# Patient Record
Sex: MALE | Race: WHITE | NOT HISPANIC OR LATINO | Employment: OTHER | URBAN - METROPOLITAN AREA
[De-identification: names, ages, dates, MRNs, and addresses within clinical notes are randomized per-mention and may not be internally consistent; named-entity substitution may affect disease eponyms.]

---

## 2018-03-29 ENCOUNTER — TRANSCRIBE ORDERS (OUTPATIENT)
Dept: ADMINISTRATIVE | Facility: HOSPITAL | Age: 68
End: 2018-03-29

## 2018-03-29 DIAGNOSIS — I20.0 UNSTABLE ANGINA (HCC): Primary | ICD-10-CM

## 2018-03-30 ENCOUNTER — APPOINTMENT (OUTPATIENT)
Dept: CARDIOLOGY | Facility: HOSPITAL | Age: 68
End: 2018-03-30

## 2018-03-30 ENCOUNTER — HOSPITAL ENCOUNTER (OUTPATIENT)
Facility: HOSPITAL | Age: 68
Setting detail: HOSPITAL OUTPATIENT SURGERY
Discharge: HOME | End: 2018-03-30
Attending: INTERNAL MEDICINE | Admitting: INTERNAL MEDICINE

## 2018-03-30 VITALS
TEMPERATURE: 97.8 F | WEIGHT: 218 LBS | RESPIRATION RATE: 16 BRPM | BODY MASS INDEX: 29.53 KG/M2 | HEIGHT: 72 IN | SYSTOLIC BLOOD PRESSURE: 136 MMHG | HEART RATE: 44 BPM | DIASTOLIC BLOOD PRESSURE: 81 MMHG | OXYGEN SATURATION: 99 %

## 2018-03-30 DIAGNOSIS — I20.0 UNSTABLE ANGINA (HCC): ICD-10-CM

## 2018-03-30 LAB
ANION GAP SERPL CALCULATED.3IONS-SCNC: 10 MMOL/L (ref 3–11)
BUN BLD-MCNC: 19 MG/DL (ref 9–23)
BUN/CREAT SERPL: 15.8 (ref 7–25)
CALCIUM SPEC-SCNC: 9.4 MG/DL (ref 8.7–10.4)
CHLORIDE SERPL-SCNC: 98 MMOL/L (ref 99–109)
CO2 SERPL-SCNC: 25 MMOL/L (ref 20–31)
CREAT BLD-MCNC: 1.2 MG/DL (ref 0.6–1.3)
DEPRECATED RDW RBC AUTO: 42.8 FL (ref 37–54)
ERYTHROCYTE [DISTWIDTH] IN BLOOD BY AUTOMATED COUNT: 12.7 % (ref 11.3–14.5)
GFR SERPL CREATININE-BSD FRML MDRD: 60 ML/MIN/1.73
GLUCOSE BLD-MCNC: 107 MG/DL (ref 70–100)
HCT VFR BLD AUTO: 39.9 % (ref 38.9–50.9)
HGB BLD-MCNC: 13.7 G/DL (ref 13.1–17.5)
MCH RBC QN AUTO: 31.4 PG (ref 27–31)
MCHC RBC AUTO-ENTMCNC: 34.3 G/DL (ref 32–36)
MCV RBC AUTO: 91.3 FL (ref 80–99)
PLATELET # BLD AUTO: 184 10*3/MM3 (ref 150–450)
PMV BLD AUTO: 9.8 FL (ref 6–12)
POTASSIUM BLD-SCNC: 4.1 MMOL/L (ref 3.5–5.5)
RBC # BLD AUTO: 4.37 10*6/MM3 (ref 4.2–5.76)
SODIUM BLD-SCNC: 133 MMOL/L (ref 132–146)
WBC NRBC COR # BLD: 6.54 10*3/MM3 (ref 3.5–10.8)

## 2018-03-30 PROCEDURE — 80047 BASIC METABLC PNL IONIZED CA: CPT

## 2018-03-30 PROCEDURE — 0 IOPAMIDOL PER 1 ML: Performed by: INTERNAL MEDICINE

## 2018-03-30 PROCEDURE — 25010000002 FENTANYL CITRATE (PF) 100 MCG/2ML SOLUTION: Performed by: INTERNAL MEDICINE

## 2018-03-30 PROCEDURE — 85014 HEMATOCRIT: CPT

## 2018-03-30 PROCEDURE — 25010000002 HEPARIN (PORCINE) PER 1000 UNITS: Performed by: INTERNAL MEDICINE

## 2018-03-30 PROCEDURE — 85027 COMPLETE CBC AUTOMATED: CPT | Performed by: INTERNAL MEDICINE

## 2018-03-30 PROCEDURE — C1769 GUIDE WIRE: HCPCS | Performed by: INTERNAL MEDICINE

## 2018-03-30 PROCEDURE — 93458 L HRT ARTERY/VENTRICLE ANGIO: CPT | Performed by: INTERNAL MEDICINE

## 2018-03-30 PROCEDURE — 93306 TTE W/DOPPLER COMPLETE: CPT

## 2018-03-30 PROCEDURE — 25010000002 MIDAZOLAM PER 1 MG: Performed by: INTERNAL MEDICINE

## 2018-03-30 PROCEDURE — C1894 INTRO/SHEATH, NON-LASER: HCPCS | Performed by: INTERNAL MEDICINE

## 2018-03-30 PROCEDURE — 80048 BASIC METABOLIC PNL TOTAL CA: CPT | Performed by: INTERNAL MEDICINE

## 2018-03-30 PROCEDURE — 93880 EXTRACRANIAL BILAT STUDY: CPT

## 2018-03-30 PROCEDURE — 36415 COLL VENOUS BLD VENIPUNCTURE: CPT

## 2018-03-30 RX ORDER — ALPRAZOLAM 0.25 MG/1
0.25 TABLET ORAL 3 TIMES DAILY PRN
Status: DISCONTINUED | OUTPATIENT
Start: 2018-03-30 | End: 2018-03-30 | Stop reason: HOSPADM

## 2018-03-30 RX ORDER — LIDOCAINE HYDROCHLORIDE 10 MG/ML
INJECTION, SOLUTION EPIDURAL; INFILTRATION; INTRACAUDAL; PERINEURAL AS NEEDED
Status: DISCONTINUED | OUTPATIENT
Start: 2018-03-30 | End: 2018-03-30 | Stop reason: HOSPADM

## 2018-03-30 RX ORDER — NALOXONE HCL 0.4 MG/ML
0.4 VIAL (ML) INJECTION
Status: DISCONTINUED | OUTPATIENT
Start: 2018-03-30 | End: 2018-03-30 | Stop reason: HOSPADM

## 2018-03-30 RX ORDER — SODIUM CHLORIDE 1000 MG
1 TABLET, SOLUBLE MISCELLANEOUS 2 TIMES DAILY
COMMUNITY
End: 2018-11-30

## 2018-03-30 RX ORDER — LORAZEPAM 0.5 MG
1 TABLET ORAL 3 TIMES DAILY
COMMUNITY
End: 2018-11-30

## 2018-03-30 RX ORDER — ASPIRIN 81 MG/1
81 TABLET, CHEWABLE ORAL DAILY
COMMUNITY

## 2018-03-30 RX ORDER — NEBIVOLOL 2.5 MG/1
2.5 TABLET ORAL DAILY
COMMUNITY
End: 2018-04-12 | Stop reason: HOSPADM

## 2018-03-30 RX ORDER — FEXOFENADINE HCL 180 MG/1
180 TABLET ORAL 2 TIMES DAILY
COMMUNITY
End: 2018-11-30

## 2018-03-30 RX ORDER — VALSARTAN 160 MG/1
320 TABLET ORAL NIGHTLY
COMMUNITY
End: 2018-04-12 | Stop reason: HOSPADM

## 2018-03-30 RX ORDER — ACETAMINOPHEN 325 MG/1
650 TABLET ORAL EVERY 4 HOURS PRN
Status: DISCONTINUED | OUTPATIENT
Start: 2018-03-30 | End: 2018-03-30 | Stop reason: HOSPADM

## 2018-03-30 RX ORDER — RANITIDINE 150 MG/1
300 TABLET ORAL 2 TIMES DAILY
COMMUNITY
End: 2022-07-26

## 2018-03-30 RX ORDER — SODIUM CHLORIDE 9 MG/ML
250 INJECTION, SOLUTION INTRAVENOUS CONTINUOUS
Status: ACTIVE | OUTPATIENT
Start: 2018-03-30 | End: 2018-03-30

## 2018-03-30 RX ORDER — MIDAZOLAM HYDROCHLORIDE 1 MG/ML
INJECTION INTRAMUSCULAR; INTRAVENOUS AS NEEDED
Status: DISCONTINUED | OUTPATIENT
Start: 2018-03-30 | End: 2018-03-30 | Stop reason: HOSPADM

## 2018-03-30 RX ORDER — LEVOTHYROXINE SODIUM 0.05 MG/1
50 TABLET ORAL DAILY
COMMUNITY
End: 2022-06-24

## 2018-03-30 RX ORDER — TEMAZEPAM 7.5 MG/1
7.5 CAPSULE ORAL NIGHTLY PRN
Status: DISCONTINUED | OUTPATIENT
Start: 2018-03-30 | End: 2018-03-30 | Stop reason: HOSPADM

## 2018-03-30 RX ORDER — ASPIRIN 325 MG
325 TABLET, DELAYED RELEASE (ENTERIC COATED) ORAL DAILY
Status: DISCONTINUED | OUTPATIENT
Start: 2018-03-30 | End: 2018-03-30 | Stop reason: HOSPADM

## 2018-03-30 RX ORDER — MORPHINE SULFATE 2 MG/ML
1 INJECTION, SOLUTION INTRAMUSCULAR; INTRAVENOUS EVERY 4 HOURS PRN
Status: DISCONTINUED | OUTPATIENT
Start: 2018-03-30 | End: 2018-03-30 | Stop reason: HOSPADM

## 2018-03-30 RX ORDER — MONTELUKAST SODIUM 10 MG/1
10 TABLET ORAL NIGHTLY
COMMUNITY
End: 2022-08-23

## 2018-03-30 RX ORDER — FENTANYL CITRATE 50 UG/ML
INJECTION, SOLUTION INTRAMUSCULAR; INTRAVENOUS AS NEEDED
Status: DISCONTINUED | OUTPATIENT
Start: 2018-03-30 | End: 2018-03-30 | Stop reason: HOSPADM

## 2018-03-30 RX ORDER — FLUTICASONE PROPIONATE 50 MCG
1 SPRAY, SUSPENSION (ML) NASAL DAILY
COMMUNITY
End: 2022-09-19

## 2018-03-30 RX ORDER — CLOPIDOGREL BISULFATE 75 MG/1
75 TABLET ORAL DAILY
COMMUNITY
End: 2018-03-30 | Stop reason: HOSPADM

## 2018-03-30 RX ORDER — HYDROCODONE BITARTRATE AND ACETAMINOPHEN 5; 325 MG/1; MG/1
1 TABLET ORAL EVERY 4 HOURS PRN
Status: DISCONTINUED | OUTPATIENT
Start: 2018-03-30 | End: 2018-03-30 | Stop reason: HOSPADM

## 2018-03-30 RX ORDER — LUBIPROSTONE 24 UG/1
24 CAPSULE ORAL 2 TIMES DAILY WITH MEALS
COMMUNITY
End: 2022-05-18

## 2018-03-30 RX ADMIN — ASPIRIN 325 MG: 325 TABLET, DELAYED RELEASE ORAL at 07:28

## 2018-04-02 ENCOUNTER — HOSPITAL ENCOUNTER (OUTPATIENT)
Dept: PULMONOLOGY | Facility: HOSPITAL | Age: 68
Discharge: HOME | End: 2018-04-02

## 2018-04-02 ENCOUNTER — HOSPITAL ENCOUNTER (OUTPATIENT)
Dept: GENERAL RADIOLOGY | Facility: HOSPITAL | Age: 68
Discharge: HOME | End: 2018-04-02
Admitting: PHYSICIAN ASSISTANT

## 2018-04-02 ENCOUNTER — OFFICE VISIT (OUTPATIENT)
Dept: CARDIAC SURGERY | Facility: CLINIC | Age: 68
End: 2018-04-02

## 2018-04-02 ENCOUNTER — APPOINTMENT (OUTPATIENT)
Dept: PREADMISSION TESTING | Facility: HOSPITAL | Age: 68
End: 2018-04-02

## 2018-04-02 ENCOUNTER — PREP FOR SURGERY (OUTPATIENT)
Dept: OTHER | Facility: HOSPITAL | Age: 68
End: 2018-04-02

## 2018-04-02 VITALS
HEART RATE: 51 BPM | TEMPERATURE: 97.6 F | HEIGHT: 72 IN | DIASTOLIC BLOOD PRESSURE: 70 MMHG | OXYGEN SATURATION: 98 % | BODY MASS INDEX: 30.07 KG/M2 | WEIGHT: 222 LBS | SYSTOLIC BLOOD PRESSURE: 160 MMHG

## 2018-04-02 VITALS — BODY MASS INDEX: 29.98 KG/M2 | WEIGHT: 221.34 LBS | HEIGHT: 72 IN

## 2018-04-02 DIAGNOSIS — I20.8 ANGINA AT REST (HCC): Primary | ICD-10-CM

## 2018-04-02 DIAGNOSIS — I73.9 PERIPHERAL ARTERIAL DISEASE (HCC): ICD-10-CM

## 2018-04-02 DIAGNOSIS — I25.118 CORONARY ARTERY DISEASE OF NATIVE ARTERY OF NATIVE HEART WITH STABLE ANGINA PECTORIS (HCC): ICD-10-CM

## 2018-04-02 DIAGNOSIS — I25.118 CORONARY ARTERY DISEASE OF NATIVE ARTERY OF NATIVE HEART WITH STABLE ANGINA PECTORIS (HCC): Primary | ICD-10-CM

## 2018-04-02 DIAGNOSIS — I20.0 UNSTABLE ANGINA (HCC): ICD-10-CM

## 2018-04-02 DIAGNOSIS — I25.119 CORONARY ARTERY DISEASE INVOLVING NATIVE CORONARY ARTERY OF NATIVE HEART WITH ANGINA PECTORIS (HCC): Primary | ICD-10-CM

## 2018-04-02 DIAGNOSIS — I73.9 PAD (PERIPHERAL ARTERY DISEASE) (HCC): Primary | ICD-10-CM

## 2018-04-02 PROBLEM — I20.89 ANGINA AT REST: Status: ACTIVE | Noted: 2018-04-02

## 2018-04-02 LAB
ABO GROUP BLD: NORMAL
ALBUMIN SERPL-MCNC: 4.4 G/DL (ref 3.2–4.8)
ALBUMIN/GLOB SERPL: 1.5 G/DL (ref 1.5–2.5)
ALP SERPL-CCNC: 75 U/L (ref 25–100)
ALT SERPL W P-5'-P-CCNC: 34 U/L (ref 7–40)
AMPHET+METHAMPHET UR QL: NEGATIVE
AMPHETAMINES UR QL: NEGATIVE
ANION GAP SERPL CALCULATED.3IONS-SCNC: 11 MMOL/L (ref 3–11)
APTT PPP: 33.2 SECONDS (ref 24–31)
AST SERPL-CCNC: 26 U/L (ref 0–33)
BARBITURATES UR QL SCN: NEGATIVE
BASOPHILS # BLD AUTO: 0.06 10*3/MM3 (ref 0–0.2)
BASOPHILS NFR BLD AUTO: 0.9 % (ref 0–1)
BENZODIAZ UR QL SCN: NEGATIVE
BH CV ECHO MEAS - AO MAX PG (FULL): 1.5 MMHG
BH CV ECHO MEAS - AO MAX PG: 7 MMHG
BH CV ECHO MEAS - AO ROOT AREA (BSA CORRECTED): 1.8
BH CV ECHO MEAS - AO ROOT AREA: 12 CM^2
BH CV ECHO MEAS - AO ROOT DIAM: 3.9 CM
BH CV ECHO MEAS - AO V2 MAX: 126.9 CM/SEC
BH CV ECHO MEAS - AVA(V,A): 2.4 CM^2
BH CV ECHO MEAS - AVA(V,D): 2.4 CM^2
BH CV ECHO MEAS - BSA(HAYCOCK): 2.3 M^2
BH CV ECHO MEAS - BSA(HAYCOCK): 2.3 M^2
BH CV ECHO MEAS - BSA: 2.2 M^2
BH CV ECHO MEAS - BSA: 2.2 M^2
BH CV ECHO MEAS - BZI_BMI: 29.6 KILOGRAMS/M^2
BH CV ECHO MEAS - BZI_BMI: 29.6 KILOGRAMS/M^2
BH CV ECHO MEAS - BZI_METRIC_HEIGHT: 182.9 CM
BH CV ECHO MEAS - BZI_METRIC_HEIGHT: 182.9 CM
BH CV ECHO MEAS - BZI_METRIC_WEIGHT: 98.9 KG
BH CV ECHO MEAS - BZI_METRIC_WEIGHT: 98.9 KG
BH CV ECHO MEAS - CONTRAST EF (2CH): 71.4 ML/M^2
BH CV ECHO MEAS - CONTRAST EF 4CH: 72.1 ML/M^2
BH CV ECHO MEAS - EDV(CUBED): 96.8 ML
BH CV ECHO MEAS - EDV(MOD-SP2): 84 ML
BH CV ECHO MEAS - EDV(MOD-SP4): 104 ML
BH CV ECHO MEAS - EDV(TEICH): 96.9 ML
BH CV ECHO MEAS - EF(CUBED): 76.9 %
BH CV ECHO MEAS - EF(MOD-SP2): 71.4 %
BH CV ECHO MEAS - EF(MOD-SP4): 72.1 %
BH CV ECHO MEAS - EF(TEICH): 69 %
BH CV ECHO MEAS - ESV(CUBED): 22.4 ML
BH CV ECHO MEAS - ESV(MOD-SP2): 24 ML
BH CV ECHO MEAS - ESV(MOD-SP4): 29 ML
BH CV ECHO MEAS - ESV(TEICH): 30 ML
BH CV ECHO MEAS - FS: 38.6 %
BH CV ECHO MEAS - IVS/LVPW: 0.99
BH CV ECHO MEAS - IVSD: 1 CM
BH CV ECHO MEAS - LA DIMENSION: 2.9 CM
BH CV ECHO MEAS - LA/AO: 0.73
BH CV ECHO MEAS - LAT PEAK E' VEL: 6.4 CM/SEC
BH CV ECHO MEAS - LV DIASTOLIC VOL/BSA (35-75): 47.1 ML/M^2
BH CV ECHO MEAS - LV MASS(C)D: 164.4 GRAMS
BH CV ECHO MEAS - LV MASS(C)DI: 74.4 GRAMS/M^2
BH CV ECHO MEAS - LV MAX PG: 5.5 MMHG
BH CV ECHO MEAS - LV SYSTOLIC VOL/BSA (12-30): 13.1 ML/M^2
BH CV ECHO MEAS - LV V1 MAX: 117.3 CM/SEC
BH CV ECHO MEAS - LVIDD: 4.6 CM
BH CV ECHO MEAS - LVIDS: 2.8 CM
BH CV ECHO MEAS - LVLD AP2: 8.5 CM
BH CV ECHO MEAS - LVLD AP4: 9.3 CM
BH CV ECHO MEAS - LVLS AP2: 7.3 CM
BH CV ECHO MEAS - LVLS AP4: 7.6 CM
BH CV ECHO MEAS - LVOT AREA (M): 2.5 CM^2
BH CV ECHO MEAS - LVOT AREA: 2.6 CM^2
BH CV ECHO MEAS - LVOT DIAM: 1.8 CM
BH CV ECHO MEAS - LVPWD: 1 CM
BH CV ECHO MEAS - MED PEAK E' VEL: 7.74 CM/SEC
BH CV ECHO MEAS - MV A MAX VEL: 71.3 CM/SEC
BH CV ECHO MEAS - MV DEC TIME: 0.27 SEC
BH CV ECHO MEAS - MV E MAX VEL: 91.5 CM/SEC
BH CV ECHO MEAS - MV E/A: 1.3
BH CV ECHO MEAS - PA ACC SLOPE: 718.3 CM/SEC^2
BH CV ECHO MEAS - PA ACC TIME: 0.08 SEC
BH CV ECHO MEAS - PA PR(ACCEL): 43.3 MMHG
BH CV ECHO MEAS - RVDD: 3.7 CM
BH CV ECHO MEAS - SI(CUBED): 33.7 ML/M^2
BH CV ECHO MEAS - SI(MOD-SP2): 27.2 ML/M^2
BH CV ECHO MEAS - SI(MOD-SP4): 33.9 ML/M^2
BH CV ECHO MEAS - SI(TEICH): 30.3 ML/M^2
BH CV ECHO MEAS - SV(CUBED): 74.4 ML
BH CV ECHO MEAS - SV(MOD-SP2): 60 ML
BH CV ECHO MEAS - SV(MOD-SP4): 75 ML
BH CV ECHO MEAS - SV(TEICH): 66.9 ML
BH CV ECHO MEAS - TAPSE (>1.6): 2 CM2
BH CV ECHO MEAS - TR MAX VEL: 189.8 CM/SEC
BH CV VAS BP RIGHT ARM: NORMAL MMHG
BH CV XLRA - RV BASE: 4 CM
BH CV XLRA - RV LENGTH: 8.1 CM
BH CV XLRA - RV MID: 4.2 CM
BH CV XLRA - TDI S': 12.2 CM/SEC
BH CV XLRA MEAS LEFT CCA RATIO VEL: 83.1 CM/SEC
BH CV XLRA MEAS LEFT DIST CCA EDV: 21 CM/SEC
BH CV XLRA MEAS LEFT DIST CCA PSV: 68.4 CM/SEC
BH CV XLRA MEAS LEFT DIST ICA EDV: 23.2 CM/SEC
BH CV XLRA MEAS LEFT DIST ICA PSV: 60.7 CM/SEC
BH CV XLRA MEAS LEFT ICA RATIO VEL: 78.9 CM/SEC
BH CV XLRA MEAS LEFT ICA/CCA RATIO: 0.95
BH CV XLRA MEAS LEFT MID CCA EDV: 20.3 CM/SEC
BH CV XLRA MEAS LEFT MID CCA PSV: 83.8 CM/SEC
BH CV XLRA MEAS LEFT MID ICA EDV: 21.6 CM/SEC
BH CV XLRA MEAS LEFT MID ICA PSV: 79.6 CM/SEC
BH CV XLRA MEAS LEFT PROX CCA EDV: 18.9 CM/SEC
BH CV XLRA MEAS LEFT PROX CCA PSV: 82.4 CM/SEC
BH CV XLRA MEAS LEFT PROX ECA PSV: 111.7 CM/SEC
BH CV XLRA MEAS LEFT PROX ICA EDV: 27.2 CM/SEC
BH CV XLRA MEAS LEFT PROX ICA PSV: 88.7 CM/SEC
BH CV XLRA MEAS LEFT PROX SCLA PSV: 163 CM/SEC
BH CV XLRA MEAS LEFT VERTEBRAL A EDV: 9.8 CM/SEC
BH CV XLRA MEAS LEFT VERTEBRAL A PSV: 40.1 CM/SEC
BH CV XLRA MEAS RIGHT CCA RATIO VEL: 70.7 CM/SEC
BH CV XLRA MEAS RIGHT DIST CCA EDV: 16.7 CM/SEC
BH CV XLRA MEAS RIGHT DIST CCA PSV: 72.2 CM/SEC
BH CV XLRA MEAS RIGHT DIST ICA EDV: 27 CM/SEC
BH CV XLRA MEAS RIGHT DIST ICA PSV: 76.1 CM/SEC
BH CV XLRA MEAS RIGHT ICA RATIO VEL: 79.6 CM/SEC
BH CV XLRA MEAS RIGHT ICA/CCA RATIO: 1.1
BH CV XLRA MEAS RIGHT MID CCA EDV: 17.7 CM/SEC
BH CV XLRA MEAS RIGHT MID CCA PSV: 71.2 CM/SEC
BH CV XLRA MEAS RIGHT MID ICA EDV: 29 CM/SEC
BH CV XLRA MEAS RIGHT MID ICA PSV: 80 CM/SEC
BH CV XLRA MEAS RIGHT PROX CCA EDV: 18.9 CM/SEC
BH CV XLRA MEAS RIGHT PROX CCA PSV: 86.7 CM/SEC
BH CV XLRA MEAS RIGHT PROX ECA PSV: 96.3 CM/SEC
BH CV XLRA MEAS RIGHT PROX ICA EDV: 25.5 CM/SEC
BH CV XLRA MEAS RIGHT PROX ICA PSV: 64.8 CM/SEC
BH CV XLRA MEAS RIGHT PROX SCLA PSV: 121.9 CM/SEC
BH CV XLRA MEAS RIGHT VERTEBRAL A EDV: 16.7 CM/SEC
BH CV XLRA MEAS RIGHT VERTEBRAL A PSV: 45.7 CM/SEC
BILIRUB SERPL-MCNC: 0.6 MG/DL (ref 0.3–1.2)
BUN BLD-MCNC: 22 MG/DL (ref 9–23)
BUN/CREAT SERPL: 18.3 (ref 7–25)
BUPRENORPHINE SERPL-MCNC: NEGATIVE NG/ML
CALCIUM SPEC-SCNC: 9.8 MG/DL (ref 8.7–10.4)
CANNABINOIDS SERPL QL: NEGATIVE
CHLORIDE SERPL-SCNC: 94 MMOL/L (ref 99–109)
CO2 SERPL-SCNC: 26 MMOL/L (ref 20–31)
COCAINE UR QL: NEGATIVE
CREAT BLD-MCNC: 1.2 MG/DL (ref 0.6–1.3)
DEPRECATED RDW RBC AUTO: 42.6 FL (ref 37–54)
E/E' RATIO: 13.1
EOSINOPHIL # BLD AUTO: 0.2 10*3/MM3 (ref 0–0.3)
EOSINOPHIL NFR BLD AUTO: 3.1 % (ref 0–3)
ERYTHROCYTE [DISTWIDTH] IN BLOOD BY AUTOMATED COUNT: 12.6 % (ref 11.3–14.5)
GFR SERPL CREATININE-BSD FRML MDRD: 60 ML/MIN/1.73
GLOBULIN UR ELPH-MCNC: 3 GM/DL
GLUCOSE BLD-MCNC: 113 MG/DL (ref 70–100)
HBA1C MFR BLD: 5.8 % (ref 4.8–5.6)
HCT VFR BLD AUTO: 39.4 % (ref 38.9–50.9)
HGB BLD-MCNC: 13.6 G/DL (ref 13.1–17.5)
IMM GRANULOCYTES # BLD: 0.02 10*3/MM3 (ref 0–0.03)
IMM GRANULOCYTES NFR BLD: 0.3 % (ref 0–0.6)
INR PPP: 1.04 (ref 0.91–1.09)
LEFT ATRIUM VOLUME INDEX: 24 ML/M2
LYMPHOCYTES # BLD AUTO: 1.11 10*3/MM3 (ref 0.6–4.8)
LYMPHOCYTES NFR BLD AUTO: 17.4 % (ref 24–44)
MAGNESIUM SERPL-MCNC: 1.8 MG/DL (ref 1.3–2.7)
MAXIMAL PREDICTED HEART RATE: 152 BPM
MCH RBC QN AUTO: 31.6 PG (ref 27–31)
MCHC RBC AUTO-ENTMCNC: 34.5 G/DL (ref 32–36)
MCV RBC AUTO: 91.4 FL (ref 80–99)
METHADONE UR QL SCN: NEGATIVE
MONOCYTES # BLD AUTO: 0.58 10*3/MM3 (ref 0–1)
MONOCYTES NFR BLD AUTO: 9.1 % (ref 0–12)
NEUTROPHILS # BLD AUTO: 4.42 10*3/MM3 (ref 1.5–8.3)
NEUTROPHILS NFR BLD AUTO: 69.2 % (ref 41–71)
OPIATES UR QL: NEGATIVE
OXYCODONE UR QL SCN: NEGATIVE
PA ADP PRP-ACNC: 184 PRU
PCP UR QL SCN: NEGATIVE
PLATELET # BLD AUTO: 207 10*3/MM3 (ref 150–450)
PMV BLD AUTO: 9.9 FL (ref 6–12)
POTASSIUM BLD-SCNC: 4.5 MMOL/L (ref 3.5–5.5)
PROPOXYPH UR QL: NEGATIVE
PROT SERPL-MCNC: 7.4 G/DL (ref 5.7–8.2)
PROTHROMBIN TIME: 10.9 SECONDS (ref 9.6–11.5)
RBC # BLD AUTO: 4.31 10*6/MM3 (ref 4.2–5.76)
RH BLD: POSITIVE
RIGHT ARM BP: NORMAL MMHG
SODIUM BLD-SCNC: 131 MMOL/L (ref 132–146)
STRESS TARGET HR: 129 BPM
TRICYCLICS UR QL SCN: NEGATIVE
WBC NRBC COR # BLD: 6.39 10*3/MM3 (ref 3.5–10.8)

## 2018-04-02 PROCEDURE — 85576 BLOOD PLATELET AGGREGATION: CPT | Performed by: PHYSICIAN ASSISTANT

## 2018-04-02 PROCEDURE — 85730 THROMBOPLASTIN TIME PARTIAL: CPT | Performed by: PHYSICIAN ASSISTANT

## 2018-04-02 PROCEDURE — 83036 HEMOGLOBIN GLYCOSYLATED A1C: CPT | Performed by: PHYSICIAN ASSISTANT

## 2018-04-02 PROCEDURE — 94010 BREATHING CAPACITY TEST: CPT

## 2018-04-02 PROCEDURE — 93005 ELECTROCARDIOGRAM TRACING: CPT

## 2018-04-02 PROCEDURE — 93010 ELECTROCARDIOGRAM REPORT: CPT | Performed by: INTERNAL MEDICINE

## 2018-04-02 PROCEDURE — 99205 OFFICE O/P NEW HI 60 MIN: CPT | Performed by: THORACIC SURGERY (CARDIOTHORACIC VASCULAR SURGERY)

## 2018-04-02 PROCEDURE — 85025 COMPLETE CBC W/AUTO DIFF WBC: CPT | Performed by: PHYSICIAN ASSISTANT

## 2018-04-02 PROCEDURE — 85610 PROTHROMBIN TIME: CPT | Performed by: PHYSICIAN ASSISTANT

## 2018-04-02 PROCEDURE — 86901 BLOOD TYPING SEROLOGIC RH(D): CPT

## 2018-04-02 PROCEDURE — 86900 BLOOD TYPING SEROLOGIC ABO: CPT

## 2018-04-02 PROCEDURE — 83735 ASSAY OF MAGNESIUM: CPT | Performed by: PHYSICIAN ASSISTANT

## 2018-04-02 PROCEDURE — 80306 DRUG TEST PRSMV INSTRMNT: CPT | Performed by: PHYSICIAN ASSISTANT

## 2018-04-02 PROCEDURE — 71046 X-RAY EXAM CHEST 2 VIEWS: CPT

## 2018-04-02 PROCEDURE — 36415 COLL VENOUS BLD VENIPUNCTURE: CPT

## 2018-04-02 PROCEDURE — 80053 COMPREHEN METABOLIC PANEL: CPT | Performed by: PHYSICIAN ASSISTANT

## 2018-04-02 RX ORDER — CHLORHEXIDINE GLUCONATE 500 MG/1
1 CLOTH TOPICAL EVERY 12 HOURS PRN
Status: DISCONTINUED | OUTPATIENT
Start: 2018-04-02 | End: 2018-04-26

## 2018-04-02 RX ORDER — CEFAZOLIN SODIUM 2 G/100ML
2 INJECTION, SOLUTION INTRAVENOUS ONCE
Status: CANCELLED | OUTPATIENT
Start: 2018-04-05 | End: 2018-04-05

## 2018-04-02 RX ORDER — CLOPIDOGREL BISULFATE 75 MG/1
75 TABLET ORAL DAILY
COMMUNITY
End: 2022-04-26

## 2018-04-02 RX ORDER — DOCUSATE SODIUM 100 MG/1
300 CAPSULE, LIQUID FILLED ORAL DAILY
COMMUNITY
End: 2018-11-30

## 2018-04-02 RX ORDER — CHLORHEXIDINE GLUCONATE 500 MG/1
1 CLOTH TOPICAL EVERY 12 HOURS PRN
Status: CANCELLED | OUTPATIENT
Start: 2018-04-05

## 2018-04-02 RX ORDER — ACETAMINOPHEN 325 MG/1
650 TABLET ORAL EVERY 4 HOURS PRN
Status: CANCELLED | OUTPATIENT
Start: 2018-04-05

## 2018-04-02 RX ORDER — ASPIRIN 325 MG
325 TABLET ORAL NIGHTLY
Status: SHIPPED | OUTPATIENT
Start: 2018-04-02 | End: 2018-04-03

## 2018-04-02 RX ORDER — NITROGLYCERIN 0.4 MG/1
0.4 TABLET SUBLINGUAL
Status: CANCELLED | OUTPATIENT
Start: 2018-04-05

## 2018-04-02 RX ORDER — CHLORHEXIDINE GLUCONATE 500 MG/1
1 CLOTH TOPICAL EVERY 12 HOURS PRN
Status: CANCELLED | OUTPATIENT
Start: 2018-04-02

## 2018-04-02 RX ORDER — CHLORHEXIDINE GLUCONATE 0.12 MG/ML
15 RINSE ORAL ONCE
Status: CANCELLED | OUTPATIENT
Start: 2018-04-05 | End: 2018-04-05

## 2018-04-02 RX ORDER — ASPIRIN 325 MG
325 TABLET ORAL NIGHTLY
Status: CANCELLED | OUTPATIENT
Start: 2018-04-02 | End: 2018-04-03

## 2018-04-02 NOTE — PROGRESS NOTES
04/02/2018  Patient Information  Kavon Waters Jr.                                                                                          [address] [facility] [address]   1950  'PCP/Referring Physician'  Mina Story MD  [phone]  No ref. provider found    Chief Complaint   Patient presents with   • Consult     NP Per Dr. Ahn for CAD       History of Present Illness:  The patient was referred to me to evaluate for coronary bypass grafting surgery.  He has had multiple previous coronary artery stents in the past and has had some cardiac arrhythmias.  He has also had some recent shortness of breath with minimal activity and some very mild substernal pain radiating to the base of the neck.  At first he thought he may be just having indigestion and not cardiac pain.  He was referred to Dr. Ahn for evaluation and catheterization demonstrated coronary disease with significant in-stent stenosis.  The patient was on Plavix.  He has stopped his Plavix at this time and is seeing me in the office to discuss surgery.  He is pain-free in the office at this time and breathing unlabored. He states that he wakes at night 3 times or 4 times with cramping in his calf muscles and has attributed this to his low sodium levels.  However, I'm suspicious that there may be some underlying vascular disease in the superficial femoral arteries.      Patient Active Problem List   Diagnosis   • Unstable angina   • Angina at rest   • Coronary artery disease involving native coronary artery of native heart with angina pectoris   • Peripheral arterial disease     Past Medical History:   Diagnosis Date   • Arrhythmia    • Arthritis    • Borderline diabetes    • Cancer     PROSTATE   • Coronary artery disease    • Disease of thyroid gland    • GERD (gastroesophageal reflux disease)    • Hyperlipidemia    • Hypertension    • Low sodium levels    • Skin cancer    • Stroke      Past Surgical History:   Procedure  Laterality Date   • CARDIAC CATHETERIZATION     • CHOLECYSTECTOMY     • CORONARY STENT PLACEMENT     • SKIN CANCER EXCISION         Current Outpatient Prescriptions:   •  aspirin 81 MG chewable tablet, Chew 81 mg Daily., Disp: , Rfl:   •  clopidogrel (PLAVIX) 75 MG tablet, Take 75 mg by mouth Daily., Disp: , Rfl:   •  fluticasone (FLONASE) 50 MCG/ACT nasal spray, 1 spray into each nostril Daily., Disp: , Rfl:   •  levothyroxine (SYNTHROID, LEVOTHROID) 50 MCG tablet, Take 55 mcg by mouth Daily., Disp: , Rfl:   •  lubiprostone (AMITIZA) 24 MCG capsule, Take 24 mcg by mouth Daily With Breakfast., Disp: , Rfl:   •  montelukast (SINGULAIR) 10 MG tablet, Take 10 mg by mouth Every Night., Disp: , Rfl:   •  nebivolol (BYSTOLIC) 2.5 MG tablet, Take 2.5 mg by mouth Daily., Disp: , Rfl:   •  Omega-3-6-9 capsule, Take 1 tablet by mouth 2 (Two) Times a Day., Disp: , Rfl:   •  raNITIdine (ZANTAC) 150 MG tablet, Take 300 mg by mouth Every Night., Disp: , Rfl:   •  sodium chloride 1 g tablet, Take 1 g by mouth 2 (Two) Times a Day., Disp: , Rfl:   •  valsartan (DIOVAN) 160 MG tablet, Take 320 mg by mouth Every Night., Disp: , Rfl:   •  fexofenadine (ALLEGRA) 180 MG tablet, Take 180 mg by mouth 2 (Two) Times a Day., Disp: , Rfl:   No Known Allergies  Social History     Social History   • Marital status:      Spouse name: N/A   • Number of children: 2   • Years of education: N/A     Occupational History   • 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     Social History Main Topics   • Smoking status: Former Smoker     Packs/day: 3.00     Years: 15.00     Types: Cigarettes     Quit date: 4/2/1988   • Smokeless tobacco: Never Used      Comment: QUIT 30 YEARS AGO   • Alcohol use No   • Drug use: No   • Sexual activity: Defer     Other Topics Concern   • Not on file     Social History Narrative   • No narrative on file     Family History   Problem Relation Age of Onset   • Kidney failure Mother    • Hypertension Mother    •  "Coronary artery disease Mother    • Coronary artery disease Father    • Cancer Father      Review of Systems   Constitution: Positive for malaise/fatigue. Negative for chills, fever, night sweats and weight loss.   HENT: Negative for hearing loss, odynophagia and sore throat.    Cardiovascular: Positive for chest pain, claudication and dyspnea on exertion. Negative for leg swelling, orthopnea and palpitations.   Respiratory: Positive for cough and wheezing. Negative for hemoptysis.    Endocrine: Negative for cold intolerance, heat intolerance, polydipsia, polyphagia and polyuria.   Hematologic/Lymphatic: Bruises/bleeds easily.   Skin: Positive for rash (facial area and groin area). Negative for itching.   Musculoskeletal: Positive for arthritis and back pain. Negative for joint pain, joint swelling and myalgias.   Gastrointestinal: Positive for constipation. Negative for abdominal pain, diarrhea, hematemesis, hematochezia, melena, nausea and vomiting.   Genitourinary: Negative for dysuria, frequency and hematuria.   Neurological: Positive for light-headedness. Negative for focal weakness, headaches, numbness and seizures.   Psychiatric/Behavioral: Negative for suicidal ideas.   All other systems reviewed and are negative.    Vitals:    04/02/18 0742   BP: 160/70   BP Location: Right arm   Patient Position: Sitting   Pulse: 51   Temp: 97.6 °F (36.4 °C)   SpO2: 98%   Weight: 101 kg (222 lb)   Height: 182.9 cm (72\")      Physical Exam   CONSTITUTIONAL: Alert and conversant, Well dressed, Well nourished, No acute distress  EYES: Sclera clean, Anicteric, Pupils equal  ENT: No nasal deviation, Trachea midline  NECK: No neck masses, Supple  LUNGS: No wheezing, Cough, non-congested  HEART: No rubs, No murmurs  ABDOMEN: Soft, non-distended, No masses, Non tender to palpation  NEURO: No motor deficits, No sensory deficits, Cranial Nerves 2 through 12 grossly intact  PSYCHIATRIC: Oriented to person, place and time, No memory " deficits, Mood appropriate  VASCULAR:  The feet are warm and viable, but I am unable to palpate a distinct posterior tibial or dorsalis pedis pulse.    Labs/Imaging:   I reviewed the cardiac catheterization demonstrating a stent within the left anterior descending coronary and critical disease as well as disease in the right coronary and circumflex system.    Assessment/Plan:  CHRONIC OBSTRUCTIVE PULMONARY DISEASE: This patient has known emphysema and/or a smoking history.  We will obtain and interpret pulmonary function tests and assess arterial blood gas measurements.  If the chronic obstructive pulmonary disease is severe, I will coordinate management with a pulmonary specialist, either preoperatively or in the immediate postoperative period.  Bronchodilator therapy will be needed and smoking cessation, if applicable, will be instituted. The underlying COPD will add complexity to any planned surgical intervention.    DIABETES: I will manage the patient's blood sugars closely, both intraoperatively and postoperatively.  This patient may require a continuous insulin infusion to maintain strict serum glucose control.  I will coordinate the patient's glucose management with the hospital endocrinologist or hospitalist service if the management becomes problematic. The patient is aware that diabetes can complicate their postoperative course and contribute to infection or wound- healing issues.    The patient has had a previous stroke.    Operable significant peripheral vascular disease.  Patient has cramping in the calf muscles, which I do not believe is related to his sodium level.  We are obtaining a CT angiogram of the aorta with complete runoff and 3-dimensional reconstruction today.    He is agreeable to coronary bypass grafting surgery.  He is aware that the surgery has with it a risk of stroke, bleeding, infection, death and renal failure.  No guarantees have been made as to outcome and he agrees to proceed.           Patient Active Problem List   Diagnosis   • Unstable angina   • Angina at rest   • Coronary artery disease involving native coronary artery of native heart with angina pectoris   • Peripheral arterial disease     Signed by: Rakesh Mittal M.D.    4/2/2018    CC:  MD Reny Flores, , editing for Rakesh Mittal M.D.    I, Rakesh Mittal MD, have read and agree with the editing done by Reny Valle, .

## 2018-04-02 NOTE — PAT
PT INSTRUCTED TO START  EXERCISES BEFORE SURGERY.  GIVEN BACTROBAN WITH INSTRUCTIONS FOR USE THE NIGHT BEFORE SURGERY.  ALSO TOLD TO TAKE AN ASPIRIN 325 MG THE NIGHT BEFORE SURGERY.

## 2018-04-05 ENCOUNTER — APPOINTMENT (OUTPATIENT)
Dept: OTHER | Facility: HOSPITAL | Age: 68
End: 2018-04-05
Attending: THORACIC SURGERY (CARDIOTHORACIC VASCULAR SURGERY)

## 2018-04-05 ENCOUNTER — APPOINTMENT (OUTPATIENT)
Dept: GENERAL RADIOLOGY | Facility: HOSPITAL | Age: 68
End: 2018-04-05

## 2018-04-05 ENCOUNTER — HOSPITAL ENCOUNTER (INPATIENT)
Facility: HOSPITAL | Age: 68
LOS: 7 days | Discharge: HOME | End: 2018-04-12
Attending: THORACIC SURGERY (CARDIOTHORACIC VASCULAR SURGERY) | Admitting: THORACIC SURGERY (CARDIOTHORACIC VASCULAR SURGERY)

## 2018-04-05 ENCOUNTER — ANESTHESIA EVENT (OUTPATIENT)
Dept: PERIOP | Facility: HOSPITAL | Age: 68
End: 2018-04-05

## 2018-04-05 ENCOUNTER — ANESTHESIA (OUTPATIENT)
Dept: PERIOP | Facility: HOSPITAL | Age: 68
End: 2018-04-05

## 2018-04-05 DIAGNOSIS — Z74.09 IMPAIRED FUNCTIONAL MOBILITY, BALANCE, GAIT, AND ENDURANCE: Primary | ICD-10-CM

## 2018-04-05 DIAGNOSIS — I25.118 CORONARY ARTERY DISEASE OF NATIVE ARTERY OF NATIVE HEART WITH STABLE ANGINA PECTORIS (HCC): ICD-10-CM

## 2018-04-05 PROBLEM — E78.5 HYPERLIPIDEMIA: Status: ACTIVE | Noted: 2018-04-05

## 2018-04-05 PROBLEM — I25.10 CORONARY ARTERY DISEASE: Status: ACTIVE | Noted: 2018-04-05

## 2018-04-05 PROBLEM — I63.9 STROKE: Status: ACTIVE | Noted: 2018-04-05

## 2018-04-05 PROBLEM — Z95.1 S/P CABG X 2: Status: ACTIVE | Noted: 2018-04-05

## 2018-04-05 PROBLEM — C80.1 CANCER: Status: ACTIVE | Noted: 2018-04-05

## 2018-04-05 PROBLEM — I10 HYPERTENSION: Status: ACTIVE | Noted: 2018-04-05

## 2018-04-05 PROBLEM — K21.9 GERD (GASTROESOPHAGEAL REFLUX DISEASE): Status: ACTIVE | Noted: 2018-04-05

## 2018-04-05 LAB
ABO GROUP BLD: NORMAL
ACT BLD: 125 SECONDS (ref 82–152)
ACT BLD: 125 SECONDS (ref 82–152)
ACT BLD: 571 SECONDS (ref 82–152)
ACT BLD: 582 SECONDS (ref 82–152)
ACT BLD: 626 SECONDS (ref 82–152)
ALBUMIN SERPL-MCNC: 3.6 G/DL (ref 3.2–4.8)
ALBUMIN SERPL-MCNC: 3.8 G/DL (ref 3.2–4.8)
ALBUMIN SERPL-MCNC: 3.9 G/DL (ref 3.2–4.8)
ANION GAP SERPL CALCULATED.3IONS-SCNC: 8 MMOL/L (ref 3–11)
ANION GAP SERPL CALCULATED.3IONS-SCNC: 9 MMOL/L (ref 3–11)
ANION GAP SERPL CALCULATED.3IONS-SCNC: 9 MMOL/L (ref 3–11)
APTT PPP: 30.1 SECONDS (ref 24–31)
ARTERIAL PATENCY WRIST A: ABNORMAL
ATMOSPHERIC PRESS: ABNORMAL MMHG
BACTERIA UR QL AUTO: ABNORMAL /HPF
BASE EXCESS BLDA CALC-SCNC: -0.6 MMOL/L (ref 0–2)
BASE EXCESS BLDA CALC-SCNC: -1 MMOL/L (ref -5–5)
BASE EXCESS BLDA CALC-SCNC: -3 MMOL/L (ref -5–5)
BASE EXCESS BLDA CALC-SCNC: -5 MMOL/L (ref -5–5)
BASE EXCESS BLDA CALC-SCNC: 0 MMOL/L (ref -5–5)
BASE EXCESS BLDA CALC-SCNC: 0 MMOL/L (ref -5–5)
BASE EXCESS BLDA CALC-SCNC: 0.1 MMOL/L (ref 0–2)
BASE EXCESS BLDA CALC-SCNC: 0.2 MMOL/L (ref 0–2)
BDY SITE: ABNORMAL
BILIRUB UR QL STRIP: NEGATIVE
BLD GP AB SCN SERPL QL: NEGATIVE
BUN BLD-MCNC: 16 MG/DL (ref 9–23)
BUN BLD-MCNC: 18 MG/DL (ref 9–23)
BUN BLD-MCNC: 18 MG/DL (ref 9–23)
BUN BLDA-MCNC: 20 MG/DL (ref 8–26)
BUN/CREAT SERPL: 11.3 (ref 7–25)
BUN/CREAT SERPL: 12 (ref 7–25)
BUN/CREAT SERPL: 12.3 (ref 7–25)
CA-I BLDA-SCNC: 1.01 MMOL/L (ref 1.2–1.32)
CA-I BLDA-SCNC: 1.11 MMOL/L (ref 1.2–1.32)
CA-I BLDA-SCNC: 1.15 MMOL/L (ref 1.2–1.32)
CA-I BLDA-SCNC: 1.22 MMOL/L (ref 1.2–1.32)
CA-I BLDA-SCNC: 1.28 MMOL/L (ref 1.2–1.32)
CA-I BLDA-SCNC: 1.66 MMOL/L (ref 1.2–1.32)
CA-I SERPL ISE-MCNC: 1.45 MMOL/L (ref 1.12–1.32)
CALCIUM SPEC-SCNC: 10.3 MG/DL (ref 8.7–10.4)
CALCIUM SPEC-SCNC: 9 MG/DL (ref 8.7–10.4)
CALCIUM SPEC-SCNC: 9.5 MG/DL (ref 8.7–10.4)
CHLORIDE BLDA-SCNC: 99 MMOL/L (ref 98–109)
CHLORIDE SERPL-SCNC: 103 MMOL/L (ref 99–109)
CHLORIDE SERPL-SCNC: 95 MMOL/L (ref 99–109)
CHLORIDE SERPL-SCNC: 96 MMOL/L (ref 99–109)
CLARITY UR: CLEAR
CO2 BLDA-SCNC: 23 MMOL/L (ref 24–29)
CO2 BLDA-SCNC: 24 MMOL/L (ref 24–29)
CO2 BLDA-SCNC: 25.5 MMOL/L (ref 22–33)
CO2 BLDA-SCNC: 26 MMOL/L (ref 24–29)
CO2 BLDA-SCNC: 26.4 MMOL/L (ref 22–33)
CO2 BLDA-SCNC: 27 MMOL/L (ref 22–33)
CO2 BLDA-SCNC: 27 MMOL/L (ref 24–29)
CO2 SERPL-SCNC: 25 MMOL/L (ref 20–31)
CO2 SERPL-SCNC: 25 MMOL/L (ref 20–31)
CO2 SERPL-SCNC: 27 MMOL/L (ref 20–31)
COHGB MFR BLD: 0.6 % (ref 0–2)
COHGB MFR BLD: 0.9 % (ref 0–2)
COHGB MFR BLD: 1 % (ref 0–2)
COLOR UR: YELLOW
CREAT BLD-MCNC: 1.3 MG/DL (ref 0.6–1.3)
CREAT BLD-MCNC: 1.5 MG/DL (ref 0.6–1.3)
CREAT BLD-MCNC: 1.6 MG/DL (ref 0.6–1.3)
CREAT BLDA-MCNC: 1.3 MG/DL (ref 0.6–1.3)
DEPRECATED RDW RBC AUTO: 42.3 FL (ref 37–54)
DEPRECATED RDW RBC AUTO: 42.8 FL (ref 37–54)
DEPRECATED RDW RBC AUTO: 43.6 FL (ref 37–54)
ERYTHROCYTE [DISTWIDTH] IN BLOOD BY AUTOMATED COUNT: 12.9 % (ref 11.3–14.5)
GFR SERPL CREATININE-BSD FRML MDRD: 43 ML/MIN/1.73
GFR SERPL CREATININE-BSD FRML MDRD: 47 ML/MIN/1.73
GFR SERPL CREATININE-BSD FRML MDRD: 55 ML/MIN/1.73
GLUCOSE BLD-MCNC: 122 MG/DL (ref 70–100)
GLUCOSE BLD-MCNC: 129 MG/DL (ref 70–100)
GLUCOSE BLD-MCNC: 136 MG/DL (ref 70–100)
GLUCOSE BLDC GLUCOMTR-MCNC: 108 MG/DL (ref 70–130)
GLUCOSE BLDC GLUCOMTR-MCNC: 111 MG/DL (ref 70–130)
GLUCOSE BLDC GLUCOMTR-MCNC: 117 MG/DL (ref 70–130)
GLUCOSE BLDC GLUCOMTR-MCNC: 124 MG/DL (ref 70–130)
GLUCOSE BLDC GLUCOMTR-MCNC: 130 MG/DL (ref 70–130)
GLUCOSE BLDC GLUCOMTR-MCNC: 131 MG/DL (ref 70–130)
GLUCOSE BLDC GLUCOMTR-MCNC: 132 MG/DL (ref 70–130)
GLUCOSE BLDC GLUCOMTR-MCNC: 133 MG/DL (ref 70–130)
GLUCOSE BLDC GLUCOMTR-MCNC: 133 MG/DL (ref 70–130)
GLUCOSE BLDC GLUCOMTR-MCNC: 135 MG/DL (ref 70–130)
GLUCOSE BLDC GLUCOMTR-MCNC: 135 MG/DL (ref 70–130)
GLUCOSE BLDC GLUCOMTR-MCNC: 136 MG/DL (ref 70–130)
GLUCOSE BLDC GLUCOMTR-MCNC: 138 MG/DL (ref 70–130)
GLUCOSE BLDC GLUCOMTR-MCNC: 154 MG/DL (ref 70–130)
GLUCOSE BLDC GLUCOMTR-MCNC: 161 MG/DL (ref 70–130)
GLUCOSE UR STRIP-MCNC: NEGATIVE MG/DL
HCO3 BLDA-SCNC: 21.8 MMOL/L (ref 22–26)
HCO3 BLDA-SCNC: 22.5 MMOL/L (ref 22–26)
HCO3 BLDA-SCNC: 24.4 MMOL/L (ref 20–26)
HCO3 BLDA-SCNC: 24.5 MMOL/L (ref 22–26)
HCO3 BLDA-SCNC: 25.1 MMOL/L (ref 20–26)
HCO3 BLDA-SCNC: 25.1 MMOL/L (ref 22–26)
HCO3 BLDA-SCNC: 25.3 MMOL/L (ref 22–26)
HCO3 BLDA-SCNC: 25.6 MMOL/L (ref 20–26)
HCT VFR BLD AUTO: 27.8 % (ref 38.9–50.9)
HCT VFR BLD AUTO: 28.7 % (ref 38.9–50.9)
HCT VFR BLD AUTO: 29.6 % (ref 38.9–50.9)
HCT VFR BLD CALC: 30.7 %
HCT VFR BLD CALC: 30.8 %
HCT VFR BLD CALC: 35.8 %
HCT VFR BLDA CALC: 26 % (ref 38–51)
HCT VFR BLDA CALC: 26 % (ref 38–51)
HCT VFR BLDA CALC: 27 % (ref 38–51)
HCT VFR BLDA CALC: 32 % (ref 38–51)
HCT VFR BLDA CALC: 34 % (ref 38–51)
HCT VFR BLDA CALC: 38 % (ref 38–51)
HGB BLD-MCNC: 9.8 G/DL (ref 13.1–17.5)
HGB BLD-MCNC: 9.9 G/DL (ref 13.1–17.5)
HGB BLD-MCNC: 9.9 G/DL (ref 13.1–17.5)
HGB BLDA-MCNC: 10 G/DL (ref 13.5–17.5)
HGB BLDA-MCNC: 10.1 G/DL (ref 13.5–17.5)
HGB BLDA-MCNC: 10.9 G/DL (ref 12–17)
HGB BLDA-MCNC: 11.6 G/DL (ref 12–17)
HGB BLDA-MCNC: 11.7 G/DL (ref 13.5–17.5)
HGB BLDA-MCNC: 12.9 G/DL (ref 12–17)
HGB BLDA-MCNC: 8.8 G/DL (ref 12–17)
HGB BLDA-MCNC: 8.8 G/DL (ref 12–17)
HGB BLDA-MCNC: 9.2 G/DL (ref 12–17)
HGB UR QL STRIP.AUTO: ABNORMAL
HOROWITZ INDEX BLD+IHG-RTO: 100 %
HOROWITZ INDEX BLD+IHG-RTO: 35 %
HOROWITZ INDEX BLD+IHG-RTO: 35 %
HYALINE CASTS UR QL AUTO: ABNORMAL /LPF
INR PPP: 1.21 (ref 0.91–1.09)
KETONES UR QL STRIP: NEGATIVE
LEUKOCYTE ESTERASE UR QL STRIP.AUTO: NEGATIVE
MAGNESIUM SERPL-MCNC: 2.1 MG/DL (ref 1.3–2.7)
MAGNESIUM SERPL-MCNC: 2.4 MG/DL (ref 1.3–2.7)
MAGNESIUM SERPL-MCNC: 2.8 MG/DL (ref 1.3–2.7)
MCH RBC QN AUTO: 30.7 PG (ref 27–31)
MCH RBC QN AUTO: 31.3 PG (ref 27–31)
MCH RBC QN AUTO: 32 PG (ref 27–31)
MCHC RBC AUTO-ENTMCNC: 33.4 G/DL (ref 32–36)
MCHC RBC AUTO-ENTMCNC: 34.5 G/DL (ref 32–36)
MCHC RBC AUTO-ENTMCNC: 35.3 G/DL (ref 32–36)
MCV RBC AUTO: 90.8 FL (ref 80–99)
MCV RBC AUTO: 90.8 FL (ref 80–99)
MCV RBC AUTO: 91.9 FL (ref 80–99)
METHGB BLD QL: 1.2 % (ref 0–1.5)
METHGB BLD QL: 1.3 % (ref 0–1.5)
METHGB BLD QL: 1.3 % (ref 0–1.5)
MODALITY: ABNORMAL
NITRITE UR QL STRIP: NEGATIVE
OXYHGB MFR BLDV: 96.1 % (ref 94–99)
OXYHGB MFR BLDV: 96.4 % (ref 94–99)
OXYHGB MFR BLDV: 98 % (ref 94–99)
PCO2 BLDA: 36.9 MM HG (ref 35–48)
PCO2 BLDA: 37.3 MM HG (ref 35–45)
PCO2 BLDA: 40.7 MM HG (ref 35–45)
PCO2 BLDA: 44.2 MM HG (ref 35–48)
PCO2 BLDA: 44.6 MM HG (ref 35–48)
PCO2 BLDA: 45 MM HG (ref 35–45)
PCO2 BLDA: 46.5 MM HG (ref 35–45)
PCO2 BLDA: 48.1 MM HG (ref 35–45)
PH BLDA: 7.29 PH UNITS (ref 7.35–7.6)
PH BLDA: 7.33 PH UNITS (ref 7.35–7.6)
PH BLDA: 7.34 PH UNITS (ref 7.35–7.6)
PH BLDA: 7.35 PH UNITS (ref 7.35–7.6)
PH BLDA: 7.36 PH UNITS (ref 7.35–7.45)
PH BLDA: 7.37 PH UNITS (ref 7.35–7.45)
PH BLDA: 7.42 PH UNITS (ref 7.35–7.6)
PH BLDA: 7.43 PH UNITS (ref 7.35–7.45)
PH UR STRIP.AUTO: 6 [PH] (ref 5–8)
PHOSPHATE SERPL-MCNC: 3.4 MG/DL (ref 2.4–5.1)
PHOSPHATE SERPL-MCNC: 4.1 MG/DL (ref 2.4–5.1)
PHOSPHATE SERPL-MCNC: 4.8 MG/DL (ref 2.4–5.1)
PLATELET # BLD AUTO: 167 10*3/MM3 (ref 150–450)
PLATELET # BLD AUTO: 176 10*3/MM3 (ref 150–450)
PLATELET # BLD AUTO: 183 10*3/MM3 (ref 150–450)
PMV BLD AUTO: 9.5 FL (ref 6–12)
PMV BLD AUTO: 9.7 FL (ref 6–12)
PMV BLD AUTO: 9.8 FL (ref 6–12)
PO2 BLDA: 100 MM HG (ref 83–108)
PO2 BLDA: 110 MM HG (ref 83–108)
PO2 BLDA: 110 MMHG (ref 80–105)
PO2 BLDA: 197 MMHG (ref 80–105)
PO2 BLDA: 318 MMHG (ref 80–105)
PO2 BLDA: 333 MM HG (ref 83–108)
PO2 BLDA: 336 MMHG (ref 80–105)
PO2 BLDA: 355 MMHG (ref 80–105)
POTASSIUM BLD-SCNC: 3.9 MMOL/L (ref 3.5–5.5)
POTASSIUM BLD-SCNC: 4.2 MMOL/L (ref 3.5–5.5)
POTASSIUM BLD-SCNC: 4.7 MMOL/L (ref 3.5–5.5)
POTASSIUM BLDA-SCNC: 4.2 MMOL/L (ref 3.5–4.9)
POTASSIUM BLDA-SCNC: 4.5 MMOL/L (ref 3.5–4.9)
POTASSIUM BLDA-SCNC: 4.6 MMOL/L (ref 3.5–4.9)
POTASSIUM BLDA-SCNC: 4.8 MMOL/L (ref 3.5–4.9)
POTASSIUM BLDA-SCNC: 5.1 MMOL/L (ref 3.5–4.9)
POTASSIUM BLDA-SCNC: 6.2 MMOL/L (ref 3.5–4.9)
PROT UR QL STRIP: NEGATIVE
PROTHROMBIN TIME: 12.7 SECONDS (ref 9.6–11.5)
RBC # BLD AUTO: 3.06 10*6/MM3 (ref 4.2–5.76)
RBC # BLD AUTO: 3.16 10*6/MM3 (ref 4.2–5.76)
RBC # BLD AUTO: 3.22 10*6/MM3 (ref 4.2–5.76)
RBC # UR: ABNORMAL /HPF
REF LAB TEST METHOD: ABNORMAL
RH BLD: POSITIVE
SAO2 % BLDA: 100 % (ref 95–98)
SAO2 % BLDA: 98 % (ref 95–98)
SODIUM BLD-SCNC: 130 MMOL/L (ref 132–146)
SODIUM BLD-SCNC: 131 MMOL/L (ref 132–146)
SODIUM BLD-SCNC: 136 MMOL/L (ref 132–146)
SODIUM BLDA-SCNC: 130 MMOL/L (ref 138–146)
SODIUM BLDA-SCNC: 130 MMOL/L (ref 138–146)
SODIUM BLDA-SCNC: 131 MMOL/L (ref 138–146)
SODIUM BLDA-SCNC: 133 MMOL/L (ref 138–146)
SODIUM BLDA-SCNC: 135 MMOL/L (ref 138–146)
SODIUM BLDA-SCNC: 135 MMOL/L (ref 138–146)
SP GR UR STRIP: 1.01 (ref 1–1.03)
SQUAMOUS #/AREA URNS HPF: ABNORMAL /HPF
T&S EXPIRATION DATE: NORMAL
UROBILINOGEN UR QL STRIP: ABNORMAL
WBC NRBC COR # BLD: 9.16 10*3/MM3 (ref 3.5–10.8)
WBC NRBC COR # BLD: 9.39 10*3/MM3 (ref 3.5–10.8)
WBC NRBC COR # BLD: 9.69 10*3/MM3 (ref 3.5–10.8)
WBC UR QL AUTO: ABNORMAL /HPF

## 2018-04-05 PROCEDURE — 85347 COAGULATION TIME ACTIVATED: CPT

## 2018-04-05 PROCEDURE — 06BQ4ZZ EXCISION OF LEFT SAPHENOUS VEIN, PERCUTANEOUS ENDOSCOPIC APPROACH: ICD-10-PCS | Performed by: THORACIC SURGERY (CARDIOTHORACIC VASCULAR SURGERY)

## 2018-04-05 PROCEDURE — 85730 THROMBOPLASTIN TIME PARTIAL: CPT | Performed by: PHYSICIAN ASSISTANT

## 2018-04-05 PROCEDURE — 25010000002 PROTAMINE SULFATE PER 10 MG: Performed by: PHYSICIAN ASSISTANT

## 2018-04-05 PROCEDURE — 86850 RBC ANTIBODY SCREEN: CPT | Performed by: PHYSICIAN ASSISTANT

## 2018-04-05 PROCEDURE — 94799 UNLISTED PULMONARY SVC/PX: CPT

## 2018-04-05 PROCEDURE — 87086 URINE CULTURE/COLONY COUNT: CPT | Performed by: PHYSICIAN ASSISTANT

## 2018-04-05 PROCEDURE — 25010000002 HEPARIN (PORCINE) PER 1000 UNITS: Performed by: THORACIC SURGERY (CARDIOTHORACIC VASCULAR SURGERY)

## 2018-04-05 PROCEDURE — 25010000002 PROTAMINE SULFATE PER 10 MG: Performed by: ANESTHESIOLOGY

## 2018-04-05 PROCEDURE — 82803 BLOOD GASES ANY COMBINATION: CPT

## 2018-04-05 PROCEDURE — 25010000002 FENTANYL CITRATE (PF) 100 MCG/2ML SOLUTION: Performed by: THORACIC SURGERY (CARDIOTHORACIC VASCULAR SURGERY)

## 2018-04-05 PROCEDURE — 33533 CABG ARTERIAL SINGLE: CPT | Performed by: THORACIC SURGERY (CARDIOTHORACIC VASCULAR SURGERY)

## 2018-04-05 PROCEDURE — 25010000002 MAGNESIUM SULFATE PER 500 MG OF MAGNESIUM

## 2018-04-05 PROCEDURE — 94760 N-INVAS EAR/PLS OXIMETRY 1: CPT

## 2018-04-05 PROCEDURE — 93010 ELECTROCARDIOGRAM REPORT: CPT | Performed by: INTERNAL MEDICINE

## 2018-04-05 PROCEDURE — 25010000002 FUROSEMIDE PER 20 MG

## 2018-04-05 PROCEDURE — 85027 COMPLETE CBC AUTOMATED: CPT | Performed by: THORACIC SURGERY (CARDIOTHORACIC VASCULAR SURGERY)

## 2018-04-05 PROCEDURE — 85027 COMPLETE CBC AUTOMATED: CPT | Performed by: PHYSICIAN ASSISTANT

## 2018-04-05 PROCEDURE — 80069 RENAL FUNCTION PANEL: CPT | Performed by: PHYSICIAN ASSISTANT

## 2018-04-05 PROCEDURE — 82330 ASSAY OF CALCIUM: CPT | Performed by: PHYSICIAN ASSISTANT

## 2018-04-05 PROCEDURE — 99233 SBSQ HOSP IP/OBS HIGH 50: CPT | Performed by: INTERNAL MEDICINE

## 2018-04-05 PROCEDURE — 84132 ASSAY OF SERUM POTASSIUM: CPT

## 2018-04-05 PROCEDURE — 25010000002 ALBUMIN HUMAN 5% PER 50 ML: Performed by: PHYSICIAN ASSISTANT

## 2018-04-05 PROCEDURE — 25010000002 MANNITOL PER 50 ML

## 2018-04-05 PROCEDURE — 25010000002 PAPAVERINE PER 60 MG: Performed by: THORACIC SURGERY (CARDIOTHORACIC VASCULAR SURGERY)

## 2018-04-05 PROCEDURE — 82805 BLOOD GASES W/O2 SATURATION: CPT | Performed by: PHYSICIAN ASSISTANT

## 2018-04-05 PROCEDURE — 25010000002 DOBUTAMINE PER 250 MG: Performed by: PHYSICIAN ASSISTANT

## 2018-04-05 PROCEDURE — 71045 X-RAY EXAM CHEST 1 VIEW: CPT

## 2018-04-05 PROCEDURE — 25010000002 PROPOFOL 10 MG/ML EMULSION: Performed by: ANESTHESIOLOGY

## 2018-04-05 PROCEDURE — 80069 RENAL FUNCTION PANEL: CPT | Performed by: THORACIC SURGERY (CARDIOTHORACIC VASCULAR SURGERY)

## 2018-04-05 PROCEDURE — 33517 CABG ARTERY-VEIN SINGLE: CPT | Performed by: PHYSICIAN ASSISTANT

## 2018-04-05 PROCEDURE — 82330 ASSAY OF CALCIUM: CPT

## 2018-04-05 PROCEDURE — 84295 ASSAY OF SERUM SODIUM: CPT

## 2018-04-05 PROCEDURE — 25010000002 MORPHINE SULFATE (PF) 2 MG/ML SOLUTION: Performed by: THORACIC SURGERY (CARDIOTHORACIC VASCULAR SURGERY)

## 2018-04-05 PROCEDURE — 25010000002 CEFUROXIME PER 750 MG: Performed by: PHYSICIAN ASSISTANT

## 2018-04-05 PROCEDURE — 25810000003 DEXTROSE 5 % WITH KCL 20 MEQ 20-5 MEQ/L-% SOLUTION: Performed by: PHYSICIAN ASSISTANT

## 2018-04-05 PROCEDURE — 93005 ELECTROCARDIOGRAM TRACING: CPT | Performed by: PHYSICIAN ASSISTANT

## 2018-04-05 PROCEDURE — 36430 TRANSFUSION BLD/BLD COMPNT: CPT

## 2018-04-05 PROCEDURE — 81001 URINALYSIS AUTO W/SCOPE: CPT | Performed by: PHYSICIAN ASSISTANT

## 2018-04-05 PROCEDURE — 25010000002 PHENYLEPHRINE PER 1 ML

## 2018-04-05 PROCEDURE — 021009W BYPASS CORONARY ARTERY, ONE ARTERY FROM AORTA WITH AUTOLOGOUS VENOUS TISSUE, OPEN APPROACH: ICD-10-PCS | Performed by: THORACIC SURGERY (CARDIOTHORACIC VASCULAR SURGERY)

## 2018-04-05 PROCEDURE — 25010000002 AMIODARONE PER 30 MG

## 2018-04-05 PROCEDURE — 25010000002 MIDAZOLAM PER 1 MG: Performed by: ANESTHESIOLOGY

## 2018-04-05 PROCEDURE — 85610 PROTHROMBIN TIME: CPT | Performed by: PHYSICIAN ASSISTANT

## 2018-04-05 PROCEDURE — P9041 ALBUMIN (HUMAN),5%, 50ML: HCPCS | Performed by: PHYSICIAN ASSISTANT

## 2018-04-05 PROCEDURE — 25010000002 AMIODARONE PER 30 MG: Performed by: ANESTHESIOLOGY

## 2018-04-05 PROCEDURE — 82947 ASSAY GLUCOSE BLOOD QUANT: CPT

## 2018-04-05 PROCEDURE — 25010000003 CEFAZOLIN IN DEXTROSE 2-4 GM/100ML-% SOLUTION: Performed by: PHYSICIAN ASSISTANT

## 2018-04-05 PROCEDURE — 02100Z9 BYPASS CORONARY ARTERY, ONE ARTERY FROM LEFT INTERNAL MAMMARY, OPEN APPROACH: ICD-10-PCS | Performed by: THORACIC SURGERY (CARDIOTHORACIC VASCULAR SURGERY)

## 2018-04-05 PROCEDURE — 33508 ENDOSCOPIC VEIN HARVEST: CPT | Performed by: THORACIC SURGERY (CARDIOTHORACIC VASCULAR SURGERY)

## 2018-04-05 PROCEDURE — P9037 PLATE PHERES LEUKOREDU IRRAD: HCPCS

## 2018-04-05 PROCEDURE — 25010000002 POTASSIUM CHLORIDE PER 2 MEQ OF POTASSIUM

## 2018-04-05 PROCEDURE — 85014 HEMATOCRIT: CPT

## 2018-04-05 PROCEDURE — 33517 CABG ARTERY-VEIN SINGLE: CPT | Performed by: THORACIC SURGERY (CARDIOTHORACIC VASCULAR SURGERY)

## 2018-04-05 PROCEDURE — A4648 IMPLANTABLE TISSUE MARKER: HCPCS | Performed by: THORACIC SURGERY (CARDIOTHORACIC VASCULAR SURGERY)

## 2018-04-05 PROCEDURE — P9035 PLATELET PHERES LEUKOREDUCED: HCPCS

## 2018-04-05 PROCEDURE — 25010000002 AMIODARONE IN DEXTROSE 5% 360-4.14 MG/200ML-% SOLUTION: Performed by: THORACIC SURGERY (CARDIOTHORACIC VASCULAR SURGERY)

## 2018-04-05 PROCEDURE — 86900 BLOOD TYPING SEROLOGIC ABO: CPT | Performed by: PHYSICIAN ASSISTANT

## 2018-04-05 PROCEDURE — C1894 INTRO/SHEATH, NON-LASER: HCPCS | Performed by: THORACIC SURGERY (CARDIOTHORACIC VASCULAR SURGERY)

## 2018-04-05 PROCEDURE — 5A1221Z PERFORMANCE OF CARDIAC OUTPUT, CONTINUOUS: ICD-10-PCS | Performed by: THORACIC SURGERY (CARDIOTHORACIC VASCULAR SURGERY)

## 2018-04-05 PROCEDURE — 86901 BLOOD TYPING SEROLOGIC RH(D): CPT | Performed by: PHYSICIAN ASSISTANT

## 2018-04-05 PROCEDURE — 82805 BLOOD GASES W/O2 SATURATION: CPT | Performed by: THORACIC SURGERY (CARDIOTHORACIC VASCULAR SURGERY)

## 2018-04-05 PROCEDURE — 33533 CABG ARTERIAL SINGLE: CPT | Performed by: PHYSICIAN ASSISTANT

## 2018-04-05 PROCEDURE — 83735 ASSAY OF MAGNESIUM: CPT | Performed by: PHYSICIAN ASSISTANT

## 2018-04-05 PROCEDURE — 25010000002 PROPOFOL 1000 MG/ML EMULSION: Performed by: THORACIC SURGERY (CARDIOTHORACIC VASCULAR SURGERY)

## 2018-04-05 PROCEDURE — C1751 CATH, INF, PER/CENT/MIDLINE: HCPCS | Performed by: ANESTHESIOLOGY

## 2018-04-05 PROCEDURE — 25010000002 HEPARIN (PORCINE) PER 1000 UNITS: Performed by: ANESTHESIOLOGY

## 2018-04-05 PROCEDURE — 83735 ASSAY OF MAGNESIUM: CPT | Performed by: THORACIC SURGERY (CARDIOTHORACIC VASCULAR SURGERY)

## 2018-04-05 PROCEDURE — 25010000002 ONDANSETRON PER 1 MG: Performed by: PHYSICIAN ASSISTANT

## 2018-04-05 PROCEDURE — 25010000002 AMIODARONE IN DEXTROSE 5% 360-4.14 MG/200ML-% SOLUTION

## 2018-04-05 PROCEDURE — 94002 VENT MGMT INPAT INIT DAY: CPT

## 2018-04-05 PROCEDURE — 86923 COMPATIBILITY TEST ELECTRIC: CPT

## 2018-04-05 PROCEDURE — 25010000002 HEPARIN (PORCINE) PER 1000 UNITS

## 2018-04-05 PROCEDURE — 25010000002 PHENYLEPHRINE PER 1 ML: Performed by: ANESTHESIOLOGY

## 2018-04-05 DEVICE — DISK-SHAPED STYLE, SILICONE (1 PER STERILE PKG)
Type: IMPLANTABLE DEVICE | Site: CHEST | Status: FUNCTIONAL
Brand: SCANLAN® RADIOMARK® GRAFT MARKERS

## 2018-04-05 RX ORDER — VECURONIUM BROMIDE 1 MG/ML
INJECTION, POWDER, LYOPHILIZED, FOR SOLUTION INTRAVENOUS AS NEEDED
Status: DISCONTINUED | OUTPATIENT
Start: 2018-04-05 | End: 2018-04-05 | Stop reason: SURG

## 2018-04-05 RX ORDER — NITROGLYCERIN 0.4 MG/1
0.4 TABLET SUBLINGUAL
Status: DISCONTINUED | OUTPATIENT
Start: 2018-04-05 | End: 2018-04-05 | Stop reason: HOSPADM

## 2018-04-05 RX ORDER — POTASSIUM CHLORIDE, DEXTROSE MONOHYDRATE 150; 5 MG/100ML; G/100ML
30 INJECTION, SOLUTION INTRAVENOUS CONTINUOUS
Status: DISCONTINUED | OUTPATIENT
Start: 2018-04-05 | End: 2018-04-06

## 2018-04-05 RX ORDER — AMIODARONE HYDROCHLORIDE 50 MG/ML
INJECTION, SOLUTION INTRAVENOUS AS NEEDED
Status: DISCONTINUED | OUTPATIENT
Start: 2018-04-05 | End: 2018-04-05 | Stop reason: SURG

## 2018-04-05 RX ORDER — ASPIRIN 325 MG
325 TABLET ORAL ONCE
Status: COMPLETED | OUTPATIENT
Start: 2018-04-05 | End: 2018-04-05

## 2018-04-05 RX ORDER — HEPARIN SODIUM 1000 [USP'U]/ML
INJECTION, SOLUTION INTRAVENOUS; SUBCUTANEOUS AS NEEDED
Status: DISCONTINUED | OUTPATIENT
Start: 2018-04-05 | End: 2018-04-05 | Stop reason: SURG

## 2018-04-05 RX ORDER — LIDOCAINE HYDROCHLORIDE 10 MG/ML
0.5 INJECTION, SOLUTION EPIDURAL; INFILTRATION; INTRACAUDAL; PERINEURAL ONCE AS NEEDED
Status: COMPLETED | OUTPATIENT
Start: 2018-04-05 | End: 2018-04-05

## 2018-04-05 RX ORDER — MAGNESIUM SULFATE HEPTAHYDRATE 40 MG/ML
4 INJECTION, SOLUTION INTRAVENOUS AS NEEDED
Status: DISCONTINUED | OUTPATIENT
Start: 2018-04-05 | End: 2018-04-12 | Stop reason: HOSPADM

## 2018-04-05 RX ORDER — PROTAMINE SULFATE 10 MG/ML
INJECTION, SOLUTION INTRAVENOUS AS NEEDED
Status: DISCONTINUED | OUTPATIENT
Start: 2018-04-05 | End: 2018-04-05 | Stop reason: SURG

## 2018-04-05 RX ORDER — SODIUM CHLORIDE 0.9 % (FLUSH) 0.9 %
1-10 SYRINGE (ML) INJECTION AS NEEDED
Status: DISCONTINUED | OUTPATIENT
Start: 2018-04-05 | End: 2018-04-05 | Stop reason: HOSPADM

## 2018-04-05 RX ORDER — SENNA AND DOCUSATE SODIUM 50; 8.6 MG/1; MG/1
2 TABLET, FILM COATED ORAL 2 TIMES DAILY PRN
Status: DISCONTINUED | OUTPATIENT
Start: 2018-04-05 | End: 2018-04-12 | Stop reason: HOSPADM

## 2018-04-05 RX ORDER — ROCURONIUM BROMIDE 10 MG/ML
INJECTION, SOLUTION INTRAVENOUS AS NEEDED
Status: DISCONTINUED | OUTPATIENT
Start: 2018-04-05 | End: 2018-04-05 | Stop reason: SURG

## 2018-04-05 RX ORDER — POTASSIUM CHLORIDE 29.8 MG/ML
20 INJECTION INTRAVENOUS
Status: DISCONTINUED | OUTPATIENT
Start: 2018-04-05 | End: 2018-04-12 | Stop reason: HOSPADM

## 2018-04-05 RX ORDER — PHENYLEPHRINE HCL IN 0.9% NACL 0.5 MG/5ML
.5-3 SYRINGE (ML) INTRAVENOUS ONCE
Status: DISCONTINUED | OUTPATIENT
Start: 2018-04-05 | End: 2018-04-05 | Stop reason: HOSPADM

## 2018-04-05 RX ORDER — BISACODYL 5 MG/1
10 TABLET, DELAYED RELEASE ORAL DAILY PRN
Status: DISCONTINUED | OUTPATIENT
Start: 2018-04-05 | End: 2018-04-12 | Stop reason: HOSPADM

## 2018-04-05 RX ORDER — OXYCODONE HYDROCHLORIDE AND ACETAMINOPHEN 5; 325 MG/1; MG/1
2 TABLET ORAL EVERY 4 HOURS PRN
Status: DISCONTINUED | OUTPATIENT
Start: 2018-04-05 | End: 2018-04-12 | Stop reason: HOSPADM

## 2018-04-05 RX ORDER — CEFAZOLIN SODIUM 2 G/100ML
2 INJECTION, SOLUTION INTRAVENOUS ONCE
Status: COMPLETED | OUTPATIENT
Start: 2018-04-05 | End: 2018-04-05

## 2018-04-05 RX ORDER — ATORVASTATIN CALCIUM 40 MG/1
40 TABLET, FILM COATED ORAL NIGHTLY
Status: DISCONTINUED | OUTPATIENT
Start: 2018-04-05 | End: 2018-04-12 | Stop reason: HOSPADM

## 2018-04-05 RX ORDER — DEXMEDETOMIDINE HYDROCHLORIDE 4 UG/ML
.2-1.5 INJECTION, SOLUTION INTRAVENOUS CONTINUOUS PRN
Status: DISCONTINUED | OUTPATIENT
Start: 2018-04-05 | End: 2018-04-06

## 2018-04-05 RX ORDER — THROMBIN HUMAN AND FIBRINOGEN 2; 5.5 [USP'U]/1; MG/1
PATCH TOPICAL AS NEEDED
Status: DISCONTINUED | OUTPATIENT
Start: 2018-04-05 | End: 2018-04-05 | Stop reason: HOSPADM

## 2018-04-05 RX ORDER — NALOXONE HCL 0.4 MG/ML
0.4 VIAL (ML) INJECTION
Status: DISCONTINUED | OUTPATIENT
Start: 2018-04-05 | End: 2018-04-12 | Stop reason: HOSPADM

## 2018-04-05 RX ORDER — ACETAMINOPHEN 325 MG/1
650 TABLET ORAL EVERY 4 HOURS PRN
Status: DISCONTINUED | OUTPATIENT
Start: 2018-04-05 | End: 2018-04-12 | Stop reason: HOSPADM

## 2018-04-05 RX ORDER — LIDOCAINE HYDROCHLORIDE 20 MG/ML
INJECTION, SOLUTION INFILTRATION; PERINEURAL AS NEEDED
Status: DISCONTINUED | OUTPATIENT
Start: 2018-04-05 | End: 2018-04-05 | Stop reason: SURG

## 2018-04-05 RX ORDER — ALBUMIN, HUMAN INJ 5% 5 %
500 SOLUTION INTRAVENOUS ONCE
Status: COMPLETED | OUTPATIENT
Start: 2018-04-06 | End: 2018-04-06

## 2018-04-05 RX ORDER — FAMOTIDINE 20 MG/1
20 TABLET, FILM COATED ORAL ONCE
Status: COMPLETED | OUTPATIENT
Start: 2018-04-05 | End: 2018-04-05

## 2018-04-05 RX ORDER — CALCIUM CHLORIDE 100 MG/ML
INJECTION INTRAVENOUS; INTRAVENTRICULAR AS NEEDED
Status: DISCONTINUED | OUTPATIENT
Start: 2018-04-05 | End: 2018-04-05 | Stop reason: SURG

## 2018-04-05 RX ORDER — LEVOTHYROXINE SODIUM 112 UG/1
55 TABLET ORAL
Status: DISCONTINUED | OUTPATIENT
Start: 2018-04-05 | End: 2018-04-06

## 2018-04-05 RX ORDER — DOCUSATE SODIUM 100 MG/1
100 CAPSULE, LIQUID FILLED ORAL 2 TIMES DAILY PRN
Status: DISCONTINUED | OUTPATIENT
Start: 2018-04-05 | End: 2018-04-12 | Stop reason: HOSPADM

## 2018-04-05 RX ORDER — DOPAMINE HYDROCHLORIDE 160 MG/100ML
2-20 INJECTION, SOLUTION INTRAVENOUS CONTINUOUS PRN
Status: DISCONTINUED | OUTPATIENT
Start: 2018-04-05 | End: 2018-04-06

## 2018-04-05 RX ORDER — CHLORHEXIDINE GLUCONATE 0.12 MG/ML
15 RINSE ORAL ONCE
Status: COMPLETED | OUTPATIENT
Start: 2018-04-05 | End: 2018-04-05

## 2018-04-05 RX ORDER — MAGNESIUM SULFATE HEPTAHYDRATE 40 MG/ML
2 INJECTION, SOLUTION INTRAVENOUS AS NEEDED
Status: DISCONTINUED | OUTPATIENT
Start: 2018-04-05 | End: 2018-04-12 | Stop reason: HOSPADM

## 2018-04-05 RX ORDER — LIOTHYRONINE SODIUM 5 UG/1
5 TABLET ORAL 2 TIMES DAILY
COMMUNITY
End: 2022-04-26

## 2018-04-05 RX ORDER — FENTANYL CITRATE 50 UG/ML
25 INJECTION, SOLUTION INTRAMUSCULAR; INTRAVENOUS
Status: DISCONTINUED | OUTPATIENT
Start: 2018-04-05 | End: 2018-04-12 | Stop reason: HOSPADM

## 2018-04-05 RX ORDER — SUFENTANIL CITRATE 50 UG/ML
INJECTION EPIDURAL; INTRAVENOUS AS NEEDED
Status: DISCONTINUED | OUTPATIENT
Start: 2018-04-05 | End: 2018-04-05 | Stop reason: SURG

## 2018-04-05 RX ORDER — BISACODYL 10 MG
10 SUPPOSITORY, RECTAL RECTAL DAILY PRN
Status: DISCONTINUED | OUTPATIENT
Start: 2018-04-06 | End: 2018-04-12 | Stop reason: HOSPADM

## 2018-04-05 RX ORDER — PANTOPRAZOLE SODIUM 40 MG/10ML
40 INJECTION, POWDER, LYOPHILIZED, FOR SOLUTION INTRAVENOUS ONCE
Status: DISCONTINUED | OUTPATIENT
Start: 2018-04-05 | End: 2018-04-05 | Stop reason: HOSPADM

## 2018-04-05 RX ORDER — SODIUM CHLORIDE 9 MG/ML
30 INJECTION, SOLUTION INTRAVENOUS CONTINUOUS PRN
Status: DISCONTINUED | OUTPATIENT
Start: 2018-04-05 | End: 2018-04-06

## 2018-04-05 RX ORDER — CHLORHEXIDINE GLUCONATE 500 MG/1
1 CLOTH TOPICAL EVERY 12 HOURS PRN
Status: DISCONTINUED | OUTPATIENT
Start: 2018-04-05 | End: 2018-04-05 | Stop reason: HOSPADM

## 2018-04-05 RX ORDER — ALBUMIN, HUMAN INJ 5% 5 %
500 SOLUTION INTRAVENOUS AS NEEDED
Status: COMPLETED | OUTPATIENT
Start: 2018-04-05 | End: 2018-04-05

## 2018-04-05 RX ORDER — ASPIRIN 325 MG
325 TABLET, DELAYED RELEASE (ENTERIC COATED) ORAL DAILY
Status: DISCONTINUED | OUTPATIENT
Start: 2018-04-06 | End: 2018-04-08

## 2018-04-05 RX ORDER — HYDROCODONE BITARTRATE AND ACETAMINOPHEN 7.5; 325 MG/1; MG/1
1 TABLET ORAL EVERY 4 HOURS PRN
Status: DISCONTINUED | OUTPATIENT
Start: 2018-04-05 | End: 2018-04-12 | Stop reason: HOSPADM

## 2018-04-05 RX ORDER — GLYCOPYRROLATE 0.2 MG/ML
INJECTION INTRAMUSCULAR; INTRAVENOUS AS NEEDED
Status: DISCONTINUED | OUTPATIENT
Start: 2018-04-05 | End: 2018-04-05 | Stop reason: SURG

## 2018-04-05 RX ORDER — PROTAMINE SULFATE 10 MG/ML
50 INJECTION, SOLUTION INTRAVENOUS ONCE
Status: COMPLETED | OUTPATIENT
Start: 2018-04-05 | End: 2018-04-05

## 2018-04-05 RX ORDER — PROPOFOL 10 MG/ML
VIAL (ML) INTRAVENOUS CONTINUOUS PRN
Status: DISCONTINUED | OUTPATIENT
Start: 2018-04-05 | End: 2018-04-05 | Stop reason: SURG

## 2018-04-05 RX ORDER — DOBUTAMINE HYDROCHLORIDE 100 MG/100ML
2-20 INJECTION INTRAVENOUS CONTINUOUS PRN
Status: DISCONTINUED | OUTPATIENT
Start: 2018-04-05 | End: 2018-04-06

## 2018-04-05 RX ORDER — CHLORHEXIDINE GLUCONATE 0.12 MG/ML
15 RINSE ORAL EVERY 12 HOURS SCHEDULED
Status: DISCONTINUED | OUTPATIENT
Start: 2018-04-05 | End: 2018-04-06

## 2018-04-05 RX ORDER — PROTAMINE SULFATE 10 MG/ML
INJECTION, SOLUTION INTRAVENOUS
Status: DISPENSED
Start: 2018-04-05 | End: 2018-04-05

## 2018-04-05 RX ORDER — NITROGLYCERIN 20 MG/100ML
INJECTION INTRAVENOUS CONTINUOUS PRN
Status: DISCONTINUED | OUTPATIENT
Start: 2018-04-05 | End: 2018-04-05 | Stop reason: SURG

## 2018-04-05 RX ORDER — SODIUM CHLORIDE 9 MG/ML
INJECTION, SOLUTION INTRAVENOUS CONTINUOUS PRN
Status: DISCONTINUED | OUTPATIENT
Start: 2018-04-05 | End: 2018-04-05 | Stop reason: SURG

## 2018-04-05 RX ORDER — PHENYLEPHRINE HCL IN 0.9% NACL 0.5 MG/5ML
.5-3 SYRINGE (ML) INTRAVENOUS CONTINUOUS PRN
Status: DISCONTINUED | OUTPATIENT
Start: 2018-04-05 | End: 2018-04-06

## 2018-04-05 RX ORDER — SODIUM CHLORIDE 9 MG/ML
INJECTION, SOLUTION INTRAVENOUS AS NEEDED
Status: DISCONTINUED | OUTPATIENT
Start: 2018-04-05 | End: 2018-04-05 | Stop reason: HOSPADM

## 2018-04-05 RX ORDER — ALBUMIN, HUMAN INJ 5% 5 %
SOLUTION INTRAVENOUS
Status: DISPENSED
Start: 2018-04-05 | End: 2018-04-05

## 2018-04-05 RX ORDER — MEPERIDINE HYDROCHLORIDE 25 MG/ML
25 INJECTION INTRAMUSCULAR; INTRAVENOUS; SUBCUTANEOUS EVERY 4 HOURS PRN
Status: ACTIVE | OUTPATIENT
Start: 2018-04-05 | End: 2018-04-05

## 2018-04-05 RX ORDER — LIOTHYRONINE SODIUM 5 UG/1
5 TABLET ORAL DAILY
Status: DISCONTINUED | OUTPATIENT
Start: 2018-04-06 | End: 2018-04-06

## 2018-04-05 RX ORDER — ACETAMINOPHEN 325 MG/1
650 TABLET ORAL EVERY 4 HOURS PRN
Status: DISCONTINUED | OUTPATIENT
Start: 2018-04-05 | End: 2018-04-05 | Stop reason: HOSPADM

## 2018-04-05 RX ORDER — NITROGLYCERIN 20 MG/100ML
5-200 INJECTION INTRAVENOUS CONTINUOUS PRN
Status: DISCONTINUED | OUTPATIENT
Start: 2018-04-05 | End: 2018-04-06

## 2018-04-05 RX ORDER — AMINOCAPROIC ACID 250 MG/ML
INJECTION, SOLUTION INTRAVENOUS AS NEEDED
Status: DISCONTINUED | OUTPATIENT
Start: 2018-04-05 | End: 2018-04-05 | Stop reason: SURG

## 2018-04-05 RX ORDER — ALBUTEROL SULFATE 2.5 MG/3ML
2.5 SOLUTION RESPIRATORY (INHALATION) EVERY 4 HOURS PRN
Status: DISCONTINUED | OUTPATIENT
Start: 2018-04-05 | End: 2018-04-06

## 2018-04-05 RX ORDER — ETOMIDATE 2 MG/ML
INJECTION INTRAVENOUS AS NEEDED
Status: DISCONTINUED | OUTPATIENT
Start: 2018-04-05 | End: 2018-04-05 | Stop reason: SURG

## 2018-04-05 RX ORDER — POTASSIUM CHLORIDE 1.5 G/1.77G
40 POWDER, FOR SOLUTION ORAL AS NEEDED
Status: DISCONTINUED | OUTPATIENT
Start: 2018-04-05 | End: 2018-04-12 | Stop reason: HOSPADM

## 2018-04-05 RX ORDER — MIDAZOLAM HYDROCHLORIDE 1 MG/ML
INJECTION INTRAMUSCULAR; INTRAVENOUS AS NEEDED
Status: DISCONTINUED | OUTPATIENT
Start: 2018-04-05 | End: 2018-04-05 | Stop reason: SURG

## 2018-04-05 RX ORDER — SODIUM CHLORIDE, SODIUM LACTATE, POTASSIUM CHLORIDE, CALCIUM CHLORIDE 600; 310; 30; 20 MG/100ML; MG/100ML; MG/100ML; MG/100ML
9 INJECTION, SOLUTION INTRAVENOUS CONTINUOUS
Status: DISCONTINUED | OUTPATIENT
Start: 2018-04-05 | End: 2018-04-05 | Stop reason: HOSPADM

## 2018-04-05 RX ORDER — ONDANSETRON 2 MG/ML
4 INJECTION INTRAMUSCULAR; INTRAVENOUS EVERY 6 HOURS PRN
Status: DISCONTINUED | OUTPATIENT
Start: 2018-04-05 | End: 2018-04-12 | Stop reason: HOSPADM

## 2018-04-05 RX ORDER — PAPAVERINE HYDROCHLORIDE 30 MG/ML
INJECTION INTRAMUSCULAR; INTRAVENOUS AS NEEDED
Status: DISCONTINUED | OUTPATIENT
Start: 2018-04-05 | End: 2018-04-05 | Stop reason: HOSPADM

## 2018-04-05 RX ORDER — METOPROLOL TARTRATE 5 MG/5ML
2.5 INJECTION INTRAVENOUS EVERY 6 HOURS SCHEDULED
Status: DISCONTINUED | OUTPATIENT
Start: 2018-04-05 | End: 2018-04-06

## 2018-04-05 RX ORDER — MORPHINE SULFATE 2 MG/ML
2 INJECTION, SOLUTION INTRAMUSCULAR; INTRAVENOUS
Status: DISCONTINUED | OUTPATIENT
Start: 2018-04-05 | End: 2018-04-12 | Stop reason: HOSPADM

## 2018-04-05 RX ORDER — POTASSIUM CHLORIDE 750 MG/1
40 CAPSULE, EXTENDED RELEASE ORAL AS NEEDED
Status: DISCONTINUED | OUTPATIENT
Start: 2018-04-05 | End: 2018-04-12 | Stop reason: HOSPADM

## 2018-04-05 RX ORDER — SODIUM CHLORIDE 0.9 % (FLUSH) 0.9 %
30 SYRINGE (ML) INJECTION ONCE AS NEEDED
Status: DISCONTINUED | OUTPATIENT
Start: 2018-04-05 | End: 2018-04-06

## 2018-04-05 RX ADMIN — POTASSIUM CHLORIDE AND DEXTROSE MONOHYDRATE 30 ML/HR: 150; 5 INJECTION, SOLUTION INTRAVENOUS at 11:34

## 2018-04-05 RX ADMIN — PROTAMINE SULFATE 50 MG: 10 INJECTION, SOLUTION INTRAVENOUS at 10:15

## 2018-04-05 RX ADMIN — EPHEDRINE SULFATE 5 MG: 50 INJECTION INTRAMUSCULAR; INTRAVENOUS; SUBCUTANEOUS at 07:41

## 2018-04-05 RX ADMIN — EPHEDRINE SULFATE 5 MG: 50 INJECTION INTRAMUSCULAR; INTRAVENOUS; SUBCUTANEOUS at 09:33

## 2018-04-05 RX ADMIN — EPHEDRINE SULFATE 5 MG: 50 INJECTION INTRAMUSCULAR; INTRAVENOUS; SUBCUTANEOUS at 08:31

## 2018-04-05 RX ADMIN — PROPOFOL 50 MCG/KG/MIN: 10 INJECTION, EMULSION INTRAVENOUS at 10:41

## 2018-04-05 RX ADMIN — ASPIRIN 325 MG ORAL TABLET 325 MG: 325 PILL ORAL at 15:18

## 2018-04-05 RX ADMIN — AMINOCAPROIC ACID 10 G: 250 INJECTION, SOLUTION INTRAVENOUS at 07:32

## 2018-04-05 RX ADMIN — ATORVASTATIN CALCIUM 40 MG: 40 TABLET, FILM COATED ORAL at 20:55

## 2018-04-05 RX ADMIN — CHLORHEXIDINE GLUCONATE 15 ML: 1.2 RINSE ORAL at 20:55

## 2018-04-05 RX ADMIN — EPHEDRINE SULFATE 5 MG: 50 INJECTION INTRAMUSCULAR; INTRAVENOUS; SUBCUTANEOUS at 09:26

## 2018-04-05 RX ADMIN — CALCIUM CHLORIDE 1 G: 100 INJECTION INTRAVENOUS; INTRAVENTRICULAR at 09:42

## 2018-04-05 RX ADMIN — MIDAZOLAM HYDROCHLORIDE 1 MG: 1 INJECTION, SOLUTION INTRAMUSCULAR; INTRAVENOUS at 08:20

## 2018-04-05 RX ADMIN — CHLORHEXIDINE GLUCONATE 15 ML: 1.2 RINSE ORAL at 06:27

## 2018-04-05 RX ADMIN — MORPHINE SULFATE 2 MG: 2 INJECTION, SOLUTION INTRAMUSCULAR; INTRAVENOUS at 18:40

## 2018-04-05 RX ADMIN — SODIUM CHLORIDE: 9 INJECTION, SOLUTION INTRAVENOUS at 07:20

## 2018-04-05 RX ADMIN — SUFENTANIL CITRATE 150 MCG: 50 INJECTION, SOLUTION EPIDURAL; INTRAVENOUS at 07:43

## 2018-04-05 RX ADMIN — NITROGLYCERIN 0.05 MCG/KG/MIN: 20 INJECTION INTRAVENOUS at 07:53

## 2018-04-05 RX ADMIN — ETOMIDATE 30 MG: 2 INJECTION, SOLUTION INTRAVENOUS at 07:09

## 2018-04-05 RX ADMIN — SUFENTANIL CITRATE 50 MCG: 50 INJECTION, SOLUTION EPIDURAL; INTRAVENOUS at 07:09

## 2018-04-05 RX ADMIN — SODIUM CHLORIDE, POTASSIUM CHLORIDE, SODIUM LACTATE AND CALCIUM CHLORIDE 9 ML/HR: 600; 310; 30; 20 INJECTION, SOLUTION INTRAVENOUS at 06:05

## 2018-04-05 RX ADMIN — AMIODARONE HYDROCHLORIDE 0.5 MG/MIN: 1.8 INJECTION, SOLUTION INTRAVENOUS at 12:15

## 2018-04-05 RX ADMIN — PROTAMINE SULFATE 50 MG: 10 INJECTION, SOLUTION INTRAVENOUS at 11:33

## 2018-04-05 RX ADMIN — VECURONIUM BROMIDE 10 MG: 1 INJECTION, POWDER, LYOPHILIZED, FOR SOLUTION INTRAVENOUS at 08:20

## 2018-04-05 RX ADMIN — PROPOFOL 50 MCG/KG/MIN: 10 INJECTION, EMULSION INTRAVENOUS at 12:27

## 2018-04-05 RX ADMIN — ACETAMINOPHEN 650 MG: 325 TABLET ORAL at 21:06

## 2018-04-05 RX ADMIN — LEVOTHYROXINE SODIUM 56 MCG: 112 TABLET ORAL at 15:17

## 2018-04-05 RX ADMIN — PROTAMINE SULFATE 400 MG: 10 INJECTION, SOLUTION INTRAVENOUS at 09:42

## 2018-04-05 RX ADMIN — GLYCOPYRROLATE 0.2 MG: 0.2 INJECTION, SOLUTION INTRAMUSCULAR; INTRAVENOUS at 09:27

## 2018-04-05 RX ADMIN — FAMOTIDINE 20 MG: 20 TABLET, FILM COATED ORAL at 06:26

## 2018-04-05 RX ADMIN — PROPOFOL 25 MCG/KG/MIN: 10 INJECTION, EMULSION INTRAVENOUS at 10:11

## 2018-04-05 RX ADMIN — HEPARIN SODIUM 35000 UNITS: 1000 INJECTION, SOLUTION INTRAVENOUS; SUBCUTANEOUS at 08:18

## 2018-04-05 RX ADMIN — LIDOCAINE HYDROCHLORIDE 0.2 ML: 10 INJECTION, SOLUTION EPIDURAL; INFILTRATION; INTRACAUDAL; PERINEURAL at 06:05

## 2018-04-05 RX ADMIN — LIDOCAINE HYDROCHLORIDE 70 MG: 20 INJECTION, SOLUTION INFILTRATION; PERINEURAL at 07:09

## 2018-04-05 RX ADMIN — MUPIROCIN 1 APPLICATION: 20 OINTMENT TOPICAL at 06:29

## 2018-04-05 RX ADMIN — AMIODARONE HYDROCHLORIDE 300 MG: 50 INJECTION, SOLUTION INTRAVENOUS at 09:25

## 2018-04-05 RX ADMIN — GLYCOPYRROLATE 0.4 MG: 0.2 INJECTION, SOLUTION INTRAMUSCULAR; INTRAVENOUS at 09:31

## 2018-04-05 RX ADMIN — ALBUMIN HUMAN 500 ML: 0.05 INJECTION, SOLUTION INTRAVENOUS at 12:28

## 2018-04-05 RX ADMIN — DOBUTAMINE HYDROCHLORIDE 3 MCG/KG/MIN: 100 INJECTION INTRAVENOUS at 15:18

## 2018-04-05 RX ADMIN — PHENYLEPHRINE HYDROCHLORIDE 80 MCG: 10 INJECTION INTRAVENOUS at 09:29

## 2018-04-05 RX ADMIN — SUFENTANIL CITRATE 50 MCG: 50 INJECTION, SOLUTION EPIDURAL; INTRAVENOUS at 09:40

## 2018-04-05 RX ADMIN — AMINOCAPROIC ACID 10 G: 250 INJECTION, SOLUTION INTRAVENOUS at 09:43

## 2018-04-05 RX ADMIN — ROCURONIUM BROMIDE 100 MG: 10 SOLUTION INTRAVENOUS at 07:09

## 2018-04-05 RX ADMIN — MIDAZOLAM HYDROCHLORIDE 2 MG: 1 INJECTION, SOLUTION INTRAMUSCULAR; INTRAVENOUS at 09:40

## 2018-04-05 RX ADMIN — CEFAZOLIN SODIUM 2 G: 2 INJECTION, SOLUTION INTRAVENOUS at 09:33

## 2018-04-05 RX ADMIN — ALBUMIN HUMAN 500 ML: 0.05 INJECTION, SOLUTION INTRAVENOUS at 11:34

## 2018-04-05 RX ADMIN — GLYCOPYRROLATE 0.2 MG: 0.2 INJECTION, SOLUTION INTRAMUSCULAR; INTRAVENOUS at 07:51

## 2018-04-05 RX ADMIN — ONDANSETRON 4 MG: 2 INJECTION INTRAMUSCULAR; INTRAVENOUS at 18:56

## 2018-04-05 RX ADMIN — FENTANYL CITRATE 25 MCG: 50 INJECTION, SOLUTION INTRAMUSCULAR; INTRAVENOUS at 17:14

## 2018-04-05 RX ADMIN — SODIUM CHLORIDE, POTASSIUM CHLORIDE, SODIUM LACTATE AND CALCIUM CHLORIDE: 600; 310; 30; 20 INJECTION, SOLUTION INTRAVENOUS at 07:04

## 2018-04-05 RX ADMIN — FENTANYL CITRATE 25 MCG: 50 INJECTION, SOLUTION INTRAMUSCULAR; INTRAVENOUS at 17:49

## 2018-04-05 RX ADMIN — MIDAZOLAM HYDROCHLORIDE 2 MG: 1 INJECTION, SOLUTION INTRAMUSCULAR; INTRAVENOUS at 07:06

## 2018-04-05 RX ADMIN — CEFAZOLIN SODIUM 2 G: 2 INJECTION, SOLUTION INTRAVENOUS at 07:22

## 2018-04-05 RX ADMIN — WATER 1.5 G: 1 INJECTION INTRAMUSCULAR; INTRAVENOUS; SUBCUTANEOUS at 16:47

## 2018-04-05 RX ADMIN — FENTANYL CITRATE 25 MCG: 50 INJECTION, SOLUTION INTRAMUSCULAR; INTRAVENOUS at 16:45

## 2018-04-05 RX ADMIN — OXYCODONE HYDROCHLORIDE AND ACETAMINOPHEN 2 TABLET: 5; 325 TABLET ORAL at 18:56

## 2018-04-05 NOTE — ANESTHESIA PREPROCEDURE EVALUATION
Anesthesia Evaluation     Patient summary reviewed and Nursing notes reviewed   no history of anesthetic complications:  NPO Solid Status: > 8 hours  NPO Liquid Status: > 8 hours           Airway   Mallampati: II  TM distance: >3 FB  Neck ROM: full  No difficulty expected  Dental    (+) lower dentures and upper dentures    Pulmonary     breath sounds clear to auscultation  (+) sleep apnea,   Cardiovascular   Exercise tolerance: poor (<4 METS)    ECG reviewed  Beta blocker given within 24 hours of surgery  Rhythm: regular  Rate: normal    (+) hypertension, CAD, dysrhythmias (RBBB), angina, PVD, hyperlipidemia,       Neuro/Psych  (+) CVA (no residuals),     GI/Hepatic/Renal/Endo    (+) obesity,  GERD well controlled,  hypothyroidism,   (-) hepatitis, liver disease, no renal disease, diabetes    ROS Comment: Occasional dysphagia    Musculoskeletal     Abdominal   (+) obese,    Substance History      OB/GYN          Other   (+) arthritis   history of cancer (skin)                  Anesthesia Plan    ASA 4     general   (Risk/Benefits discussed with patient as well as ICU post op ventilation.  Placement of a-line, possible YELENA, and PAC also discussed along with risks/benefits   labs/studies reviewed)  intravenous induction   Anesthetic plan and risks discussed with patient.    Plan discussed with CRNA.

## 2018-04-05 NOTE — ANESTHESIA PROCEDURE NOTES
Central Line    Patient location during procedure: OR  Start time: 4/5/2018 7:20 AM  Indications: central pressure monitoring, vascular access and MD/Surgeon request  Staff  Anesthesiologist: HARMONY NGUYEN  Preanesthetic Checklist  Completed: patient identified, site marked, surgical consent, pre-op evaluation, timeout performed, IV checked, risks and benefits discussed and monitors and equipment checked  Central Line Prep  Sterile Tech:cap, gloves, gown, mask and sterile barriers  Prep: chloraprep  Patient monitoring: blood pressure monitoring, continuous pulse oximetry and EKG  Central Line Procedure  Laterality:right  Location:internal jugular  Catheter Type:Cordis and [place]  Catheter Size:9 Fr  Guidance:landmark technique and palpation technique  Assessment  Post procedure:biopatch applied, line sutured, occlusive dressing applied and secured with tape  Assessement:blood return through all ports, free fluid flow, Bijan Test and chest x-ray ordered  Complications:no  Patient Tolerance:patient tolerated the procedure well with no apparent complications

## 2018-04-05 NOTE — OP NOTE
Operative Report    Preop Diagnosis: Coronary artery disease.  Platelet dysfunction by P2 Y 12 testing despite withholding of Plavix.  Peripheral arterial vascular disease.  Elevated hemoglobin A1c.        Procedure: Coronary artery bypass grafting ×2 with endoscopic harvesting of the left great saphenous vein.  Left internal mammary artery to left anterior descending saphenous vein graft to the obtuse marginal branch the circumflex        Surgeons: Rakesh Mittal M.D. and Yajaira Rothman PA-C        Indication: Patient referred to me with the above listed medical problems he was seen in the office earlier this week and then again today before surgery both times he was apprised of the risk of stroke bleeding infection death renal failure and no guarantees were made as to outcome he agreed to proceed with surgery.     Description; Supine position sterile prep and drape and left great saphenous vein harvested endoscopically median sternotomy performed.  Perioperative antibiotics had been given.  The patient fully heparinized cannulas the ascending aorta and right atrial appendage and patient begun on cardiopulmonary bypass.  Antegrade cardioplegia was given after aortic cross-clamp application.  The right coronary system was extremely small and nongraftable as I expected.  The obtuse marginal vessel was a large good quality vessel 2-1/2 mm and the first saphenous vein graft was sutured to this without difficulty.  We then turned our attention to the left anterior descending coronary unfortunately this vessel had a stent quite distally placed.  We placed the left internal mammary artery distal to the stent however this was near the apex of the heart minimal outflow.  This vessel in this region was only 1-1/2 mm in diameter and had a thickened wall.  The one proximal vein graft sutured to the ascending aorta patient weaned from bypass without the need for pressor agents initially paced and subsequent sinus rhythm no red  cells were transfused.  Some platelets were transfused secondary to abnormal P2 Y 12 level despite withholding Plavix.  Good hemostasis was obtained sternum closed with wire fashion skin with suture sponge and needle count reported as correct.  Total cross-clamp time 42 minutes total cardiopulmonary bypass time 54 minutes.        Description: The patient was fully heparinized cannulas placed in the ascending aorta      Please note that portions of this note were completed with a voice recognition program. Efforts were made to edit the dictations, but occasionally words are mistranscribed.

## 2018-04-05 NOTE — H&P (VIEW-ONLY)
04/02/2018  Patient Information  Kavon Waters Jr.                                                                                          [address] [facility] [address]   1950  'PCP/Referring Physician'  Mina Story MD  [phone]  No ref. provider found    Chief Complaint   Patient presents with   • Consult     NP Per Dr. Ahn for CAD       History of Present Illness:  The patient was referred to me to evaluate for coronary bypass grafting surgery.  He has had multiple previous coronary artery stents in the past and has had some cardiac arrhythmias.  He has also had some recent shortness of breath with minimal activity and some very mild substernal pain radiating to the base of the neck.  At first he thought he may be just having indigestion and not cardiac pain.  He was referred to Dr. Ahn for evaluation and catheterization demonstrated coronary disease with significant in-stent stenosis.  The patient was on Plavix.  He has stopped his Plavix at this time and is seeing me in the office to discuss surgery.  He is pain-free in the office at this time and breathing unlabored. He states that he wakes at night 3 times or 4 times with cramping in his calf muscles and has attributed this to his low sodium levels.  However, I'm suspicious that there may be some underlying vascular disease in the superficial femoral arteries.      Patient Active Problem List   Diagnosis   • Unstable angina   • Angina at rest   • Coronary artery disease involving native coronary artery of native heart with angina pectoris   • Peripheral arterial disease     Past Medical History:   Diagnosis Date   • Arrhythmia    • Arthritis    • Borderline diabetes    • Cancer     PROSTATE   • Coronary artery disease    • Disease of thyroid gland    • GERD (gastroesophageal reflux disease)    • Hyperlipidemia    • Hypertension    • Low sodium levels    • Skin cancer    • Stroke      Past Surgical History:   Procedure  Laterality Date   • CARDIAC CATHETERIZATION     • CHOLECYSTECTOMY     • CORONARY STENT PLACEMENT     • SKIN CANCER EXCISION         Current Outpatient Prescriptions:   •  aspirin 81 MG chewable tablet, Chew 81 mg Daily., Disp: , Rfl:   •  clopidogrel (PLAVIX) 75 MG tablet, Take 75 mg by mouth Daily., Disp: , Rfl:   •  fluticasone (FLONASE) 50 MCG/ACT nasal spray, 1 spray into each nostril Daily., Disp: , Rfl:   •  levothyroxine (SYNTHROID, LEVOTHROID) 50 MCG tablet, Take 55 mcg by mouth Daily., Disp: , Rfl:   •  lubiprostone (AMITIZA) 24 MCG capsule, Take 24 mcg by mouth Daily With Breakfast., Disp: , Rfl:   •  montelukast (SINGULAIR) 10 MG tablet, Take 10 mg by mouth Every Night., Disp: , Rfl:   •  nebivolol (BYSTOLIC) 2.5 MG tablet, Take 2.5 mg by mouth Daily., Disp: , Rfl:   •  Omega-3-6-9 capsule, Take 1 tablet by mouth 2 (Two) Times a Day., Disp: , Rfl:   •  raNITIdine (ZANTAC) 150 MG tablet, Take 300 mg by mouth Every Night., Disp: , Rfl:   •  sodium chloride 1 g tablet, Take 1 g by mouth 2 (Two) Times a Day., Disp: , Rfl:   •  valsartan (DIOVAN) 160 MG tablet, Take 320 mg by mouth Every Night., Disp: , Rfl:   •  fexofenadine (ALLEGRA) 180 MG tablet, Take 180 mg by mouth 2 (Two) Times a Day., Disp: , Rfl:   No Known Allergies  Social History     Social History   • Marital status:      Spouse name: N/A   • Number of children: 2   • Years of education: N/A     Occupational History   • 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     Social History Main Topics   • Smoking status: Former Smoker     Packs/day: 3.00     Years: 15.00     Types: Cigarettes     Quit date: 4/2/1988   • Smokeless tobacco: Never Used      Comment: QUIT 30 YEARS AGO   • Alcohol use No   • Drug use: No   • Sexual activity: Defer     Other Topics Concern   • Not on file     Social History Narrative   • No narrative on file     Family History   Problem Relation Age of Onset   • Kidney failure Mother    • Hypertension Mother    •  "Coronary artery disease Mother    • Coronary artery disease Father    • Cancer Father      Review of Systems   Constitution: Positive for malaise/fatigue. Negative for chills, fever, night sweats and weight loss.   HENT: Negative for hearing loss, odynophagia and sore throat.    Cardiovascular: Positive for chest pain, claudication and dyspnea on exertion. Negative for leg swelling, orthopnea and palpitations.   Respiratory: Positive for cough and wheezing. Negative for hemoptysis.    Endocrine: Negative for cold intolerance, heat intolerance, polydipsia, polyphagia and polyuria.   Hematologic/Lymphatic: Bruises/bleeds easily.   Skin: Positive for rash (facial area and groin area). Negative for itching.   Musculoskeletal: Positive for arthritis and back pain. Negative for joint pain, joint swelling and myalgias.   Gastrointestinal: Positive for constipation. Negative for abdominal pain, diarrhea, hematemesis, hematochezia, melena, nausea and vomiting.   Genitourinary: Negative for dysuria, frequency and hematuria.   Neurological: Positive for light-headedness. Negative for focal weakness, headaches, numbness and seizures.   Psychiatric/Behavioral: Negative for suicidal ideas.   All other systems reviewed and are negative.    Vitals:    04/02/18 0742   BP: 160/70   BP Location: Right arm   Patient Position: Sitting   Pulse: 51   Temp: 97.6 °F (36.4 °C)   SpO2: 98%   Weight: 101 kg (222 lb)   Height: 182.9 cm (72\")      Physical Exam   CONSTITUTIONAL: Alert and conversant, Well dressed, Well nourished, No acute distress  EYES: Sclera clean, Anicteric, Pupils equal  ENT: No nasal deviation, Trachea midline  NECK: No neck masses, Supple  LUNGS: No wheezing, Cough, non-congested  HEART: No rubs, No murmurs  ABDOMEN: Soft, non-distended, No masses, Non tender to palpation  NEURO: No motor deficits, No sensory deficits, Cranial Nerves 2 through 12 grossly intact  PSYCHIATRIC: Oriented to person, place and time, No memory " deficits, Mood appropriate  VASCULAR:  The feet are warm and viable, but I am unable to palpate a distinct posterior tibial or dorsalis pedis pulse.    Labs/Imaging:   I reviewed the cardiac catheterization demonstrating a stent within the left anterior descending coronary and critical disease as well as disease in the right coronary and circumflex system.    Assessment/Plan:  CHRONIC OBSTRUCTIVE PULMONARY DISEASE: This patient has known emphysema and/or a smoking history.  We will obtain and interpret pulmonary function tests and assess arterial blood gas measurements.  If the chronic obstructive pulmonary disease is severe, I will coordinate management with a pulmonary specialist, either preoperatively or in the immediate postoperative period.  Bronchodilator therapy will be needed and smoking cessation, if applicable, will be instituted. The underlying COPD will add complexity to any planned surgical intervention.    DIABETES: I will manage the patient's blood sugars closely, both intraoperatively and postoperatively.  This patient may require a continuous insulin infusion to maintain strict serum glucose control.  I will coordinate the patient's glucose management with the hospital endocrinologist or hospitalist service if the management becomes problematic. The patient is aware that diabetes can complicate their postoperative course and contribute to infection or wound- healing issues.    The patient has had a previous stroke.    Operable significant peripheral vascular disease.  Patient has cramping in the calf muscles, which I do not believe is related to his sodium level.  We are obtaining a CT angiogram of the aorta with complete runoff and 3-dimensional reconstruction today.    He is agreeable to coronary bypass grafting surgery.  He is aware that the surgery has with it a risk of stroke, bleeding, infection, death and renal failure.  No guarantees have been made as to outcome and he agrees to proceed.           Patient Active Problem List   Diagnosis   • Unstable angina   • Angina at rest   • Coronary artery disease involving native coronary artery of native heart with angina pectoris   • Peripheral arterial disease     Signed by: Rakesh Mittal M.D.    4/2/2018    CC:  MD Reny Flores, , editing for Rakesh Mittal M.D.    I, Rakesh Mittal MD, have read and agree with the editing done by Rney Valle, .

## 2018-04-05 NOTE — PLAN OF CARE
Problem: Patient Care Overview  Goal: Plan of Care Review  Outcome: Ongoing (interventions implemented as appropriate)   04/05/18 1818   Coping/Psychosocial   Plan of Care Reviewed With patient   Plan of Care Review   Progress improving   OTHER   Outcome Summary cabg times 2. out on diprivan and amio. bifascicular block and 1st degree block. jeannette updated. decrease orders noted and amio to .5mg. levo and ntg used for bp. weaned off vent without problem. low c.i. after volume so dobutamine started. pt having a lot of pain after extub.. discussed toradol with ortiz but creatinine up. r fem laney dced without problem. no bleeding or hematomac      04/05/18 1818   Coping/Psychosocial   Plan of Care Reviewed With patient   Plan of Care Review   Progress improving   OTHER   Outcome Summary cabg times 2. out on diprivan and amio. bifascicular block and 1st degree block. jeannette updated. decrease orders noted and amio to .5mg. levo and ntg used for bp. weaned off vent without problem. low c.i. after volume so dobutamine started. pt having a lot of pain after extub.. discussed toradol with ortiz but creatinine up. r fem lnaey dced without problem. no bleeding or hematomac       Problem: Skin Injury Risk (Adult)  Goal: Identify Related Risk Factors and Signs and Symptoms  Outcome: Ongoing (interventions implemented as appropriate)   04/05/18 1818   Skin Injury Risk (Adult)   Related Risk Factors (Skin Injury Risk) critical care admission;fluid intake inadequate;hypothermia/hyperthermia;hospitalization prolonged;mobility impaired     Goal: Skin Health and Integrity  Outcome: Ongoing (interventions implemented as appropriate)   04/05/18 1818   Skin Injury Risk (Adult)   Skin Health and Integrity making progress toward outcome       Problem: Cardiac Surgery (Adult)  Goal: Signs and Symptoms of Listed Potential Problems Will be Absent, Minimized or Managed (Cardiac Surgery)  Outcome: Ongoing (interventions implemented as  appropriate)   04/05/18 1818   Goal/Outcome Evaluation   Problems Assessed (Cardiac Surgery) all   Problems Present (Cardiac Surgery) hemodynamic instability;pain     Goal: Anesthesia/Sedation Recovery  Outcome: Ongoing (interventions implemented as appropriate)   04/05/18 1818   Goal/Outcome Evaluation   Anesthesia/Sedation Recovery progressing toward baseline       Problem: Ventilation, Mechanical Invasive (Adult)  Goal: Signs and Symptoms of Listed Potential Problems Will be Absent, Minimized or Managed (Ventilation, Mechanical Invasive)  Outcome: Ongoing (interventions implemented as appropriate)   04/05/18 1818   Goal/Outcome Evaluation   Problems Assessed (Mechanical Ventilation, Invasive) all   Problems Present (Mech Vent, Invasive) immobility;situational response      04/05/18 1818   Goal/Outcome Evaluation   Problems Assessed (Mechanical Ventilation, Invasive) all   Problems Present (Mech Vent, Invasive) immobility;situational response       Problem: Pain, Acute (Adult)  Goal: Identify Related Risk Factors and Signs and Symptoms  Outcome: Ongoing (interventions implemented as appropriate)   04/05/18 1818   Pain, Acute (Adult)   Related Risk Factors (Acute Pain) knowledge deficit;patient perception;persistent pain;positioning;procedure/treatment;surgery   Signs and Symptoms (Acute Pain) altered time perception;nausea/vomiting/anorexia;verbalization of pain descriptors     Goal: Acceptable Pain Control/Comfort Level  Outcome: Ongoing (interventions implemented as appropriate)   04/05/18 1818   Pain, Acute (Adult)   Acceptable Pain Control/Comfort Level making progress toward outcome

## 2018-04-05 NOTE — PROGRESS NOTES
"Intensivist Note     4/5/2018  Hospital Day: 0  Day of Surgery      Mr. Kavon Waters Jr., 67 y.o. male is followed for:  Principal Problem:    Three-vessel Coronary artery disease with 99% occluded stents to LAD, circumflex, RCA  Active Problems:    Unstable angina    S/P CABG x 2. 4/5/18    Hypertension    Hyperlipidemia    History of left corona radiata CVA 2015. No residual and no occlusive carotid artery disease    GERD (gastroesophageal reflux disease)    History of prostate Cancer s/p radiation therapy       SUBJECTIVE     Subjective    67-year-old white male remote heavy smoker with known CAD and a history of 7 stents previously placed. Additional risk factors of CAD includes smoking, hypertension, hyperlipidemia.    Patient presented with 4 to 5 month history of chest pain that worsened with exertion and was relieved with rest. Underwent cardiac catheterization by Dr. Ahn 3/29/18 revealing severe three-vessel coronary disease with occluded stents to the LAD, circumflex, RCA. LV function was preserved.     Patient is now s/p CABG ×2 and returns to the ICU on ventilatory support. Blood pressure is up so is on nitroglycerin. He is sedated on propofol and receiving amiodarone.    The patient's relevant past medical, surgical and social history were reviewed and updated in Epic as appropriate.    OBJECTIVE     /69   Pulse 54   Temp 98.6 °F (37 °C)   Resp 12   Ht 182.9 cm (72\")   Wt 100 kg (221 lb)   SpO2 100%   BMI 29.97 kg/m²   Oxygen Concentration (%): 100       Flowsheet Rows    Flowsheet Row First Filed Value   Admission Height 182.9 cm (72\") Documented at 04/05/2018 0618   Admission Weight 100 kg (221 lb) Documented at 04/05/2018 0618        Intake & Output (last day)       04/04 0701 - 04/05 0700 04/05 0701 - 04/06 0700    I.V. (mL/kg)  850 (8.5)    Blood  569    Other  300    Total Intake(mL/kg)  1719 (17.2)    Urine (mL/kg/hr)  2840 (3.1)    Chest Tube  350 (0.4)    Total Output   3190 "    Net   -1471                Objective    Exam:  General Exam:  Older white male intubated and on ventilatory support   HEENT: Pupils equal and reactive.ETT and OG tube in place.   Neck:                          Supple, no JVD, thyromegaly, or adenopathy  Lungs: Clear to auscultation and percussion anteriorly and posteriorly.  Chest:                         4 MT/CT tubes in place.   Cardiovascular: Regular rate and rhythm without murmurs or gallops.Soft pericardial rub   Abdomen: Soft nontender without organomegaly or masses.   and rectal:Roa catheter in place.   Extremities: No cyanosis clubbing edema.Right radial art line in position.   Neurologic:                Unable to evaluate due to anesthesia/sedation     Chest X-Ray 4/5/18.: Heart size is normal. ET tube in position. Right IJ [place] in position. MT/CT tubes in position. Lungs clear except for some minimal linear platelike lingular atelectasis.    INFUSIONS    amiodarone 0.5 mg/min Last Rate: 0.5 mg/min (04/05/18 1215)   DOBUTamine 2-20 mcg/kg/min Last Rate: 3 mcg/kg/min (04/05/18 1518)   insulin regular infusion 1 unit/mL (CCU use) 0-50 Units/hr Last Rate: 2.1 Units/hr (04/05/18 1500)   nitroglycerin 5-200 mcg/min Last Rate: 1 mcg/min (04/05/18 1400)   norepinephrine 0.02-0.3 mcg/kg/min Last Rate: Stopped (04/05/18 1518)   propofol 5-50 mcg/kg/min Last Rate: Stopped (04/05/18 1518)         Results from last 7 days  Lab Units 04/05/18  1435 04/05/18  1058 04/05/18  0948  04/02/18  0957   WBC 10*3/mm3 9.16 9.39  --   --  6.39   HEMOGLOBIN g/dL 9.9* 9.8*  --   --  13.6   HEMOGLOBIN, POC g/dL  --   --  8.8*  < >  --    HEMATOCRIT % 28.7* 27.8*  --   --  39.4   HEMATOCRIT POC %  --   --  26*  < >  --    PLATELETS 10*3/mm3 183 167  --   --  207   < > = values in this interval not displayed.    Results from last 7 days  Lab Units 04/05/18  1435 04/05/18  1058   SODIUM mmol/L 131* 130*   POTASSIUM mmol/L 3.9 4.7   CHLORIDE mmol/L 95* 96*   CO2 mmol/L 27.0 25.0    BUN mg/dL 18 16   CREATININE mg/dL 1.50* 1.30   GLUCOSE mg/dL 122* 136*   CALCIUM mg/dL 9.5 10.3       Results from last 7 days  Lab Units 04/05/18  1435 04/05/18  1058 04/02/18  0956   MAGNESIUM mg/dL 2.4 2.8* 1.8   PHOSPHORUS mg/dL 4.1 3.4  --        Results from last 7 days  Lab Units 04/02/18  0956   ALK PHOS U/L 75   BILIRUBIN mg/dL 0.6   ALT (SGPT) U/L 34   AST (SGOT) U/L 26       No results found for: SEDRATE  No results found for: BNP  No results found for: CKTOTAL, CKMB, CKMBINDEX, TROPONINI, TROPONINT  No results found for: TSH  No results found for: LACTATE  No results found for: CORTISOL      Results from last 7 days  Lab Units 04/05/18  1605 04/05/18  1126 04/05/18  0948 04/05/18  0915 04/05/18  0840   PH, ARTERIAL pH units 7.369 7.362 7.35 7.34* 7.29*   PCO2, ARTERIAL mm Hg 44.6 44.2  --   --   --    PO2 ART mm Hg 110.0* 333.0*  --   --   --    HCO3 ART mmol/L 25.6 25.1  --   --   --    FIO2 % 35 100  --   --   --        Vent Settings:  Vt (Set, L): 0.6 L  Resp Rate (Set): 6  Pressure Support (cm H2O): 10 cm H20  FiO2 (%): 35 %  PEEP/CPAP (cm H2O): 5 cm H20    Minute Ventilation (L/min) (Obs): 19.3 L/min  Resp Rate (Observed) Vent: 26  I:E Ratio (Obs): 1:1.70  PIP Observed (cm H2O): 16 cm H2O    I reviewed the patient's results, images and medication.    Assessment/Plan   ASSESSMENT      Principal Problem:    Three-vessel Coronary artery disease with 99% occluded stents to LAD, circumflex, RCA  Active Problems:    Unstable angina    S/P CABG x 2. 4/5/18    Hypertension    Hyperlipidemia    History of left corona radiata CVA 2015. No residual and no occlusive carotid artery disease    GERD (gastroesophageal reflux disease)    History of prostate Cancer s/p radiation therapy      DISCUSSION: Doing reasonably well postop. Although he was a smoker was no evidence of obstructive lung disease as preoperative PFTs. Should wean easily     PLAN     1. Standard wean from ventilator when alert  2. Ulcer and DVT  prophylaxis  3.  PRN bronchodilators    4. Taper off insulin drip per protocol. Is not diabetic and hemoglobin A1c was 5.8      Plan of care and goals reviewed with mulitdisciplinary team at daily rounds.    I discussed the patient's findings and my recommendations with patient and nursing staff    Time spent Critical care 25 min (It does not include procedure time).    Rosendo Lopez MD  Intensive Care Medicine  04/05/18 4:10 PM

## 2018-04-05 NOTE — ANESTHESIA POSTPROCEDURE EVALUATION
Patient: Kavon Waters Jr.    Procedure Summary     Date:  04/05/18 Room / Location:   [facility] [address] [place] [address] [facility]    Anesthesia Start:  0704 Anesthesia Stop:  1040    Procedure:  mediasternotomyCORONARY ARTERY BYPASS graft x2 utilizing left INTERNAL MAMMARY ARTERY , Endoscopic vein harvesting left greater saphenous vein (N/A Chest) Diagnosis:       Unstable angina      Angina at rest      (Unstable angina [I20.0])      (Angina at rest [I20.8])    Surgeon:  Rakesh Mittal MD Provider:  Mee Gomez MD    Anesthesia Type:  general ASA Status:  4          Anesthesia Type: general  Last vitals  BP   152/78 (04/05/18 0619)   Temp   96.6 °F (35.9 °C) (04/05/18 0618)   Pulse   54 (04/05/18 0618)   Resp   18 (04/05/18 0618)     SpO2   97 % (04/05/18 0618)     Post Anesthesia Care and Evaluation    Patient location during evaluation: ICU  Patient participation: complete - patient cannot participate (intubated and sedated)  Post-procedure mental status: sedated.  Pain score: 0  Pain management: adequate  Airway patency: patent  Anesthetic complications: No anesthetic complications  PONV Status: none  Cardiovascular status: acceptable and hemodynamically stable  Respiratory status: acceptable, ETT, intubated and ventilator  Hydration status: acceptable    Comments: No apparent anesthesia complications noted at this time

## 2018-04-05 NOTE — INTERVAL H&P NOTE
"Pre-Op H&P (See Recent Office Note Attached for Full H&P)    History and physical note from office reviewed and updated with the following, otherwise there are no changes in H&P:      Review of Systems:  General ROS:  no fever, chills, rashes, No change since last office visit  Cardiovascular: Positive for chest pain, claudication and dyspnea on exertion. Negative for leg swelling, orthopnea and palpitations.   Respiratory: Positive for cough and occasional wheezing. Negative for hemoptysis.     Immunization History:   Influenza:  Yes    Pneumococcal:  Yes   Tetanus:  Unknown     Meds:    No current facility-administered medications on file prior to encounter.      Current Outpatient Prescriptions on File Prior to Encounter   Medication Sig Dispense Refill   • aspirin 81 MG chewable tablet Chew 81 mg Daily.     • fexofenadine (ALLEGRA) 180 MG tablet Take 180 mg by mouth 2 (Two) Times a Day.     • levothyroxine (SYNTHROID, LEVOTHROID) 50 MCG tablet Take 55 mcg by mouth Daily.     • lubiprostone (AMITIZA) 24 MCG capsule Take 24 mcg by mouth 2 (Two) Times a Day With Meals.     • montelukast (SINGULAIR) 10 MG tablet Take 10 mg by mouth Every Night.     • nebivolol (BYSTOLIC) 2.5 MG tablet Take 2.5 mg by mouth Daily.     • Omega-3-6-9 capsule Take 1 tablet by mouth 3 (Three) Times a Day.     • raNITIdine (ZANTAC) 150 MG tablet Take 300 mg by mouth Every Night.     • sodium chloride 1 g tablet Take 1 g by mouth 2 (Two) Times a Day.     • valsartan (DIOVAN) 160 MG tablet Take 320 mg by mouth Every Night.     • clopidogrel (PLAVIX) 75 MG tablet Take 75 mg by mouth Daily.     • fluticasone (FLONASE) 50 MCG/ACT nasal spray 1 spray into each nostril Daily.         Vital Signs:  /78 (BP Location: Left arm, Patient Position: Sitting)   Pulse 54   Temp 96.6 °F (35.9 °C) (Temporal Artery )   Resp 18   Ht 182.9 cm (72\")   Wt 100 kg (221 lb)   SpO2 97%   BMI 29.97 kg/m²     Physical Exam:    CV:  S1S2 regular rate and " rhythm, no murmur               Resp:  Clear to auscultation; respirations regular, even and unlabored    Results Review:    I reviewed the patient's new clinical results.    Cancer Staging (if applicable)  Cancer Patient: __ yes __no __unknown; If yes, clinical stage T:__ N:__M:__, stage group or __N/A    Assessment/Plan:   UNSTABLE ANGINA; ANGINA AT REST; CORONARY ARTERY DISEASE / CORONARY ARTERY BYPASS WITH INTERNAL MAMMARY ARTERY GRAFT, EV  This patient was seen in the office previously.  He understands that the surgery has with it a risk of stroke bleeding infection death renal failure and no guarantees were made as to outcome.  This was discussed with him in the office and then again this morning prior to surgery he is very well-informed he wishes to proceed    GINNY Avery  4/5/2018   6:53 AM

## 2018-04-05 NOTE — ANESTHESIA PROCEDURE NOTES
Airway  Date/Time: 4/5/2018 7:10 AM  Airway not difficult    General Information and Staff    Patient location during procedure: OR  Anesthesiologist: HARMONY NGUYEN    Indications and Patient Condition  Indications for airway management: airway protection    Preoxygenated: yes  Mask difficulty assessment: 2 - vent by mask + OA or adjuvant +/- NMBA    Final Airway Details  Final airway type: endotracheal airway      Successful airway: ETT  Cuffed: yes   Successful intubation technique: direct laryngoscopy  Facilitating devices/methods: intubating stylet  Endotracheal tube insertion site: oral  Blade: Tova  Blade size: #3  ETT size: 7.5 mm  Cormack-Lehane Classification: grade I - full view of glottis  Placement verified by: chest auscultation and capnometry   Cuff volume (mL): 8  Measured from: gums  ETT to gums (cm): 22  Number of attempts at approach: 1    Additional Comments  Atraumatic, BBSE, ETCO2, ETT secured

## 2018-04-05 NOTE — ANESTHESIA PROCEDURE NOTES
Arterial Line    Patient location during procedure: pre-op  Start time: 4/5/2018 6:44 AM   Performed By   Anesthesiologist: HARMONY NGUYEN  Preanesthetic Checklist  Completed: patient identified, site marked, surgical consent, pre-op evaluation, timeout performed, IV checked, risks and benefits discussed and monitors and equipment checked  Arterial Line Prep   Sterile Tech: cap, gloves, mask and sterile barriers  Prep: ChloraPrep  Patient monitoring: blood pressure monitoring, continuous pulse oximetry and EKG  Arterial Line Procedure   Laterality:right  Location:  radial artery  Catheter size: 20 G   Guidance: landmark technique and palpation technique  Number of attempts: 1  Successful placement: yes          Post Assessment   Dressing Type: line sutured, occlusive dressing applied, secured with tape and wrist guard applied.   Complications no  Circ/Move/Sens Assessment: normal.   Patient Tolerance: patient tolerated the procedure well with no apparent complications

## 2018-04-06 ENCOUNTER — APPOINTMENT (OUTPATIENT)
Dept: GENERAL RADIOLOGY | Facility: HOSPITAL | Age: 68
End: 2018-04-06

## 2018-04-06 LAB
ABO + RH BLD: NORMAL
ABO + RH BLD: NORMAL
ALBUMIN SERPL-MCNC: 3.8 G/DL (ref 3.2–4.8)
ALBUMIN SERPL-MCNC: 4 G/DL (ref 3.2–4.8)
ANION GAP SERPL CALCULATED.3IONS-SCNC: 8 MMOL/L (ref 3–11)
ANION GAP SERPL CALCULATED.3IONS-SCNC: 9 MMOL/L (ref 3–11)
BASOPHILS # BLD AUTO: 0.03 10*3/MM3 (ref 0–0.2)
BASOPHILS NFR BLD AUTO: 0.3 % (ref 0–1)
BH BB BLOOD EXPIRATION DATE: NORMAL
BH BB BLOOD EXPIRATION DATE: NORMAL
BH BB BLOOD TYPE BARCODE: 6200
BH BB BLOOD TYPE BARCODE: 6200
BH BB DISPENSE STATUS: NORMAL
BH BB DISPENSE STATUS: NORMAL
BH BB PRODUCT CODE: NORMAL
BH BB PRODUCT CODE: NORMAL
BH BB UNIT NUMBER: NORMAL
BH BB UNIT NUMBER: NORMAL
BUN BLD-MCNC: 18 MG/DL (ref 9–23)
BUN BLD-MCNC: 19 MG/DL (ref 9–23)
BUN/CREAT SERPL: 10.6 (ref 7–25)
BUN/CREAT SERPL: 11.2 (ref 7–25)
CALCIUM SPEC-SCNC: 8.9 MG/DL (ref 8.7–10.4)
CALCIUM SPEC-SCNC: 9 MG/DL (ref 8.7–10.4)
CHLORIDE SERPL-SCNC: 101 MMOL/L (ref 99–109)
CHLORIDE SERPL-SCNC: 102 MMOL/L (ref 99–109)
CO2 SERPL-SCNC: 24 MMOL/L (ref 20–31)
CO2 SERPL-SCNC: 26 MMOL/L (ref 20–31)
CREAT BLD-MCNC: 1.7 MG/DL (ref 0.6–1.3)
CREAT BLD-MCNC: 1.7 MG/DL (ref 0.6–1.3)
DEPRECATED RDW RBC AUTO: 44.4 FL (ref 37–54)
DEPRECATED RDW RBC AUTO: 44.8 FL (ref 37–54)
EOSINOPHIL # BLD AUTO: 0.02 10*3/MM3 (ref 0–0.3)
EOSINOPHIL NFR BLD AUTO: 0.2 % (ref 0–3)
ERYTHROCYTE [DISTWIDTH] IN BLOOD BY AUTOMATED COUNT: 13.1 % (ref 11.3–14.5)
ERYTHROCYTE [DISTWIDTH] IN BLOOD BY AUTOMATED COUNT: 13.4 % (ref 11.3–14.5)
GFR SERPL CREATININE-BSD FRML MDRD: 40 ML/MIN/1.73
GFR SERPL CREATININE-BSD FRML MDRD: 40 ML/MIN/1.73
GLUCOSE BLD-MCNC: 125 MG/DL (ref 70–100)
GLUCOSE BLD-MCNC: 130 MG/DL (ref 70–100)
GLUCOSE BLDC GLUCOMTR-MCNC: 118 MG/DL (ref 70–130)
GLUCOSE BLDC GLUCOMTR-MCNC: 118 MG/DL (ref 70–130)
GLUCOSE BLDC GLUCOMTR-MCNC: 121 MG/DL (ref 70–130)
GLUCOSE BLDC GLUCOMTR-MCNC: 126 MG/DL (ref 70–130)
GLUCOSE BLDC GLUCOMTR-MCNC: 127 MG/DL (ref 70–130)
GLUCOSE BLDC GLUCOMTR-MCNC: 130 MG/DL (ref 70–130)
GLUCOSE BLDC GLUCOMTR-MCNC: 132 MG/DL (ref 70–130)
GLUCOSE BLDC GLUCOMTR-MCNC: 133 MG/DL (ref 70–130)
GLUCOSE BLDC GLUCOMTR-MCNC: 134 MG/DL (ref 70–130)
GLUCOSE BLDC GLUCOMTR-MCNC: 134 MG/DL (ref 70–130)
GLUCOSE BLDC GLUCOMTR-MCNC: 135 MG/DL (ref 70–130)
GLUCOSE BLDC GLUCOMTR-MCNC: 135 MG/DL (ref 70–130)
GLUCOSE BLDC GLUCOMTR-MCNC: 137 MG/DL (ref 70–130)
GLUCOSE BLDC GLUCOMTR-MCNC: 137 MG/DL (ref 70–130)
HCT VFR BLD AUTO: 28.9 % (ref 38.9–50.9)
HCT VFR BLD AUTO: 29.5 % (ref 38.9–50.9)
HGB BLD-MCNC: 10 G/DL (ref 13.1–17.5)
HGB BLD-MCNC: 9.8 G/DL (ref 13.1–17.5)
IMM GRANULOCYTES # BLD: 0.04 10*3/MM3 (ref 0–0.03)
IMM GRANULOCYTES NFR BLD: 0.4 % (ref 0–0.6)
INR PPP: 1.13 (ref 0.91–1.09)
LYMPHOCYTES # BLD AUTO: 0.47 10*3/MM3 (ref 0.6–4.8)
LYMPHOCYTES NFR BLD AUTO: 4.6 % (ref 24–44)
MAGNESIUM SERPL-MCNC: 1.9 MG/DL (ref 1.3–2.7)
MAGNESIUM SERPL-MCNC: 2 MG/DL (ref 1.3–2.7)
MCH RBC QN AUTO: 31.2 PG (ref 27–31)
MCH RBC QN AUTO: 31.3 PG (ref 27–31)
MCHC RBC AUTO-ENTMCNC: 33.9 G/DL (ref 32–36)
MCHC RBC AUTO-ENTMCNC: 33.9 G/DL (ref 32–36)
MCV RBC AUTO: 92 FL (ref 80–99)
MCV RBC AUTO: 92.2 FL (ref 80–99)
MONOCYTES # BLD AUTO: 1.13 10*3/MM3 (ref 0–1)
MONOCYTES NFR BLD AUTO: 11.1 % (ref 0–12)
NEUTROPHILS # BLD AUTO: 8.48 10*3/MM3 (ref 1.5–8.3)
NEUTROPHILS NFR BLD AUTO: 83.4 % (ref 41–71)
PHOSPHATE SERPL-MCNC: 4.1 MG/DL (ref 2.4–5.1)
PHOSPHATE SERPL-MCNC: 4.6 MG/DL (ref 2.4–5.1)
PLATELET # BLD AUTO: 178 10*3/MM3 (ref 150–450)
PLATELET # BLD AUTO: 179 10*3/MM3 (ref 150–450)
PMV BLD AUTO: 10.4 FL (ref 6–12)
PMV BLD AUTO: 10.4 FL (ref 6–12)
POTASSIUM BLD-SCNC: 4.3 MMOL/L (ref 3.5–5.5)
POTASSIUM BLD-SCNC: 4.3 MMOL/L (ref 3.5–5.5)
PROTHROMBIN TIME: 11.9 SECONDS (ref 9.6–11.5)
RBC # BLD AUTO: 3.14 10*6/MM3 (ref 4.2–5.76)
RBC # BLD AUTO: 3.2 10*6/MM3 (ref 4.2–5.76)
SODIUM BLD-SCNC: 135 MMOL/L (ref 132–146)
SODIUM BLD-SCNC: 135 MMOL/L (ref 132–146)
UNIT  ABO: NORMAL
UNIT  ABO: NORMAL
UNIT  RH: NORMAL
UNIT  RH: NORMAL
WBC NRBC COR # BLD: 10.17 10*3/MM3 (ref 3.5–10.8)
WBC NRBC COR # BLD: 10.28 10*3/MM3 (ref 3.5–10.8)

## 2018-04-06 PROCEDURE — 99291 CRITICAL CARE FIRST HOUR: CPT | Performed by: INTERNAL MEDICINE

## 2018-04-06 PROCEDURE — 25010000002 ALBUMIN HUMAN 5% PER 50 ML: Performed by: NURSE PRACTITIONER

## 2018-04-06 PROCEDURE — 25010000002 CEFUROXIME PER 750 MG: Performed by: PHYSICIAN ASSISTANT

## 2018-04-06 PROCEDURE — 93010 ELECTROCARDIOGRAM REPORT: CPT | Performed by: INTERNAL MEDICINE

## 2018-04-06 PROCEDURE — 83735 ASSAY OF MAGNESIUM: CPT | Performed by: PHYSICIAN ASSISTANT

## 2018-04-06 PROCEDURE — 85025 COMPLETE CBC W/AUTO DIFF WBC: CPT | Performed by: PHYSICIAN ASSISTANT

## 2018-04-06 PROCEDURE — 99024 POSTOP FOLLOW-UP VISIT: CPT | Performed by: THORACIC SURGERY (CARDIOTHORACIC VASCULAR SURGERY)

## 2018-04-06 PROCEDURE — 97163 PT EVAL HIGH COMPLEX 45 MIN: CPT

## 2018-04-06 PROCEDURE — 71045 X-RAY EXAM CHEST 1 VIEW: CPT

## 2018-04-06 PROCEDURE — 94799 UNLISTED PULMONARY SVC/PX: CPT

## 2018-04-06 PROCEDURE — 25010000002 MORPHINE SULFATE (PF) 2 MG/ML SOLUTION: Performed by: THORACIC SURGERY (CARDIOTHORACIC VASCULAR SURGERY)

## 2018-04-06 PROCEDURE — 97110 THERAPEUTIC EXERCISES: CPT

## 2018-04-06 PROCEDURE — 85610 PROTHROMBIN TIME: CPT | Performed by: PHYSICIAN ASSISTANT

## 2018-04-06 PROCEDURE — 25810000003 DEXTROSE 5 % WITH KCL 20 MEQ 20-5 MEQ/L-% SOLUTION: Performed by: PHYSICIAN ASSISTANT

## 2018-04-06 PROCEDURE — P9041 ALBUMIN (HUMAN),5%, 50ML: HCPCS | Performed by: NURSE PRACTITIONER

## 2018-04-06 PROCEDURE — 80069 RENAL FUNCTION PANEL: CPT | Performed by: THORACIC SURGERY (CARDIOTHORACIC VASCULAR SURGERY)

## 2018-04-06 PROCEDURE — 85027 COMPLETE CBC AUTOMATED: CPT | Performed by: THORACIC SURGERY (CARDIOTHORACIC VASCULAR SURGERY)

## 2018-04-06 PROCEDURE — 83735 ASSAY OF MAGNESIUM: CPT | Performed by: THORACIC SURGERY (CARDIOTHORACIC VASCULAR SURGERY)

## 2018-04-06 PROCEDURE — 82962 GLUCOSE BLOOD TEST: CPT

## 2018-04-06 PROCEDURE — 80069 RENAL FUNCTION PANEL: CPT | Performed by: PHYSICIAN ASSISTANT

## 2018-04-06 PROCEDURE — 93005 ELECTROCARDIOGRAM TRACING: CPT | Performed by: PHYSICIAN ASSISTANT

## 2018-04-06 RX ORDER — FAMOTIDINE 20 MG/1
20 TABLET, FILM COATED ORAL 2 TIMES DAILY PRN
Status: DISCONTINUED | OUTPATIENT
Start: 2018-04-06 | End: 2018-04-12 | Stop reason: HOSPADM

## 2018-04-06 RX ORDER — LEVOTHYROXINE SODIUM 0.05 MG/1
50 TABLET ORAL
Status: DISCONTINUED | OUTPATIENT
Start: 2018-04-07 | End: 2018-04-12 | Stop reason: HOSPADM

## 2018-04-06 RX ORDER — DEXTROSE MONOHYDRATE 25 G/50ML
25 INJECTION, SOLUTION INTRAVENOUS
Status: DISCONTINUED | OUTPATIENT
Start: 2018-04-06 | End: 2018-04-12 | Stop reason: HOSPADM

## 2018-04-06 RX ORDER — LIOTHYRONINE SODIUM 5 UG/1
5 TABLET ORAL 2 TIMES DAILY
Status: DISCONTINUED | OUTPATIENT
Start: 2018-04-06 | End: 2018-04-12 | Stop reason: HOSPADM

## 2018-04-06 RX ADMIN — ATORVASTATIN CALCIUM 40 MG: 40 TABLET, FILM COATED ORAL at 20:30

## 2018-04-06 RX ADMIN — OXYCODONE HYDROCHLORIDE AND ACETAMINOPHEN 2 TABLET: 5; 325 TABLET ORAL at 00:20

## 2018-04-06 RX ADMIN — OXYCODONE HYDROCHLORIDE AND ACETAMINOPHEN 2 TABLET: 5; 325 TABLET ORAL at 12:44

## 2018-04-06 RX ADMIN — ALBUMIN HUMAN 500 ML: 0.05 INJECTION, SOLUTION INTRAVENOUS at 00:21

## 2018-04-06 RX ADMIN — HYDROCODONE BITARTRATE AND ACETAMINOPHEN 1 TABLET: 7.5; 325 TABLET ORAL at 15:25

## 2018-04-06 RX ADMIN — MORPHINE SULFATE 2 MG: 2 INJECTION, SOLUTION INTRAMUSCULAR; INTRAVENOUS at 22:58

## 2018-04-06 RX ADMIN — ASPIRIN 325 MG: 325 TABLET, DELAYED RELEASE ORAL at 08:52

## 2018-04-06 RX ADMIN — WATER 1.5 G: 1 INJECTION INTRAMUSCULAR; INTRAVENOUS; SUBCUTANEOUS at 01:03

## 2018-04-06 RX ADMIN — OXYCODONE HYDROCHLORIDE AND ACETAMINOPHEN 2 TABLET: 5; 325 TABLET ORAL at 19:51

## 2018-04-06 RX ADMIN — WATER 1.5 G: 1 INJECTION INTRAMUSCULAR; INTRAVENOUS; SUBCUTANEOUS at 08:53

## 2018-04-06 RX ADMIN — WATER 1.5 G: 1 INJECTION INTRAMUSCULAR; INTRAVENOUS; SUBCUTANEOUS at 17:56

## 2018-04-06 RX ADMIN — Medication 2 TABLET: at 08:52

## 2018-04-06 RX ADMIN — OXYCODONE HYDROCHLORIDE AND ACETAMINOPHEN 2 TABLET: 5; 325 TABLET ORAL at 04:03

## 2018-04-06 RX ADMIN — MORPHINE SULFATE 2 MG: 2 INJECTION, SOLUTION INTRAMUSCULAR; INTRAVENOUS at 04:02

## 2018-04-06 RX ADMIN — METOPROLOL TARTRATE 12.5 MG: 25 TABLET, FILM COATED ORAL at 20:29

## 2018-04-06 RX ADMIN — LEVOTHYROXINE SODIUM 56 MCG: 112 TABLET ORAL at 05:40

## 2018-04-06 RX ADMIN — HYDROCODONE BITARTRATE AND ACETAMINOPHEN 1 TABLET: 7.5; 325 TABLET ORAL at 08:54

## 2018-04-06 RX ADMIN — LIOTHYRONINE SODIUM 5 MCG: 5 TABLET ORAL at 08:52

## 2018-04-06 RX ADMIN — MORPHINE SULFATE 2 MG: 2 INJECTION, SOLUTION INTRAMUSCULAR; INTRAVENOUS at 00:33

## 2018-04-06 RX ADMIN — POTASSIUM CHLORIDE AND DEXTROSE MONOHYDRATE 30 ML/HR: 150; 5 INJECTION, SOLUTION INTRAVENOUS at 01:03

## 2018-04-06 RX ADMIN — LIOTHYRONINE SODIUM 5 MCG: 5 TABLET ORAL at 20:30

## 2018-04-06 NOTE — PROGRESS NOTES
CTS Progress Note       LOS: 1 day   Patient Care Team:  Mina Story MD as PCP - General    Chief complaint coronary artery disease    Vital Signs:  Temp:  [95.6 °F (35.3 °C)-101.7 °F (38.7 °C)] 98.9 °F (37.2 °C)  Heart Rate:  [49-92] 64  Resp:  [12-20] 20  BP: ()/(52-80) 106/63  Arterial Line BP: ()/(42-73) 113/46  FiO2 (%):  [35 %-100 %] 35 %    Physical Exam: Extubated up in chair breathing unlabored no neurologic deficits minimal bleeding       Results:     Results from last 7 days  Lab Units 04/06/18  0412   WBC 10*3/mm3 10.17   HEMOGLOBIN g/dL 9.8*   HEMATOCRIT % 28.9*   PLATELETS 10*3/mm3 178       Results from last 7 days  Lab Units 04/06/18  0412   SODIUM mmol/L 135   POTASSIUM mmol/L 4.3   CHLORIDE mmol/L 101   CO2 mmol/L 26.0   BUN mg/dL 19   CREATININE mg/dL 1.70*   GLUCOSE mg/dL 125*   CALCIUM mg/dL 9.0           Imaging Results (last 24 hours)     Procedure Component Value Units Date/Time    XR Chest 1 View [[id number]] Updated:  04/06/18 0451    XR Chest 1 View [[id number]] Collected:  04/05/18 1416     Updated:  04/05/18 1502    Narrative:       EXAMINATION: XR CHEST 1 VW-04/05/2018:      INDICATION: Post-Op Check Line & Tube Placement;  I25.118-Atherosclerotic heart disease of native coronary artery with  other forms of angina pectoris.      COMPARISON: 04/02/2018.     FINDINGS: There are postoperative changes related to left thoracotomy.   An endotracheal tube is well positioned as is a [place] catheter and  left chest tube. There is minimal left basilar atelectasis. There is no  pneumothorax.       Impression:       Postoperative changes as described above. There is minimal  left basilar atelectasis. Support tubes are well positioned including  the endotracheal tube.     D:  04/05/2018  E:  04/05/2018     This report was finalized on 4/5/2018 3:00 PM by Dr. Jg Lindsey MD.       CT Outside Films [[id number]] Resulted:  04/05/18 0909     Updated:  04/05/18 0909    Narrative:        This procedure was auto-finalized with no dictation required.          Assessment    Principal Problem:    Three-vessel Coronary artery disease with 99% occluded stents to LAD, circumflex, RCA  Active Problems:    Unstable angina    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 No residual and no occlusive carotid artery disease    Slight elevation in serum creatinine.  Overall satisfactory course less than 24 hours post coronary surgery    Plan   Discontinue [place] catheter today    Please note that portions of this note were completed with a voice recognition program. Efforts were made to edit the dictations, but occasionally words are mistranscribed.    Rakesh Mittal MD  04/06/18  6:26 AM

## 2018-04-06 NOTE — PLAN OF CARE
Problem: Patient Care Overview  Goal: Plan of Care Review  Outcome: Ongoing (interventions implemented as appropriate)   04/06/18 1818   Coping/Psychosocial   Plan of Care Reviewed With patient;family   Plan of Care Review   Progress improving   OTHER   Outcome Summary DC [place]-jess[place] catheter and arterial line. Leave romo cath one more day per Intensivist d/t CRT. Pt ambulation x 2 with minimal assistance.        Problem: Skin Injury Risk (Adult)  Goal: Identify Related Risk Factors and Signs and Symptoms  Outcome: Ongoing (interventions implemented as appropriate)   04/06/18 1818   Skin Injury Risk (Adult)   Related Risk Factors (Skin Injury Risk) critical care admission;mechanical forces;medication;mobility impaired     Goal: Skin Health and Integrity  Outcome: Ongoing (interventions implemented as appropriate)      Problem: Cardiac Surgery (Adult)  Goal: Signs and Symptoms of Listed Potential Problems Will be Absent, Minimized or Managed (Cardiac Surgery)  Outcome: Ongoing (interventions implemented as appropriate)   04/06/18 1818   Goal/Outcome Evaluation   Problems Assessed (Cardiac Surgery) all   Problems Present (Cardiac Surgery) pain;situational response     Goal: Anesthesia/Sedation Recovery  Outcome: Ongoing (interventions implemented as appropriate)      Problem: Pain, Acute (Adult)  Goal: Identify Related Risk Factors and Signs and Symptoms  Outcome: Ongoing (interventions implemented as appropriate)   04/06/18 1818   Pain, Acute (Adult)   Related Risk Factors (Acute Pain) patient perception;positioning;procedure/treatment;surgery   Signs and Symptoms (Acute Pain) BADLs/IADLs reluctance/inability to perform;guarding/abnormal posturing/positioning;verbalization of pain descriptors     Goal: Acceptable Pain Control/Comfort Level  Outcome: Ongoing (interventions implemented as appropriate)      Problem: Fall Risk (Adult)  Goal: Identify Related Risk Factors and Signs and Symptoms  Outcome: Outcome(s)  achieved Date Met: 04/06/18 04/06/18 1816   Fall Risk (Adult)   Related Risk Factors (Fall Risk) homeostatic imbalance;polypharmacy;environment unfamiliar   Signs and Symptoms (Fall Risk) other (see comments)     Goal: Absence of Fall  Outcome: Ongoing (interventions implemented as appropriate)

## 2018-04-06 NOTE — THERAPY EVALUATION
Acute Care - Physical Therapy Initial Evaluation  [facility]     Patient Name: Kavon Waters Jr.  : 1950  MRN: [id number]  Today's Date: 2018   Onset of Illness/Injury or Date of Surgery: 18  Date of Referral to PT: 18  Referring Physician: MD Mittal      Admit Date: 2018    Visit Dx:     ICD-10-CM ICD-9-CM   1. Impaired functional mobility, balance, gait, and endurance Z74.09 V49.89   2. Coronary artery disease of native artery of native heart with stable angina pectoris I25.118 414.01     413.9     Patient Active Problem List   Diagnosis   • Unstable angina   • Angina at rest   • Coronary artery disease involving native coronary artery of native heart with angina pectoris   • Peripheral arterial disease   • Three-vessel Coronary artery disease with 99% occluded stents to LAD, circumflex, RCA   • S/P CABG x 2. 18   • Hypertension   • Hyperlipidemia   • GERD (gastroesophageal reflux disease)   • History of prostate Cancer s/p radiation therapy   • History of left corona radiata CVA . No residual and no occlusive carotid artery disease     Past Medical History:   Diagnosis Date   • Arrhythmia    • Arthritis    • AV block, 1st degree    • Borderline diabetes    • Cancer     PROSTATE.  HAD 43 RADIATION TREATMENTS.   • Constipation    • Coronary artery disease    • Disease of thyroid gland    • Eczema    • Full dentures    • GERD (gastroesophageal reflux disease)    • H/O heart artery stent     X 7   • Hyperlipidemia    • Hypertension    • Low sodium levels    • PAUL (obstructive sleep apnea)     NO CPAP USE   • RBBB    • Seasonal allergies    • Skin cancer    • Stroke     AFFECTED RIGHT ARM/LEG.  PT DENIES RESIDUAL DEFICITS.   • Wears glasses      Past Surgical History:   Procedure Laterality Date   • CARDIAC CATHETERIZATION     • CARDIAC CATHETERIZATION N/A 3/30/2018    Procedure: Left Heart Cath;  Surgeon: Moshe Ahn MD;  Location: [facility] INVASIVE LOCATION;   Service: Cardiovascular   • CHOLECYSTECTOMY     • COLONOSCOPY     • CORONARY ARTERY BYPASS GRAFT N/A 4/5/2018    Procedure: MEDIASTERNOTOMY CORONARY ARTERY BYPASS GRAFT X2 UTILIZING LEFT INTERNAL MAMMARY ARTERY, EVH LEFT GREATER SAPHENOUS VEIN;  Surgeon: Rakesh Mittal MD;  Location: [facility] OR;  Service: Cardiothoracic   • CORONARY STENT PLACEMENT     • SKIN CANCER EXCISION          PT ASSESSMENT (last 72 hours)      Physical Therapy Evaluation     Row Name 04/06/18 1045          PT Evaluation Time/Intention    Subjective Information complains of;weakness  -PEEWEE     Document Type evaluation  -PEEWEE     Mode of Treatment physical therapy  -PEEWEE     Patient Effort good  -PEEWEE     Symptoms Noted During/After Treatment fatigue;increased pain  -PEEWEE     Row Name 04/06/18 1045          General Information    Patient Profile Reviewed? yes  -PEEWEE     Onset of Illness/Injury or Date of Surgery 04/05/18  -PEEWEE     Referring Physician MD Mittal  -PEEWEE     Prior Level of Function independent:;gait;transfer;bed mobility;ADL's  -PEEWEE     Equipment Currently Used at Home none  -PEEWEE     Pertinent History of Current Functional Problem patient was admitted with 3 vessel heart disease  -PEEWEE     Existing Precautions/Restrictions cardiac;fall;oxygen therapy device and L/min;sternal  -PEEWEE     Risks Reviewed patient:;LOB;increased discomfort;change in vital signs  -PEEWEE     Benefits Reviewed patient:;improve function;increase independence;increase strength;decrease risk of DVT  -PEEWEE     Barriers to Rehab none identified  -PEEWEE     Row Name 04/06/18 1045          Relationship/Environment    Primary Source of Support/Comfort spouse  -PEEWEE     Lives With spouse  -PEEWEE     Row Name 04/06/18 1045          Resource/Environmental Concerns    Current Living Arrangements home/apartment/condo  -PEEWEE     Row Name 04/06/18 1045          Cognitive Assessment/Interventions    Additional Documentation Cognitive Assessment/Intervention (Group)  -PEEWEE     Row Name 04/06/18 1042           Cognitive Assessment/Intervention- PT/OT    Affect/Mental Status (Cognitive) WFL  -PEEWEE     Orientation Status (Cognition) oriented x 4  -PEEWEE     Follows Commands (Cognition) WFL  -PEEWEE     Cognitive Function (Cognitive) WFL  -PEEWEE     Safety Deficit (Cognitive) insight into deficits/self awareness  -     Personal Safety Interventions gait belt;nonskid shoes/slippers when out of bed  -PEEWEE     Row Name 04/06/18 1045          Safety Issues, Functional Mobility    Safety Issues Affecting Function (Mobility) safety precaution awareness;safety precautions follow-through/compliance  -     Row Name 04/06/18 1045          Bed Mobility Assessment/Treatment    Bed Mobility Assessment/Treatment sit-supine  -     Sit-Supine [place] (Bed Mobility) moderate assist (50% patient effort);2 person assist  -PEEWEE     Bed Mobility, Safety Issues decreased use of arms for pushing/pulling  -     Assistive Device (Bed Mobility) draw sheet  -     Row Name 04/06/18 1045          Transfer Assessment/Treatment    Transfer Assessment/Treatment sit-stand transfer;stand-sit transfer  -     Sit-Stand [place] (Transfers) minimum assist (75% patient effort);2 person assist  -PEEWEE     Stand-Sit [place] (Transfers) minimum assist (75% patient effort);2 person assist  -PEEWEE     Row Name 04/06/18 1045          Gait/Stairs Assessment/Training    Gait/Stairs Assessment/Training gait/ambulation independence  -     [place] Level (Gait) minimum assist (75% patient effort);2 person assist  -PEEWEE     Distance in Feet (Gait) 100  -PEEWEE     Pattern (Gait) step-through  -     Row Name 04/06/18 1045          General ROM    GENERAL ROM COMMENTS no ROM deficits  -     Row Name 04/06/18 1045          MMT (Manual Muscle Testing)    Additional Documentation General Assessment (Manual Muscle Testing) (Group)  -     Row Name 04/06/18 1045          General Assessment (Manual Muscle Testing)    General Manual Muscle Testing (MMT) Assessment no  strength deficits identified  -[facility] Name 04/06/18 1045          Motor Assessment/Intervention    Additional Documentation Balance (Group);Therapeutic Exercise (Group)  -     Row Name 04/06/18 1045          Therapeutic Exercise    Therapeutic Exercise seated, lower extremities  -     Additional Documentation Therapeutic Exercise (Row)  -[facility] Name 04/06/18 1045          Lower Extremity Seated Therapeutic Exercise    Performed, Seated Lower Extremity (Therapeutic Exercise) hip flexion/extension;knee flexion/extension;ankle dorsiflexion/plantarflexion  -     Exercise Type, Seated Lower Extremity (Therapeutic Exercise) AROM (active range of motion)  -PEEWEE     Sets/Reps Detail, Seated Lower Extremity (Therapeutic Exercise) 10 reps  -[facility] Name 04/06/18 1045          Balance    Balance static sitting balance;static standing balance  -[facility] Name 04/06/18 1045          Static Sitting Balance    Level of [place] (Unsupported Sitting, Static Balance) independent  -     Sitting Position (Unsupported Sitting, Static Balance) sitting on edge of bed  -     Time Able to Maintain Position (Unsupported Sitting, Static Balance) 4 to 5 minutes  -[facility] Name 04/06/18 1045          Static Standing Balance    Level of [place] (Supported Standing, Static Balance) minimal assist, 75% patient effort  -     Time Able to Maintain Position (Supported Standing, Static Balance) 2 to 3 minutes  -[facility] Name 04/06/18 1045          Pain Assessment    Additional Documentation Pain Scale: Numbers Pre/Post-Treatment (Group)  -PEEWEE     Row Name 04/06/18 1045          Pain Scale: Numbers Pre/Post-Treatment    Pain Scale: Numbers, Pretreatment 3/10  -PEEWEE     Pain Scale: Numbers, Post-Treatment 4/10  -PEEWEE     Pain Location chest  -PEEWEE     Pre/Post Treatment Pain Comment resting in bed  -[facility] Name             Wound 04/05/18 0825 Left leg incision    Wound - Properties Group Date first assessed: 04/05/18   -SV Time first assessed: 0825  -SV Side: Left  -SV Location: leg  -SV Type: incision  -SV    Row Name             Wound 04/05/18 0825 Other (See comments) chest incision    Wound - Properties Group Date first assessed: 04/05/18  -SV Time first assessed: 0825  -SV Side: Other (See comments)  -SV Location: chest  -SV Type: incision  -SV    Row Name 04/06/18 1045          Coping    Observed Emotional State calm;cooperative  -PEEWEE     Verbalized Emotional State acceptance  -PEEWEE     Row Name 04/06/18 1045          Plan of Care Review    Plan of Care Reviewed With patient  -PEEWEE     Row Name 04/06/18 1045          Physical Therapy Clinical Impression    Date of Referral to PT 04/06/18  -PEEWEE     PT Diagnosis (PT Clinical Impression) impaired bed mobility transfer and gait  -PEEWEE     Patient/Family Goals Statement (PT Clinical Impression) patient to go home with family assist  -PEEWEE     Criteria for Skilled Interventions Met (PT Clinical Impression) yes;treatment indicated  -PEEWEE     Rehab Potential (PT Clinical Summary) good, to achieve stated therapy goals  -PEEWEE     Care Plan Review (PT) care plan/treatment goals reviewed;risks/benefits reviewed;patient/other agree to care plan  -PEEWEE     Row Name 04/06/18 1045          Vital Signs    Pre Systolic BP Rehab 105  -PEEWEE     Pre Treatment Diastolic BP 62  -PEEWEE     Post Systolic BP Rehab 114  -PEEWEE     Post Treatment Diastolic BP 66  -PEEWEE     Pretreatment Heart Rate (beats/min) 62  -PEEWEE     Posttreatment Heart Rate (beats/min) 74  -PEEWEE     Pre SpO2 (%) 94  -PEEWEE     O2 Delivery Pre Treatment nasal cannula  -PEEWEE     Post SpO2 (%) 95  -PEEWEE     O2 Delivery Post Treatment supplemental O2  -PEEWEE     Pre Patient Position Sitting  -PEEWEE     Intra Patient Position Standing  -PEEWEE     Post Patient Position Supine  -PEEWEE     Row Name 04/06/18 1045          Physical Therapy Goals    Bed Mobility Goal Selection (PT) bed mobility, PT goal 1  -PEEWEE     Transfer Goal Selection (PT) transfer, PT goal 1  -PEEWEE     Gait Training  Goal Selection (PT) gait training, PT goal 1  -PEEWEE     Row Name 04/06/18 1045          Bed Mobility Goal 1 (PT)    Activity/Assistive Device (Bed Mobility Goal 1, PT) sit to supine/supine to sit  -PEEWEE     [place] Level/Cues Needed (Bed Mobility Goal 1, PT) independent  -PEEWEE     Time Frame (Bed Mobility Goal 1, PT) 10 days  -PEEWEE     Progress/Outcomes (Bed Mobility Goal 1, PT) goal ongoing  -PEEWEE     Row Name 04/06/18 1045          Transfer Goal 1 (PT)    Activity/Assistive Device (Transfer Goal 1, PT) transfers, all  -PEEWEE     [place] Level/Cues Needed (Transfer Goal 1, PT) independent  -PEEWEE     Time Frame (Transfer Goal 1, PT) 10 days  -PEEWEE     Progress/Outcome (Transfer Goal 1, PT) goal ongoing  -PEEWEE     Row Name 04/06/18 1045          Gait Training Goal 1 (PT)    Activity/Assistive Device (Gait Training Goal 1, PT) gait (walking locomotion)  -PEEWEE     [place] Level (Gait Training Goal 1, PT) independent  -PEEWEE     Distance (Gait Goal 1, PT) 400  -PEEWEE     Time Frame (Gait Training Goal 1, PT) 10 days  -PEEWEE     Progress/Outcome (Gait Training Goal 1, PT) goal ongoing  -PEEWEE     Row Name 04/06/18 1045          Patient Education Goal (PT)    Activity (Patient Education Goal, PT) verbalizes HEP  -PEEWEE     [place]/Cues/Accuracy (Memory Goal 2, PT) verbalizes understanding  -PEEWEE     Time Frame (Patient Education Goal, PT) 10 days  -PEEWEE     Progress/Outcome (Patient Education Goal, PT) goal ongoing  -PEEWEE     Row Name 04/06/18 1045          Positioning and Restraints    Pre-Treatment Position sitting in chair/recliner  -PEEWEE     Post Treatment Position bed  -PEEWEE     In Bed notified nsg;supine;call light within reach;encouraged to call for assist  -PEEWEE       User Key  (r) = Recorded By, (t) = Taken By, (c) = Cosigned By    Initials Name Provider Type    PEEWEE Dipti Mcintyre, PT Physical Therapist    SOUMYA Fishman, RN Registered Nurse          Physical Therapy Education     Title: PT OT SLP Therapies (Active)     Topic:  Physical Therapy (Active)     Point: Mobility training (Active)    Learning Progress Summary     Learner Status Readiness Method Response Comment Documented by    Patient Active Acceptance E NR   04/06/18 1337          Point: Home exercise program (Active)    Learning Progress Summary     Learner Status Readiness Method Response Comment Documented by    Patient Active Acceptance E NR  PEEWEE 04/06/18 1337          Point: Body mechanics (Active)    Learning Progress Summary     Learner Status Readiness Method Response Comment Documented by    Patient Active Acceptance E NR   04/06/18 1337          Point: Precautions (Active)    Learning Progress Summary     Learner Status Readiness Method Response Comment Documented by    Patient Active Acceptance E NR  PEEWEE 04/06/18 1337                      User Key     Initials Effective Dates Name Provider Type Discipline     06/19/15 -  Dipti Mcintyre, PT Physical Therapist PT                PT Recommendation and Plan  Anticipated Discharge Disposition (PT): home or self care  Planned Therapy Interventions (PT Eval): gait training, bed mobility training, home exercise program, transfer training  Therapy Frequency (PT Clinical Impression): daily  Outcome Summary/Treatment Plan (PT)  Anticipated Discharge Disposition (PT): home or self care  Plan of Care Reviewed With: patient  Outcome Summary: patient able to ambulate 100 ft with min assist and stable vitals anticipate patient  going home at D/C          Outcome Measures     Row Name 04/06/18 1045             How much help from another person do you currently need...    Turning from your back to your side while in flat bed without using bedrails? 3  -PEEWEE      Moving from lying on back to sitting on the side of a flat bed without bedrails? 2  -PEEWEE      Moving to and from a bed to a chair (including a wheelchair)? 3  -PEEWEE      Standing up from a chair using your arms (e.g., wheelchair, bedside chair)? 3  -PEEWEE      Climbing 3-5 steps  with a railing? 2  -PEEWEE      To walk in hospital room? 3  -PEEWEE      AM-PAC 6 Clicks Score 16  -PEEWEE         Functional Assessment    Outcome Measure Options AM-PAC 6 Clicks Basic Mobility (PT)  -PEEWEE        User Key  (r) = Recorded By, (t) = Taken By, (c) = Cosigned By    Initials Name Provider Type    PEEWEE Mcintyre PT Physical Therapist           Time Calculation:         PT Charges     Row Name 04/06/18 1338             Time Calculation    Start Time 1045  -PEEWEE      PT Received On 04/06/18  -PEEWEE      PT Goal Re-Cert Due Date 04/16/18  -PEEWEE         Time Calculation- PT    Total Timed Code Minutes- PT 18 minute(s)  -PEEWEE        User Key  (r) = Recorded By, (t) = Taken By, (c) = Cosigned By    Initials Name Provider Type    PEEWEE Mcintyre PT Physical Therapist          Therapy Charges for Today     Code Description Service Date Service Provider Modifiers Qty    18995042771 HC PT EVAL HIGH COMPLEXITY 3 4/6/2018 Dipti Mcintyre, PT GP 1    87914582019 HC PT THER PROC EA 15 MIN 4/6/2018 Dipti Mcintyre, PT GP 1    41208564037 HC PT THER SUPP EA 15 MIN 4/6/2018 Dipti Mcintyre, PT GP 2          PT G-Codes  Outcome Measure Options: AM-PAC 6 Clicks Basic Mobility (PT)      Dipti Mcintyre, PT  4/6/2018

## 2018-04-06 NOTE — PROGRESS NOTES
Continued Stay Note   [facility]     Patient Name: Kavon Waters Jr.  MRN: [id number]  Today's Date: 4/6/2018    Admit Date: 4/5/2018          Discharge Plan     Row Name 04/06/18 1143       Plan    Plan Home     Patient/Family in Agreement with Plan yes    Plan Comments Spoke with patient at bedside. Patient very sleepy throughout conversation. Patient lives in [place] with wife Mara. No DME in the home. Patient has coverage for medications. Independent prior to hospitalization. CM to follow for d/c planning needs. Areli @ [phone]               Discharge Codes    No documentation.           Reny Hale RN

## 2018-04-06 NOTE — PLAN OF CARE
Problem: Patient Care Overview  Goal: Plan of Care Review  Outcome: Ongoing (interventions implemented as appropriate)   04/06/18 0337   Coping/Psychosocial   Plan of Care Reviewed With patient   Plan of Care Review   Progress improving   OTHER   Outcome Summary 500cc more of albumin given (1L total). Nitro/Dobutamine drips weaned off. Pain control. Temp up to 101.7, tylenol given. SWAN, V-wires, Roa, MT's remain. Will ambulate to chair. Intact neuro. On 4L NC       Problem: Skin Injury Risk (Adult)  Goal: Identify Related Risk Factors and Signs and Symptoms  Outcome: Ongoing (interventions implemented as appropriate)   04/05/18 1818   Skin Injury Risk (Adult)   Related Risk Factors (Skin Injury Risk) critical care admission;fluid intake inadequate;hypothermia/hyperthermia;hospitalization prolonged;mobility impaired     Goal: Skin Health and Integrity  Outcome: Ongoing (interventions implemented as appropriate)   04/06/18 0337   Skin Injury Risk (Adult)   Skin Health and Integrity making progress toward outcome       Problem: Cardiac Surgery (Adult)  Goal: Signs and Symptoms of Listed Potential Problems Will be Absent, Minimized or Managed (Cardiac Surgery)  Outcome: Ongoing (interventions implemented as appropriate)   04/05/18 1818   Goal/Outcome Evaluation   Problems Assessed (Cardiac Surgery) all   Problems Present (Cardiac Surgery) hemodynamic instability;pain     Goal: Anesthesia/Sedation Recovery  Outcome: Ongoing (interventions implemented as appropriate)   04/06/18 0337   Goal/Outcome Evaluation   Anesthesia/Sedation Recovery recovered to baseline       Problem: Pain, Acute (Adult)  Goal: Identify Related Risk Factors and Signs and Symptoms  Outcome: Ongoing (interventions implemented as appropriate)   04/05/18 1818 04/06/18 0337   Pain, Acute (Adult)   Related Risk Factors (Acute Pain) knowledge deficit;patient perception;persistent pain;positioning;procedure/treatment;surgery --    Signs and Symptoms  (Acute Pain) --  facial mask of pain/grimace;fatigue/weakness;verbalization of pain descriptors     Goal: Acceptable Pain Control/Comfort Level   04/06/18 0337   Pain, Acute (Adult)   Acceptable Pain Control/Comfort Level making progress toward outcome

## 2018-04-06 NOTE — PROGRESS NOTES
"[facility] Progress Note     LOS: 1 day   Patient Care Team:  Mina Story MD as PCP - General    Chief Complaint:  No chief complaint on file.      Subjective     Interval History:     Awake and alert, complaining of chest wall pain, no shortness breath palpitations or nausea    Review of Systems:   All systems were reviewed and negative except for:  Constitution:  positive for fatigue    Objective     Vital Sign Min/Max for last 24 hours  Temp  Min: 95.6 °F (35.3 °C)  Max: 101.7 °F (38.7 °C)   BP  Min: 92/61  Max: 152/78   Pulse  Min: 49  Max: 92   Resp  Min: 12  Max: 20   SpO2  Min: 93 %  Max: 100 %   Flow (L/min)  Min: 3  Max: 4   Weight  Min: 100 kg (220 lb 7.4 oz)  Max: 100 kg (221 lb)     Flowsheet Rows    Flowsheet Row First Filed Value   Admission Height 182.9 cm (72\") Documented at 04/05/2018 0618   Admission Weight 100 kg (221 lb) Documented at 04/05/2018 0618          Physical Exam:   General Appearance: alert, appears stated age and cooperative  Lungs: clear to auscultation, respirations regular, respirations even and respirations unlabored  Heart: regular rhythm & normal rate, normal S1, S2, no murmur, no evon, no rub and no click  Abdomen: normal bowel sounds, no masses, no hepatomegaly, no splenomegaly, soft non-tender, no guarding and no rebound tenderness  Extremities: moves extremities well, no edema, no cyanosis and no redness  Pulses: Pulses palpable and equal bilaterally  Skin: no bleeding, bruising or rash  Psych: normal     Results Review:     I reviewed the patient's new clinical results.    Results from last 7 days  Lab Units 04/06/18 0412 04/06/18  0015 04/05/18  1849   SODIUM mmol/L 135 135 136   POTASSIUM mmol/L 4.3 4.3 4.2   CHLORIDE mmol/L 101 102 103   CO2 mmol/L 26.0 24.0 25.0   BUN mg/dL 19 18 18   CREATININE mg/dL 1.70* 1.70* 1.60*   GLUCOSE mg/dL 125* 130* 129*   CALCIUM mg/dL 9.0 8.9 9.0       Results from last 7 days  Lab Units 04/06/18 0412 04/06/18  0015 " 04/05/18  1849   WBC 10*3/mm3 10.17 10.28 9.69   HEMOGLOBIN g/dL 9.8* 10.0* 9.9*   HEMATOCRIT % 28.9* 29.5* 29.6*   PLATELETS 10*3/mm3 178 179 176     No results found for: TROPONINT        Results from last 7 days  Lab Units 04/06/18  0412 04/05/18  1058 04/02/18  0956   INR  1.13* 1.21* 1.04       Results from last 7 days  Lab Units 04/05/18  1623   PH, ARTERIAL pH units 7.429   PO2 ART mm Hg 100.0   PCO2, ARTERIAL mm Hg 36.9   HCO3 ART mmol/L 24.4       Ejection Fraction  No results found for: EF    Echo EF Estimated  No results found for: ECHOEFEST    Nuclear Stress Ejection Fraction  No components found for: NUCEF    Cath Ejection Fraction Quantitative  No results found for: CATHEF    Physical Exam    Medication Review: yes  Current Facility-Administered Medications   Medication Dose Route Frequency Provider Last Rate Last Dose   • acetaminophen (TYLENOL) tablet 650 mg  650 mg Oral Q4H PRN Rakesh Mittal MD   650 mg at 04/05/18 2106   • albuterol (PROVENTIL) nebulizer solution 0.083% 2.5 mg/3mL  2.5 mg Nebulization Q4H PRN GINNY Rivers       • aspirin EC tablet 325 mg  325 mg Oral Daily GINNY Rivers       • atorvastatin (LIPITOR) tablet 40 mg  40 mg Oral Nightly GINNY Rivers   40 mg at 04/05/18 2055   • bisacodyl (DULCOLAX) EC tablet 10 mg  10 mg Oral Daily PRN GINNY Rivers       • bisacodyl (DULCOLAX) suppository 10 mg  10 mg Rectal Daily PRN GINNY Rivers       • calcium gluconate 1 g in sodium chloride 0.9 % 100 mL IVPB  1 g Intravenous Once PRN GINNY Rivers        And   • calcium gluconate 6 g in sodium chloride 0.9 % 500 mL IVPB  6 g Intravenous Once PRN GINNY Rivers       • calcium gluconate 2 g in sodium chloride 0.9 % 100 mL IVPB  2 g Intravenous Once PRN GINNY Rivers       • cefuroxime(ZINACEF) in SWFI 1500mg/20ml IV PUSH syringe  1.5 g Intravenous Q8H GINNY Rivers   1.5 g at 04/06/18 0103   • chlorhexidine (PERIDEX) 0.12 %  solution 15 mL  15 mL Mouth/Throat Q12H GINNY Rivers   15 mL at 04/05/18 2055   • dexmedetomidine (PRECEDEX) 400 mcg/100 mL (4 mcg/mL) infusion  0.2-1.5 mcg/kg/hr Intravenous Continuous PRN GINNY Rivers       • dextrose 5 % with KCl 20 mEq/L infusion  30 mL/hr Intravenous Continuous GINNY Rivers 30 mL/hr at 04/06/18 0103 30 mL/hr at 04/06/18 0103   • DOBUTamine (DOBUTREX) 1 mg/mL infusion  2-20 mcg/kg/min Intravenous Continuous PRN GINNY Rivers   Stopped at 04/06/18 0145   • docusate sodium (COLACE) capsule 100 mg  100 mg Oral BID PRN GINNY Rivers       • DOPamine 400 mg/250 mL (1.6 mg/mL) infusion  2-20 mcg/kg/min Intravenous Continuous PRN GINNY Rivers       • EPINEPHrine (ADRENALIN) 10,000 mcg in sodium chloride 0.9 % 250 mL (40 mcg/mL) infusion  0.02-0.3 mcg/kg/min Intravenous Continuous PRN GINNY Rivers       • fentaNYL citrate (PF) (SUBLIMAZE) injection 25 mcg  25 mcg Intravenous Q30 Min PRN Rakesh Mittal MD   25 mcg at 04/05/18 1749    And   • naloxone (NARCAN) injection 0.4 mg  0.4 mg Intravenous Q5 Min PRN Rakesh Mittal MD       • HYDROcodone-acetaminophen (NORCO) 7.5-325 MG per tablet 1 tablet  1 tablet Oral Q4H PRN Rakesh Mittal MD       • insulin regular (HumuLIN R,NovoLIN R) 100 Units in sodium chloride 0.9 % 100 mL (1 Units/mL) infusion  0-50 Units/hr Intravenous Continuous PRN GINNY Rivers 2.4 mL/hr at 04/06/18 0300 2.4 Units/hr at 04/06/18 0300   • levothyroxine (SYNTHROID, LEVOTHROID) tablet 56 mcg  56 mcg Oral Q AM GINNY Rivers   56 mcg at 04/06/18 0540   • liothyronine (CYTOMEL) tablet 5 mcg  5 mcg Oral Daily GINNY Rivers       • magnesium hydroxide (MILK OF MAGNESIA) suspension 2400 mg/10mL 10 mL  10 mL Oral Daily PRN GINNY Rivers       • Magnesium Sulfate 2 gram Bolus, followed by 8 gram infusion (total Mg dose 10 grams)- Mg less than or equal to 1mg/dL  2 g Intravenous PRN GINNY Rivers         Or   • Magnesium Sulfate 6 gram Infusion (2 gm x 3) -Mg 1.1 -1.5 mg/dL  2 g Intravenous PRN GINNY Rivers        Or   • magnesium sulfate 4 gram infusion- Mg 1.6-1.9 mg/dL  4 g Intravenous PRN GINNY Rivers       • metoprolol tartrate (LOPRESSOR) half tablet 12.5 mg  12.5 mg Oral Q12H GINNY Rivers       • metoprolol tartrate (LOPRESSOR) injection 2.5 mg  2.5 mg Intravenous Q6H GINNY Rivers       • Morphine sulfate (PF) injection 2 mg  2 mg Intravenous Q30 Min PRN Rakesh Mittal MD   2 mg at 04/06/18 0402   • niCARdipine (CARDENE-IV) 40 mg/200 mL (0.2 mg/mL) in 0.9% NaCl infusion  5-15 mg/hr Intravenous Continuous PRN GINNY Rivers       • nitroglycerin 50 mg/250 mL (0.2 mg/mL) infusion  5-200 mcg/min Intravenous Continuous PRN GINNY Rivers   Stopped at 04/06/18 0000   • norepinephrine (LEVOPHED) 8 mg/250 mL (32 mcg/mL) in sodium chloride 0.9% infusion (premix)  0.02-0.3 mcg/kg/min Intravenous Continuous PRN GINNY Rivers   Stopped at 04/05/18 1518   • ondansetron (ZOFRAN) injection 4 mg  4 mg Intravenous Q6H PRN GINNY Rivesr   4 mg at 04/05/18 1856   • oxyCODONE-acetaminophen (PERCOCET) 5-325 MG per tablet 2 tablet  2 tablet Oral Q4H PRN Rakesh Mittal MD   2 tablet at 04/06/18 0403   • Pharmacy Consult - MTM   Does not apply Daily PHAM Melgoza RPH       • phenylephrine (MELONY-SYNEPHRINE) 50 mg/250 mL (0.2 mg/mL) in 0.9% NS  infusion  0.5-3 mcg/kg/min Intravenous Continuous PRN GINNY Rivers       • potassium chloride (MICRO-K) CR capsule 40 mEq  40 mEq Oral PRN GINNY Rivers        Or   • potassium chloride (KLOR-CON) packet 40 mEq  40 mEq Oral PRN GINNY Rivers       • potassium chloride 20 mEq in 50 mL IVPB  20 mEq Intravenous Q1H PRN GINNY Rivers        Or   • potassium chloride 20 mEq in 50 mL IVPB  20 mEq Intravenous Q1H PRN GINNY Rivers       • propofol (DIPRIVAN) infusion 10 mg/mL 100 mL  5-50  mcg/kg/min Intravenous Continuous PRN Rakesh Mittal MD   Stopped at 04/05/18 1518   • racemic epinephrine (RACEPINEPHRINE) nebulizer solution 0.5 mL  0.5 mL Nebulization Once PRN GINNY Rivers       • sennosides-docusate sodium (SENOKOT-S) 8.6-50 MG tablet 2 tablet  2 tablet Oral BID PRN GINNY Rivers       • sodium chloride 0.9 % flush 30 mL  30 mL Intravenous Once PRN GINNY Rivers       • sodium chloride 0.9 % infusion  30 mL/hr Intravenous Continuous PRN GINNY Rivers       • Vasopressin 20 Units in sodium chloride 0.9 % 100 mL (0.2 Units/mL) infusion  0.02-0.1 Units/min Intravenous Continuous PRN GINNY Rivers         Facility-Administered Medications Ordered in Other Encounters   Medication Dose Route Frequency Provider Last Rate Last Dose   • Chlorhexidine Gluconate Cloth 2 % pads 1 application  1 application Topical Q12H PRN GINNY Rivers             Assessment/Plan     Principal Problem:    Three-vessel Coronary artery disease with 99% occluded stents to LAD, circumflex, RCA  Active Problems:    Unstable angina    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 No residual and no occlusive carotid artery disease      Progressing after CABG, okay to telemetry    Moshe Ahn MD  04/05/18  5:55pm

## 2018-04-06 NOTE — PROGRESS NOTES
Discharge Planning Assessment  [facility]     Patient Name: Kavon Waters Jr.  MRN: [id number]  Today's Date: 4/6/2018    Admit Date: 4/5/2018          Discharge Needs Assessment     Row Name 04/06/18 1142       Living Environment    Lives With spouse    Name(s) of Who Lives With Patient Mara Waters     Current Living Arrangements home/apartment/condo   Patient has no steps in the home.    Primary Care Provided by self    Provides Primary Care For no one    Family Caregiver if Needed spouse    Quality of Family Relationships supportive    Living Arrangement Comments Plan is home        Resource/Environmental Concerns    Resource/Environmental Concerns none    Transportation Concerns car, none       Transition Planning    Patient/Family Anticipates Transition to home with family    Patient/Family Anticipated Services at Transition none       Discharge Needs Assessment    Equipment Currently Used at Home none    Anticipated Changes Related to Illness none    Equipment Needed After Discharge none            Discharge Plan     Row Name 04/06/18 1143       Plan    Plan Home     Patient/Family in Agreement with Plan yes    Plan Comments Spoke with patient at bedside. Patient very sleepy throughout conversation. Patient lives in [place] with wife Mara. No DME in the home. Coverage for medications. Independent prior to hospitalization. CM to follow for d/c planning needs. Areli @ 0810         Destination     No service coordination in this encounter.      Durable Medical Equipment     No service coordination in this encounter.      Dialysis/Infusion     No service coordination in this encounter.      Home Medical Care     No service coordination in this encounter.      Social Care     No service coordination in this encounter.                Demographic Summary     Row Name 04/06/18 1140       General Information    Admission Type inpatient    Referral Source admission list    Reason for Consult discharge planning     Preferred Language English       Contact Information    Permission Granted to Share Info With     Contact Information Obtained for     Contact Information Comments PCP: Mina Story             Functional Status     Row Name 04/06/18 1141       Functional Status    Usual Activity Tolerance good       Functional Status, IADL    Medications independent    Meal Preparation independent    Housekeeping independent    Laundry independent    Shopping independent    IADL Comments Patient has coverage for medications             Psychosocial    No documentation.           Abuse/Neglect    No documentation.           Legal    No documentation.           Substance Abuse    No documentation.           Patient Forms    No documentation.         Reny Hale RN

## 2018-04-06 NOTE — PLAN OF CARE
Problem: Patient Care Overview  Goal: Plan of Care Review  Outcome: Ongoing (interventions implemented as appropriate)   04/06/18 7680   Coping/Psychosocial   Plan of Care Reviewed With patient   OTHER   Outcome Summary patient able to ambulate 100 ft with min assist and stable vitals anticipate patient going home at D/C

## 2018-04-06 NOTE — PROGRESS NOTES
"[facility] Progress Note     LOS: 1 day   Patient Care Team:  Mina Story MD as PCP - General    Chief Complaint:  No chief complaint on file.      Subjective     Interval History:     Extubated after bypass surgery, experiencing chest wall pain, no palpitations or shortness of breath     Review of Systems:   All systems were reviewed and negative except for:  Constitution:  positive for fatigue    Objective     Vital Sign Min/Max for last 24 hours  Temp  Min: 95.6 °F (35.3 °C)  Max: 101.7 °F (38.7 °C)   BP  Min: 92/61  Max: 152/78   Pulse  Min: 49  Max: 92   Resp  Min: 12  Max: 20   SpO2  Min: 93 %  Max: 100 %   Flow (L/min)  Min: 3  Max: 4   Weight  Min: 100 kg (220 lb 7.4 oz)  Max: 100 kg (221 lb)     Flowsheet Rows    Flowsheet Row First Filed Value   Admission Height 182.9 cm (72\") Documented at 04/05/2018 0618   Admission Weight 100 kg (221 lb) Documented at 04/05/2018 0618          Physical Exam:   General Appearance: alert, appears stated age and cooperative  Lungs: clear to auscultation, respirations regular, respirations even and respirations unlabored  Heart: regular rhythm & normal rate, normal S1, S2, no murmur, no evon, no rub and no click  Abdomen: normal bowel sounds, no masses, no hepatomegaly, no splenomegaly, soft non-tender, no guarding and no rebound tenderness  Extremities: moves extremities well, no edema, no cyanosis and no redness  Pulses: Pulses palpable and equal bilaterally  Skin: no bleeding, bruising or rash  Psych: normal     Results Review:     I reviewed the patient's new clinical results.    Results from last 7 days  Lab Units 04/06/18  0412 04/06/18  0015 04/05/18  1849   SODIUM mmol/L 135 135 136   POTASSIUM mmol/L 4.3 4.3 4.2   CHLORIDE mmol/L 101 102 103   CO2 mmol/L 26.0 24.0 25.0   BUN mg/dL 19 18 18   CREATININE mg/dL 1.70* 1.70* 1.60*   GLUCOSE mg/dL 125* 130* 129*   CALCIUM mg/dL 9.0 8.9 9.0       Results from last 7 days  Lab Units 04/06/18  0412 " 04/06/18  0015 04/05/18  1849   WBC 10*3/mm3 10.17 10.28 9.69   HEMOGLOBIN g/dL 9.8* 10.0* 9.9*   HEMATOCRIT % 28.9* 29.5* 29.6*   PLATELETS 10*3/mm3 178 179 176     No results found for: TROPONINT        Results from last 7 days  Lab Units 04/06/18  0412 04/05/18  1058 04/02/18  0956   INR  1.13* 1.21* 1.04       Results from last 7 days  Lab Units 04/05/18  1623   PH, ARTERIAL pH units 7.429   PO2 ART mm Hg 100.0   PCO2, ARTERIAL mm Hg 36.9   HCO3 ART mmol/L 24.4       Ejection Fraction  No results found for: EF    Echo EF Estimated  No results found for: ECHOEFEST    Nuclear Stress Ejection Fraction  No components found for: NUCEF    Cath Ejection Fraction Quantitative  No results found for: CATHEF    Physical Exam    Medication Review: yes  Current Facility-Administered Medications   Medication Dose Route Frequency Provider Last Rate Last Dose   • acetaminophen (TYLENOL) tablet 650 mg  650 mg Oral Q4H PRN Rakesh Mittal MD   650 mg at 04/05/18 2106   • albuterol (PROVENTIL) nebulizer solution 0.083% 2.5 mg/3mL  2.5 mg Nebulization Q4H PRN GINNY Rivers       • aspirin EC tablet 325 mg  325 mg Oral Daily GINNY Rivers       • atorvastatin (LIPITOR) tablet 40 mg  40 mg Oral Nightly GINNY Rivers   40 mg at 04/05/18 2055   • bisacodyl (DULCOLAX) EC tablet 10 mg  10 mg Oral Daily PRN GINNY Rivers       • bisacodyl (DULCOLAX) suppository 10 mg  10 mg Rectal Daily PRN GINNY Rivers       • calcium gluconate 1 g in sodium chloride 0.9 % 100 mL IVPB  1 g Intravenous Once PRN GINNY Rivers        And   • calcium gluconate 6 g in sodium chloride 0.9 % 500 mL IVPB  6 g Intravenous Once PRN GINNY Rivers       • calcium gluconate 2 g in sodium chloride 0.9 % 100 mL IVPB  2 g Intravenous Once PRN GINNY Rivers       • cefuroxime(ZINACEF) in SWFI 1500mg/20ml IV PUSH syringe  1.5 g Intravenous Q8H GINNY Rivers   1.5 g at 04/06/18 0103   • chlorhexidine  (PERIDEX) 0.12 % solution 15 mL  15 mL Mouth/Throat Q12H GINNY Rivers   15 mL at 04/05/18 2055   • dexmedetomidine (PRECEDEX) 400 mcg/100 mL (4 mcg/mL) infusion  0.2-1.5 mcg/kg/hr Intravenous Continuous PRN GINNY Rivers       • dextrose 5 % with KCl 20 mEq/L infusion  30 mL/hr Intravenous Continuous GINNY Rivers 30 mL/hr at 04/06/18 0103 30 mL/hr at 04/06/18 0103   • DOBUTamine (DOBUTREX) 1 mg/mL infusion  2-20 mcg/kg/min Intravenous Continuous PRN GINNY Rivers   Stopped at 04/06/18 0145   • docusate sodium (COLACE) capsule 100 mg  100 mg Oral BID PRN GINNY Rivers       • DOPamine 400 mg/250 mL (1.6 mg/mL) infusion  2-20 mcg/kg/min Intravenous Continuous PRN GINNY Rivers       • EPINEPHrine (ADRENALIN) 10,000 mcg in sodium chloride 0.9 % 250 mL (40 mcg/mL) infusion  0.02-0.3 mcg/kg/min Intravenous Continuous PRN GINNY Rivers       • fentaNYL citrate (PF) (SUBLIMAZE) injection 25 mcg  25 mcg Intravenous Q30 Min PRN Rakesh Mittal MD   25 mcg at 04/05/18 1749    And   • naloxone (NARCAN) injection 0.4 mg  0.4 mg Intravenous Q5 Min PRN Rakesh Mittal MD       • HYDROcodone-acetaminophen (NORCO) 7.5-325 MG per tablet 1 tablet  1 tablet Oral Q4H PRN Rakesh Mittal MD       • insulin regular (HumuLIN R,NovoLIN R) 100 Units in sodium chloride 0.9 % 100 mL (1 Units/mL) infusion  0-50 Units/hr Intravenous Continuous PRN GINNY Rivers 2.4 mL/hr at 04/06/18 0300 2.4 Units/hr at 04/06/18 0300   • levothyroxine (SYNTHROID, LEVOTHROID) tablet 56 mcg  56 mcg Oral Q AM GINNY Rivers   56 mcg at 04/06/18 0540   • liothyronine (CYTOMEL) tablet 5 mcg  5 mcg Oral Daily GINNY Rivers       • magnesium hydroxide (MILK OF MAGNESIA) suspension 2400 mg/10mL 10 mL  10 mL Oral Daily PRN GINNY Rievrs       • Magnesium Sulfate 2 gram Bolus, followed by 8 gram infusion (total Mg dose 10 grams)- Mg less than or equal to 1mg/dL  2 g Intravenous PRN  GINNY Rivers        Or   • Magnesium Sulfate 6 gram Infusion (2 gm x 3) -Mg 1.1 -1.5 mg/dL  2 g Intravenous PRN GINNY Rivers        Or   • magnesium sulfate 4 gram infusion- Mg 1.6-1.9 mg/dL  4 g Intravenous PRN GINNY Rivers       • metoprolol tartrate (LOPRESSOR) half tablet 12.5 mg  12.5 mg Oral Q12H GINNY Rivers       • metoprolol tartrate (LOPRESSOR) injection 2.5 mg  2.5 mg Intravenous Q6H GINNY Rivers       • Morphine sulfate (PF) injection 2 mg  2 mg Intravenous Q30 Min PRN Rakesh Mittal MD   2 mg at 04/06/18 0402   • niCARdipine (CARDENE-IV) 40 mg/200 mL (0.2 mg/mL) in 0.9% NaCl infusion  5-15 mg/hr Intravenous Continuous PRN GINNY Rivers       • nitroglycerin 50 mg/250 mL (0.2 mg/mL) infusion  5-200 mcg/min Intravenous Continuous PRN GINNY Rivers   Stopped at 04/06/18 0000   • norepinephrine (LEVOPHED) 8 mg/250 mL (32 mcg/mL) in sodium chloride 0.9% infusion (premix)  0.02-0.3 mcg/kg/min Intravenous Continuous PRN GINNY Rivers   Stopped at 04/05/18 1518   • ondansetron (ZOFRAN) injection 4 mg  4 mg Intravenous Q6H PRN GINNY Rivers   4 mg at 04/05/18 1856   • oxyCODONE-acetaminophen (PERCOCET) 5-325 MG per tablet 2 tablet  2 tablet Oral Q4H PRN Rakesh Mittal MD   2 tablet at 04/06/18 0403   • Pharmacy Consult - MTM   Does not apply Daily PHAM Melgoza RPH       • phenylephrine (MELONY-SYNEPHRINE) 50 mg/250 mL (0.2 mg/mL) in 0.9% NS  infusion  0.5-3 mcg/kg/min Intravenous Continuous PRN GINNY Rivers       • potassium chloride (MICRO-K) CR capsule 40 mEq  40 mEq Oral PRN GINNY Rivers        Or   • potassium chloride (KLOR-CON) packet 40 mEq  40 mEq Oral PRN GINNY Rivers       • potassium chloride 20 mEq in 50 mL IVPB  20 mEq Intravenous Q1H PRN GINNY Rivers        Or   • potassium chloride 20 mEq in 50 mL IVPB  20 mEq Intravenous Q1H PRN GINNY Rivers       • propofol (DIPRIVAN) infusion 10  mg/mL 100 mL  5-50 mcg/kg/min Intravenous Continuous PRN Rakesh Mittal MD   Stopped at 04/05/18 1518   • racemic epinephrine (RACEPINEPHRINE) nebulizer solution 0.5 mL  0.5 mL Nebulization Once PRN GINNY Rivers       • sennosides-docusate sodium (SENOKOT-S) 8.6-50 MG tablet 2 tablet  2 tablet Oral BID PRN GINNY Rivers       • sodium chloride 0.9 % flush 30 mL  30 mL Intravenous Once PRN GINNY Rivers       • sodium chloride 0.9 % infusion  30 mL/hr Intravenous Continuous PRN GINNY Rivers       • Vasopressin 20 Units in sodium chloride 0.9 % 100 mL (0.2 Units/mL) infusion  0.02-0.1 Units/min Intravenous Continuous PRN GINNY Rivers         Facility-Administered Medications Ordered in Other Encounters   Medication Dose Route Frequency Provider Last Rate Last Dose   • Chlorhexidine Gluconate Cloth 2 % pads 1 application  1 application Topical Q12H PRN GINNY Rivers             Assessment/Plan     Principal Problem:    Three-vessel Coronary artery disease with 99% occluded stents to LAD, circumflex, RCA  Active Problems:    Unstable angina    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 No residual and no occlusive carotid artery disease      Stable postop after CABG    Moshe Ahn MD  04/05/18  5:55pm

## 2018-04-06 NOTE — PROGRESS NOTES
"Intensivist Note     4/6/2018  Hospital Day: 1  1 Day Post-Op      Mr. Kavon Waters Jr., 67 y.o. male is followed for:  Principal Problem:    Three-vessel Coronary artery disease with 99% occluded stents to LAD, circumflex, RCA  Active Problems:    Unstable angina    S/P CABG x 2. 4/5/18    Hypertension    Hyperlipidemia    History of left corona radiata CVA 2015. No residual and no occlusive carotid artery disease    GERD (gastroesophageal reflux disease)    History of prostate Cancer s/p radiation therapy       SUBJECTIVE     Subjective    67-year-old white male remote heavy smoker with known CAD and a history of 7 stents previously placed. Additional risk factors of CAD includes smoking, hypertension, hyperlipidemia. Patient presented with 4 to 5 month history of chest pain that worsened with exertion and was relieved with rest. Underwent cardiac catheterization by Dr. Ahn 3/29/18 revealing severe three-vessel coronary disease with occluded stents to the LAD, circumflex, RCA. LV function was preserved. Was subsequently referred to Dr. Mittal for CABG.     Patient underwent an uneventful CABG ×2 on 4/5/18 and was extubated later that afternoon. He has done well overnight and today is ventilating well with adequate saturations on 3 L nasal oxygen. He is hemodynamically stable and not requiring any pressor support. He is having the usual amount of chest pain postop but is controllable with his pain medications. No significant arrhythmias have been noted. Urine output has been excellent and in fact he is net 1.5 L negative since surgery. In association with this however there has been an increase in his creatinine from 1.3 yesterday, to 1.7 today.    The patient's relevant past medical, surgical and social history were reviewed and updated in Epic as appropriate.    OBJECTIVE     BP 99/65   Pulse 61   Temp 98.9 °F (37.2 °C) (Core)   Resp (P) 20   Ht 182.9 cm (72\")   Wt 100 kg (220 lb 7.4 oz)   SpO2 95%  " " BMI 29.90 kg/m²   Flow (L/min): (P) 3    Flowsheet Rows    Flowsheet Row First Filed Value   Admission Height 182.9 cm (72\") Documented at 04/05/2018 0618   Admission Weight 100 kg (221 lb) Documented at 04/05/2018 0618        Intake & Output (last day)       04/05 0701 - 04/06 0700 04/06 0701 - 04/07 0700    P.O. 150     I.V. (mL/kg) 2683 (26.8)     Blood 569     IV Piggyback 40     Total Intake(mL/kg) 3442 (34.4)     Urine (mL/kg/hr) 4230 (1.8) 330 (0.4)    Emesis/NG output 20 (0)     Chest Tube 730 (0.3) 90 (0.1)    Total Output 4980 420    Net -1842 -499                Objective    Exam:  General Exam:  Older white male in NAD.  HEENT: Pupils equal and reactive. Nose and throat clear. Nasal oxygen in place.  Neck:                          Supple, no JVD, thyromegaly, or adenopathy. Right IJ Cordis in place  Lungs: Clear anteriorly and laterally  Chest:                          4 MT/CT tubes in place (730 mL out since surgery)  Cardiovascular: Regular rate and rhythm without murmurs or gallops. HR 70  Abdomen: Soft nontender without organomegaly or masses.   and rectal: Roa catheter in place  Extremities: No cyanosis or clubbing but trace lower extremity edema. Right radial art line  Neurologic:                 Symmetric strength. No focal deficits. Awake and alert    Chest X-Ray 4/6/18: Heart size at the upper limits of normal. Retrocardiac/left lower lobe infiltrate/atelectasis noted. Stomach slightly distended with air      Results from last 7 days  Lab Units 04/06/18  0412 04/06/18  0015 04/05/18  1849   WBC 10*3/mm3 10.17 10.28 9.69   HEMOGLOBIN g/dL 9.8* 10.0* 9.9*   HEMATOCRIT % 28.9* 29.5* 29.6*   PLATELETS 10*3/mm3 178 179 176       Results from last 7 days  Lab Units 04/06/18  0412 04/06/18  0015   SODIUM mmol/L 135 135   POTASSIUM mmol/L 4.3 4.3   CHLORIDE mmol/L 101 102   CO2 mmol/L 26.0 24.0   BUN mg/dL 19 18   CREATININE mg/dL 1.70* 1.70*   GLUCOSE mg/dL 125* 130*   CALCIUM mg/dL 9.0 8.9 "       Results from last 7 days  Lab Units 04/06/18  0412 04/06/18  0015 04/05/18  1849   MAGNESIUM mg/dL 2.0 1.9 2.1   PHOSPHORUS mg/dL 4.6 4.1 4.8       Results from last 7 days  Lab Units 04/02/18  0956   ALK PHOS U/L 75   BILIRUBIN mg/dL 0.6   ALT (SGPT) U/L 34   AST (SGOT) U/L 26       No results found for: SEDRATE  No results found for: BNP  No results found for: CKTOTAL, CKMB, CKMBINDEX, TROPONINI, TROPONINT  No results found for: TSH  No results found for: LACTATE  No results found for: CORTISOL      Results from last 7 days  Lab Units 04/05/18  1623 04/05/18  1605 04/05/18  1126 04/05/18  0948 04/05/18  0915   PH, ARTERIAL pH units 7.429 7.369 7.362 7.35 7.34*   PCO2, ARTERIAL mm Hg 36.9 44.6 44.2  --   --    PO2 ART mm Hg 100.0 110.0* 333.0*  --   --    HCO3 ART mmol/L 24.4 25.6 25.1  --   --    FIO2 % 35 35 100  --   --        I reviewed the patient's results, images and medication.    Assessment/Plan   ASSESSMENT      Principal Problem:    Three-vessel Coronary artery disease with 99% occluded stents to LAD, circumflex, RCA  Active Problems:    Unstable angina    S/P CABG x 2. 4/5/18    Hypertension    Hyperlipidemia    History of left corona radiata CVA 2015. No residual and no occlusive carotid artery disease    GERD (gastroesophageal reflux disease)    History of prostate Cancer s/p radiation therapy      DISCUSSION: Doing well postop. My only concern is the minimal infiltrate I see in the left lower lobe on chest x-ray and the bump in his creatinine. His MAXIMUM TEMPERATURE was 101.7, but white count is normal and I think his chest x-ray is most likely some atelectasis. He is still on perioperative cefuroxime so will just observe and follow-up chest x-ray in a.m. Regarding his creatinine, we just try to avoid any agents such as Toradol, and avoid volume depletion.    PLAN     1. Follow-up chest x-ray in a.m.  2. Complete Zinacef  3. Pulmonary toilet  4. If spikes a temp we will broaden antimicrobial  coverage to cover for hospital-acquired pneumonia  5. Avoid NSAID's and volume depletion  6. Follow-up renal function in a.m.    Plan of care and goals reviewed with mulitdisciplinary team at daily rounds.    I discussed the patient's findings and my recommendations with patient and nursing staff    Time spent Critical care 35 min (It does not include procedure time).    Rosendo Lopez MD  Intensive Care Medicine  04/06/18 3:35 PM

## 2018-04-07 ENCOUNTER — APPOINTMENT (OUTPATIENT)
Dept: GENERAL RADIOLOGY | Facility: HOSPITAL | Age: 68
End: 2018-04-07

## 2018-04-07 LAB
ANION GAP SERPL CALCULATED.3IONS-SCNC: 6 MMOL/L (ref 3–11)
BACTERIA SPEC AEROBE CULT: NORMAL
BUN BLD-MCNC: 24 MG/DL (ref 9–23)
BUN/CREAT SERPL: 16 (ref 7–25)
CALCIUM SPEC-SCNC: 9.2 MG/DL (ref 8.7–10.4)
CHLORIDE SERPL-SCNC: 100 MMOL/L (ref 99–109)
CO2 SERPL-SCNC: 28 MMOL/L (ref 20–31)
CREAT BLD-MCNC: 1.5 MG/DL (ref 0.6–1.3)
DEPRECATED RDW RBC AUTO: 45.8 FL (ref 37–54)
ERYTHROCYTE [DISTWIDTH] IN BLOOD BY AUTOMATED COUNT: 13.3 % (ref 11.3–14.5)
GFR SERPL CREATININE-BSD FRML MDRD: 47 ML/MIN/1.73
GLUCOSE BLD-MCNC: 131 MG/DL (ref 70–100)
GLUCOSE BLDC GLUCOMTR-MCNC: 112 MG/DL (ref 70–130)
GLUCOSE BLDC GLUCOMTR-MCNC: 125 MG/DL (ref 70–130)
GLUCOSE BLDC GLUCOMTR-MCNC: 143 MG/DL (ref 70–130)
GLUCOSE BLDC GLUCOMTR-MCNC: 156 MG/DL (ref 70–130)
HCT VFR BLD AUTO: 32.3 % (ref 38.9–50.9)
HGB BLD-MCNC: 10.8 G/DL (ref 13.1–17.5)
MAGNESIUM SERPL-MCNC: 2.1 MG/DL (ref 1.3–2.7)
MCH RBC QN AUTO: 31.5 PG (ref 27–31)
MCHC RBC AUTO-ENTMCNC: 33.4 G/DL (ref 32–36)
MCV RBC AUTO: 94.2 FL (ref 80–99)
PHOSPHATE SERPL-MCNC: 3.6 MG/DL (ref 2.4–5.1)
PLATELET # BLD AUTO: 166 10*3/MM3 (ref 150–450)
PMV BLD AUTO: 10.4 FL (ref 6–12)
POTASSIUM BLD-SCNC: 4.1 MMOL/L (ref 3.5–5.5)
RBC # BLD AUTO: 3.43 10*6/MM3 (ref 4.2–5.76)
SODIUM BLD-SCNC: 134 MMOL/L (ref 132–146)
WBC NRBC COR # BLD: 11.72 10*3/MM3 (ref 3.5–10.8)

## 2018-04-07 PROCEDURE — 97110 THERAPEUTIC EXERCISES: CPT

## 2018-04-07 PROCEDURE — 85027 COMPLETE CBC AUTOMATED: CPT | Performed by: PHYSICIAN ASSISTANT

## 2018-04-07 PROCEDURE — 63710000001 INSULIN LISPRO (HUMAN) PER 5 UNITS: Performed by: INTERNAL MEDICINE

## 2018-04-07 PROCEDURE — 93010 ELECTROCARDIOGRAM REPORT: CPT | Performed by: INTERNAL MEDICINE

## 2018-04-07 PROCEDURE — 83735 ASSAY OF MAGNESIUM: CPT | Performed by: INTERNAL MEDICINE

## 2018-04-07 PROCEDURE — 84100 ASSAY OF PHOSPHORUS: CPT | Performed by: INTERNAL MEDICINE

## 2018-04-07 PROCEDURE — 82962 GLUCOSE BLOOD TEST: CPT

## 2018-04-07 PROCEDURE — 80048 BASIC METABOLIC PNL TOTAL CA: CPT | Performed by: PHYSICIAN ASSISTANT

## 2018-04-07 PROCEDURE — 25010000002 CEFUROXIME PER 750 MG: Performed by: PHYSICIAN ASSISTANT

## 2018-04-07 PROCEDURE — 71045 X-RAY EXAM CHEST 1 VIEW: CPT

## 2018-04-07 PROCEDURE — 93005 ELECTROCARDIOGRAM TRACING: CPT | Performed by: THORACIC SURGERY (CARDIOTHORACIC VASCULAR SURGERY)

## 2018-04-07 PROCEDURE — 99024 POSTOP FOLLOW-UP VISIT: CPT | Performed by: THORACIC SURGERY (CARDIOTHORACIC VASCULAR SURGERY)

## 2018-04-07 PROCEDURE — 99232 SBSQ HOSP IP/OBS MODERATE 35: CPT | Performed by: INTERNAL MEDICINE

## 2018-04-07 PROCEDURE — 93005 ELECTROCARDIOGRAM TRACING: CPT | Performed by: PHYSICIAN ASSISTANT

## 2018-04-07 PROCEDURE — 94799 UNLISTED PULMONARY SVC/PX: CPT

## 2018-04-07 PROCEDURE — 25010000002 ONDANSETRON PER 1 MG: Performed by: PHYSICIAN ASSISTANT

## 2018-04-07 RX ORDER — CLOPIDOGREL BISULFATE 75 MG/1
75 TABLET ORAL DAILY
Status: DISCONTINUED | OUTPATIENT
Start: 2018-04-07 | End: 2018-04-12 | Stop reason: HOSPADM

## 2018-04-07 RX ADMIN — OXYCODONE HYDROCHLORIDE AND ACETAMINOPHEN 2 TABLET: 5; 325 TABLET ORAL at 17:45

## 2018-04-07 RX ADMIN — WATER 1.5 G: 1 INJECTION INTRAMUSCULAR; INTRAVENOUS; SUBCUTANEOUS at 00:31

## 2018-04-07 RX ADMIN — LEVOTHYROXINE SODIUM 50 MCG: 50 TABLET ORAL at 05:08

## 2018-04-07 RX ADMIN — FAMOTIDINE 20 MG: 20 TABLET, FILM COATED ORAL at 16:24

## 2018-04-07 RX ADMIN — CLOPIDOGREL BISULFATE 75 MG: 75 TABLET ORAL at 10:19

## 2018-04-07 RX ADMIN — ASPIRIN 325 MG: 325 TABLET, DELAYED RELEASE ORAL at 08:10

## 2018-04-07 RX ADMIN — LIOTHYRONINE SODIUM 5 MCG: 5 TABLET ORAL at 08:10

## 2018-04-07 RX ADMIN — ATORVASTATIN CALCIUM 40 MG: 40 TABLET, FILM COATED ORAL at 21:13

## 2018-04-07 RX ADMIN — ONDANSETRON 4 MG: 2 INJECTION INTRAMUSCULAR; INTRAVENOUS at 08:50

## 2018-04-07 RX ADMIN — LIOTHYRONINE SODIUM 5 MCG: 5 TABLET ORAL at 21:13

## 2018-04-07 RX ADMIN — METOPROLOL TARTRATE 12.5 MG: 25 TABLET, FILM COATED ORAL at 21:13

## 2018-04-07 RX ADMIN — BISACODYL 10 MG: 5 TABLET, COATED ORAL at 21:23

## 2018-04-07 RX ADMIN — OXYCODONE HYDROCHLORIDE AND ACETAMINOPHEN 2 TABLET: 5; 325 TABLET ORAL at 00:24

## 2018-04-07 RX ADMIN — METOPROLOL TARTRATE 12.5 MG: 25 TABLET, FILM COATED ORAL at 08:11

## 2018-04-07 RX ADMIN — OXYCODONE HYDROCHLORIDE AND ACETAMINOPHEN 2 TABLET: 5; 325 TABLET ORAL at 04:48

## 2018-04-07 NOTE — PROGRESS NOTES
[facility]  Progress Note       LOS: 2 days   Patient Care Team:  Mina Story MD as PCP - General    Chief Complaint:  Chest Pain    Subjective feels improved today.  Less chest pain.  No substantial shortness of breath.  No new cardiovascular complaints    Interval History:   Uneventful      Review of Systems:   Pertinent positives in HPI, all others reviewed and negative.      Objective       Current Facility-Administered Medications:   •  acetaminophen (TYLENOL) tablet 650 mg, 650 mg, Oral, Q4H PRN, Rakesh Mittal MD, 650 mg at 04/05/18 2106  •  aspirin EC tablet 325 mg, 325 mg, Oral, Daily, GINNY Rivers, 325 mg at 04/07/18 0810  •  atorvastatin (LIPITOR) tablet 40 mg, 40 mg, Oral, Nightly, GINNY Rivers, 40 mg at 04/06/18 2030  •  bisacodyl (DULCOLAX) EC tablet 10 mg, 10 mg, Oral, Daily PRN, GINNY Rivers  •  bisacodyl (DULCOLAX) suppository 10 mg, 10 mg, Rectal, Daily PRN, GINNY Rivers  •  clopidogrel (PLAVIX) tablet 75 mg, 75 mg, Oral, Daily, Francisco Demarco PA-C, 75 mg at 04/07/18 1019  •  dextrose (D50W) solution 25 g, 25 g, Intravenous, Q15 Min PRN, Rosendo Lopez MD  •  docusate sodium (COLACE) capsule 100 mg, 100 mg, Oral, BID PRN, GINNY Rivers  •  famotidine (PEPCID) tablet 20 mg, 20 mg, Oral, BID PRN, Rosendo Lopez MD  •  fentaNYL citrate (PF) (SUBLIMAZE) injection 25 mcg, 25 mcg, Intravenous, Q30 Min PRN, 25 mcg at 04/05/18 1749 **AND** naloxone (NARCAN) injection 0.4 mg, 0.4 mg, Intravenous, Q5 Min PRN, Rakseh Mittal MD  •  HYDROcodone-acetaminophen (NORCO) 7.5-325 MG per tablet 1 tablet, 1 tablet, Oral, Q4H PRN, Rakesh Mittal MD, 1 tablet at 04/06/18 1525  •  insulin lispro (humaLOG) injection 0-7 Units, 0-7 Units, Subcutaneous, 4x Daily With Meals & Nightly, Rosendo Lopez MD  •  levothyroxine (SYNTHROID, LEVOTHROID) tablet 50 mcg, 50 mcg, Oral, Q AM, Rosendo Lopez MD, 50 mcg at 04/07/18 0508  •   liothyronine (CYTOMEL) tablet 5 mcg, 5 mcg, Oral, BID, Rosendo Lopez MD, 5 mcg at 04/07/18 0810  •  magnesium hydroxide (MILK OF MAGNESIA) suspension 2400 mg/10mL 10 mL, 10 mL, Oral, Daily PRN, GINNY Rivers  •  Magnesium Sulfate 2 gram Bolus, followed by 8 gram infusion (total Mg dose 10 grams)- Mg less than or equal to 1mg/dL, 2 g, Intravenous, PRN **OR** Magnesium Sulfate 6 gram Infusion (2 gm x 3) -Mg 1.1 -1.5 mg/dL, 2 g, Intravenous, PRN **OR** magnesium sulfate 4 gram infusion- Mg 1.6-1.9 mg/dL, 4 g, Intravenous, PRN, GINNY Rivers  •  metoprolol tartrate (LOPRESSOR) half tablet 12.5 mg, 12.5 mg, Oral, Q12H, GINNY Rivers, 12.5 mg at 04/07/18 0811  •  Morphine sulfate (PF) injection 2 mg, 2 mg, Intravenous, Q30 Min PRN, Rakesh Mittal MD, 2 mg at 04/06/18 2258  •  ondansetron (ZOFRAN) injection 4 mg, 4 mg, Intravenous, Q6H PRN, GINNY Rivers, 4 mg at 04/07/18 0850  •  oxyCODONE-acetaminophen (PERCOCET) 5-325 MG per tablet 2 tablet, 2 tablet, Oral, Q4H PRN, Rakesh Mittal MD, 2 tablet at 04/07/18 0448  •  Pharmacy Consult - [facility], , Does not apply, Daily, PHAM Melgoza, [facility]  •  potassium chloride (MICRO-K) CR capsule 40 mEq, 40 mEq, Oral, PRN **OR** potassium chloride (KLOR-CON) packet 40 mEq, 40 mEq, Oral, PRN, GINNY Rivers  •  potassium chloride 20 mEq in 50 mL IVPB, 20 mEq, Intravenous, Q1H PRN **OR** potassium chloride 20 mEq in 50 mL IVPB, 20 mEq, Intravenous, Q1H PRN, GINNY Rivers  •  sennosides-docusate sodium (SENOKOT-S) 8.6-50 MG tablet 2 tablet, 2 tablet, Oral, BID PRN, GINNY Rivers, 2 tablet at 04/06/18 0852    Facility-Administered Medications Ordered in Other Encounters:   •  Chlorhexidine Gluconate Cloth 2 % pads 1 application, 1 application, Topical, Q12H PRN, GINNY Rivers    Vital Sign Min/Max for last 24 hours  Temp  Min: 98 °F (36.7 °C)  Max: 98.4 °F (36.9 °C)   BP  Min: 94/58  Max: 129/64   Pulse  Min: 65  Max: 78   Resp   "Min: 17  Max: 22   SpO2  Min: 89 %  Max: 97 %   Flow (L/min)  Min: 3  Max: 4   No Data Recorded     Flowsheet Rows    Flowsheet Row First Filed Value   Admission Height 182.9 cm (72\") Documented at 04/05/2018 0618   Admission Weight 100 kg (221 lb) Documented at 04/05/2018 0618            Intake/Output Summary (Last 24 hours) at 04/07/18 1253  Last data filed at 04/07/18 1000   Gross per 24 hour   Intake              250 ml   Output             1020 ml   Net             -770 ml       Physical Exam:     General Appearance:    Alert, cooperative, in no acute distress   Lungs:     Clear to auscultation anteriorly     Heart:    Regular rate and rhythm normal S1 and S2 there is an S4 no murmurs are appreciated    Chest Wall:    No abnormalities observed   Abdomen:     Normal bowel sounds, no masses, no organomegaly, soft        non-tender, non-distended, no guarding, no rebound                tenderness   Extremities:   Moves all extremities well, Trace edema at both feet, no cyanosis, no            redness   Pulses:   Pulses palpable and equal bilaterally   Skin:   No bleeding, bruising or rash        Results Review:     Results from last 7 days  Lab Units 04/07/18  0319 04/06/18  0412 04/06/18  0015   WBC 10*3/mm3 11.72* 10.17 10.28   HEMOGLOBIN g/dL 10.8* 9.8* 10.0*   HEMATOCRIT % 32.3* 28.9* 29.5*   PLATELETS 10*3/mm3 166 178 179       Results from last 7 days  Lab Units 04/07/18  0319 04/06/18  0412 04/06/18  0015   SODIUM mmol/L 134 135 135   POTASSIUM mmol/L 4.1 4.3 4.3   CHLORIDE mmol/L 100 101 102   CO2 mmol/L 28.0 26.0 24.0   BUN mg/dL 24* 19 18   CREATININE mg/dL 1.50* 1.70* 1.70*   GLUCOSE mg/dL 131* 125* 130*        Results from last 7 days  Lab Units 04/02/18  0956   HEMOGLOBIN A1C % 5.80*                   Results from last 7 days  Lab Units 04/06/18  0412 04/05/18  1058 04/02/18  0956   PROTIME Seconds 11.9* 12.7* 10.9   INR  1.13* 1.21* 1.04   APTT seconds  --  30.1 33.2*           Intake/Output Summary " (Last 24 hours) at 04/07/18 1253  Last data filed at 04/07/18 1000   Gross per 24 hour   Intake              250 ml   Output             1020 ml   Net             -770 ml       I personally viewed and interpreted the patient's EKG/Telemetry data    EKG: Sinus rhythm heart rate 70 left axis deviation, right bundle branch block and first-degree AV block NM interval 200 ms    Telemetry: Normal sinus rhythm with heart rates varying from C5 to 80 bpm.    Ejection Fraction  No results found for: EF    Echo EF Estimated  No results found for: ECHOEFEST      Present on Admission:  • Unstable angina  • Three-vessel Coronary artery disease with 99% occluded stents to LAD, circumflex, RCA  • Hypertension  • Hyperlipidemia  • GERD (gastroesophageal reflux disease)  • History of prostate Cancer s/p radiation therapy  • History of left corona radiata CVA 2015. No residual and no occlusive carotid artery disease    Assessment/Plan   1) hypertensive heart disease: Controlled on current medical therapy  2) hyperlipidemia: On appropriate medications  3) CAD status post coronary bypass and graft surgery with unstable angina 4/5/2018  4) previous CVA   5) renal insufficiency    Okay to telemetry with me    Abran Major MD  04/07/18  12:53 PM

## 2018-04-07 NOTE — PLAN OF CARE
Problem: Patient Care Overview  Goal: Plan of Care Review  Outcome: Ongoing (interventions implemented as appropriate)   04/07/18 1025   Coping/Psychosocial   Plan of Care Reviewed With patient   Plan of Care Review   Progress no change   OTHER   Outcome Summary Pt ambulated 100ft with Susi x2. Pt demonstrated slow kennedy with decreased step length and wide SARMAD. Pt declined additional ambulation despite encouragement. Mobility limited by fatigue and c/o nausea.

## 2018-04-07 NOTE — PROGRESS NOTES
"Adult Nutrition  Assessment/PES    Patient Name:  Kavon Waters Jr.  YOB: 1950  MRN: [id number]  Admit Date:  4/5/2018    Assessment Date:  4/6/2018    Comments:            Adult Nutrition Assessment     Row Name 04/06/18 2118       Labs/Procedures/Meds    Lab Results Reviewed reviewed       Nutrition Prescription PO    Current PO Diet Clear Liquid       PO Evaluation    Number of Meals 3    % PO Intake 8   just beginning to drink per RN    Row Name 04/06/18 2117       Reason for Assessment    Reason For Assessment per organizational policy   MDR 15 min    Diagnosis cancer diagnosis/related complications;cardiac disease;diabetes diagnosis/complications;endocrine conditions;neurologic conditions   s/p CABG, PAD, HTN, HLP; \"borderline\" DM; hypothyroid; GERD; Hx Prostate CA radiation; CVA in 2015       Nutrition/Diet History    Factors Affecting Nutritional Intake other (see comments)   Per RN progressing s/p CABG          Problem/Interventions:                    Nutrition Intervention     Row Name 04/06/18 2119       Nutrition Intervention    RD/Tech Action Care plan reviewd              Education/Evaluation     Row Name 04/06/18 2119       Monitor/Evaluation    Monitor Per protocol        Electronically signed by:  Debora Brown RD  04/06/18 9:21 PM  "

## 2018-04-07 NOTE — PROGRESS NOTES
CTS Progress Note       LOS: 2 days   Patient Care Team:  Mina Story MD as PCP - General    Chief complaint chest pain    Vital Signs:  Temp:  [98 °F (36.7 °C)-98.4 °F (36.9 °C)] 98 °F (36.7 °C)  Heart Rate:  [58-78] 68  Resp:  [17-22] 18  BP: ()/(55-76) 94/58  Arterial Line BP: (127)/(67) 127/67    Physical Exam: Up in chair neurologically intact breathing unlabored       Results:     Results from last 7 days  Lab Units 04/07/18  0319   WBC 10*3/mm3 11.72*   HEMOGLOBIN g/dL 10.8*   HEMATOCRIT % 32.3*   PLATELETS 10*3/mm3 166       Results from last 7 days  Lab Units 04/07/18  0319   SODIUM mmol/L 134   POTASSIUM mmol/L 4.1   CHLORIDE mmol/L 100   CO2 mmol/L 28.0   BUN mg/dL 24*   CREATININE mg/dL 1.50*   GLUCOSE mg/dL 131*   CALCIUM mg/dL 9.2           Imaging Results (last 24 hours)     Procedure Component Value Units Date/Time    XR Chest 1 View [[id number]] Collected:  04/07/18 0743     Updated:  04/07/18 0744    Narrative:          EXAMINATION: XR CHEST 1 VW-      INDICATION: Post-Op Heart Surgery; Z74.09-Other reduced mobility;  I25.118-Atherosclerotic heart disease of native coronary artery with  other forms of angina pectoris      COMPARISON: 04/06/2018     FINDINGS: Interval removal of right internal jugular pulmonary arterial  catheter with sheath remaining in place. Support hardware otherwise  unchanged. Increase in central pulmonary vascularity as well as  ill-defined opacifications left lung base blunting the left lateral  costophrenic sulcus similar to prior. No new parenchymal disease. No  pneumothorax with probable trace to small left pleural effusion similar  to prior.           Impression:       Interval removal of pulmonary arterial catheter with right  internal jugular sheath remaining in place. No significant change in  pulmonary findings otherwise.          XR Chest 1 View [[id number]] Collected:  04/06/18 0959     Updated:  04/06/18 2238    Narrative:       EXAMINATION: XR CHEST 1  VW-04/06/2018:      INDICATION: Post-Op Heart Surgery; I25.118-Atherosclerotic heart disease  of native coronary artery with other forms of angina pectoris.      COMPARISON: 04/05/2018.     FINDINGS: ET tube and NG tube appear to be in good position. PA catheter  lies in the main pulmonary segment. The heart is enlarged. The  vasculature is slightly cephalized. There is mild patchy disease in the  left base unchanged. No pneumothorax or effusion is seen.           Impression:       Post-extubation chest radiograph with stable, mild, patchy  disease in the left lung base. No new chest pathology is seen.     D:  04/06/2018  E:  04/06/2018     This report was finalized on 4/6/2018 10:36 PM by DR. Allen Stinson MD.             Assessment    Principal Problem:    Three-vessel Coronary artery disease with 99% occluded stents to LAD, circumflex, RCA  Active Problems:    Unstable angina    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 No residual and no occlusive carotid artery disease        Plan   Discontinue chest tubes and pacer wires Plavix 75 mg today and daily,, transfer to telemetry    Please note that portions of this note were completed with a voice recognition program. Efforts were made to edit the dictations, but occasionally words are mistranscribed.    Rakesh Mittal MD  04/07/18  8:08 AM

## 2018-04-08 LAB
ANION GAP SERPL CALCULATED.3IONS-SCNC: 6 MMOL/L (ref 3–11)
BUN BLD-MCNC: 29 MG/DL (ref 9–23)
BUN/CREAT SERPL: 22.3 (ref 7–25)
CALCIUM SPEC-SCNC: 9 MG/DL (ref 8.7–10.4)
CHLORIDE SERPL-SCNC: 101 MMOL/L (ref 99–109)
CO2 SERPL-SCNC: 26 MMOL/L (ref 20–31)
CREAT BLD-MCNC: 1.3 MG/DL (ref 0.6–1.3)
DEPRECATED RDW RBC AUTO: 44.2 FL (ref 37–54)
ERYTHROCYTE [DISTWIDTH] IN BLOOD BY AUTOMATED COUNT: 12.9 % (ref 11.3–14.5)
GFR SERPL CREATININE-BSD FRML MDRD: 55 ML/MIN/1.73
GLUCOSE BLD-MCNC: 114 MG/DL (ref 70–100)
GLUCOSE BLDC GLUCOMTR-MCNC: 124 MG/DL (ref 70–130)
GLUCOSE BLDC GLUCOMTR-MCNC: 129 MG/DL (ref 70–130)
GLUCOSE BLDC GLUCOMTR-MCNC: 141 MG/DL (ref 70–130)
GLUCOSE BLDC GLUCOMTR-MCNC: 231 MG/DL (ref 70–130)
HCT VFR BLD AUTO: 29.6 % (ref 38.9–50.9)
HGB BLD-MCNC: 9.8 G/DL (ref 13.1–17.5)
MCH RBC QN AUTO: 30.7 PG (ref 27–31)
MCHC RBC AUTO-ENTMCNC: 33.1 G/DL (ref 32–36)
MCV RBC AUTO: 92.8 FL (ref 80–99)
PLATELET # BLD AUTO: 168 10*3/MM3 (ref 150–450)
PMV BLD AUTO: 10.4 FL (ref 6–12)
POTASSIUM BLD-SCNC: 3.9 MMOL/L (ref 3.5–5.5)
RBC # BLD AUTO: 3.19 10*6/MM3 (ref 4.2–5.76)
SODIUM BLD-SCNC: 133 MMOL/L (ref 132–146)
WBC NRBC COR # BLD: 8.88 10*3/MM3 (ref 3.5–10.8)

## 2018-04-08 PROCEDURE — 94799 UNLISTED PULMONARY SVC/PX: CPT

## 2018-04-08 PROCEDURE — 25010000002 AMIODARONE IN DEXTROSE 5% 150-4.21 MG/100ML-% SOLUTION: Performed by: NURSE PRACTITIONER

## 2018-04-08 PROCEDURE — 82962 GLUCOSE BLOOD TEST: CPT

## 2018-04-08 PROCEDURE — 99024 POSTOP FOLLOW-UP VISIT: CPT | Performed by: THORACIC SURGERY (CARDIOTHORACIC VASCULAR SURGERY)

## 2018-04-08 PROCEDURE — 93010 ELECTROCARDIOGRAM REPORT: CPT | Performed by: INTERNAL MEDICINE

## 2018-04-08 PROCEDURE — 80048 BASIC METABOLIC PNL TOTAL CA: CPT | Performed by: PHYSICIAN ASSISTANT

## 2018-04-08 PROCEDURE — 93005 ELECTROCARDIOGRAM TRACING: CPT | Performed by: NURSE PRACTITIONER

## 2018-04-08 PROCEDURE — 25010000002 AMIODARONE IN DEXTROSE 5% 360-4.14 MG/200ML-% SOLUTION: Performed by: NURSE PRACTITIONER

## 2018-04-08 PROCEDURE — 85027 COMPLETE CBC AUTOMATED: CPT | Performed by: PHYSICIAN ASSISTANT

## 2018-04-08 PROCEDURE — 99232 SBSQ HOSP IP/OBS MODERATE 35: CPT | Performed by: INTERNAL MEDICINE

## 2018-04-08 RX ORDER — AMIODARONE HYDROCHLORIDE 200 MG/1
200 TABLET ORAL DAILY
Status: DISCONTINUED | OUTPATIENT
Start: 2018-04-30 | End: 2018-04-11

## 2018-04-08 RX ORDER — AMIODARONE HYDROCHLORIDE 200 MG/1
200 TABLET ORAL EVERY 12 HOURS
Status: DISCONTINUED | OUTPATIENT
Start: 2018-04-16 | End: 2018-04-11

## 2018-04-08 RX ORDER — AMIODARONE HYDROCHLORIDE 200 MG/1
200 TABLET ORAL ONCE
Status: COMPLETED | OUTPATIENT
Start: 2018-04-09 | End: 2018-04-09

## 2018-04-08 RX ORDER — AMIODARONE HYDROCHLORIDE 200 MG/1
200 TABLET ORAL EVERY 8 HOURS
Status: DISCONTINUED | OUTPATIENT
Start: 2018-04-09 | End: 2018-04-11

## 2018-04-08 RX ORDER — ASPIRIN 81 MG/1
81 TABLET ORAL DAILY
Status: DISCONTINUED | OUTPATIENT
Start: 2018-04-09 | End: 2018-04-12 | Stop reason: HOSPADM

## 2018-04-08 RX ADMIN — MAGNESIUM HYDROXIDE 10 ML: 2400 SUSPENSION ORAL at 15:43

## 2018-04-08 RX ADMIN — LIOTHYRONINE SODIUM 5 MCG: 5 TABLET ORAL at 21:10

## 2018-04-08 RX ADMIN — INSULIN LISPRO 3 UNITS: 100 INJECTION, SOLUTION INTRAVENOUS; SUBCUTANEOUS at 13:06

## 2018-04-08 RX ADMIN — METOPROLOL TARTRATE 12.5 MG: 25 TABLET, FILM COATED ORAL at 21:10

## 2018-04-08 RX ADMIN — AMIODARONE HYDROCHLORIDE 150 MG: 1.5 INJECTION, SOLUTION INTRAVENOUS at 09:38

## 2018-04-08 RX ADMIN — APIXABAN 5 MG: 5 TABLET, FILM COATED ORAL at 21:09

## 2018-04-08 RX ADMIN — ASPIRIN 325 MG: 325 TABLET, DELAYED RELEASE ORAL at 08:39

## 2018-04-08 RX ADMIN — AMIODARONE HYDROCHLORIDE 0.5 MG/MIN: 1.8 INJECTION, SOLUTION INTRAVENOUS at 15:38

## 2018-04-08 RX ADMIN — METOPROLOL TARTRATE 12.5 MG: 25 TABLET, FILM COATED ORAL at 08:39

## 2018-04-08 RX ADMIN — APIXABAN 5 MG: 5 TABLET, FILM COATED ORAL at 13:06

## 2018-04-08 RX ADMIN — BISACODYL 10 MG: 5 TABLET, COATED ORAL at 15:43

## 2018-04-08 RX ADMIN — AMIODARONE HYDROCHLORIDE 1 MG/MIN: 1.8 INJECTION, SOLUTION INTRAVENOUS at 09:49

## 2018-04-08 RX ADMIN — CLOPIDOGREL BISULFATE 75 MG: 75 TABLET ORAL at 08:39

## 2018-04-08 RX ADMIN — LEVOTHYROXINE SODIUM 50 MCG: 50 TABLET ORAL at 06:00

## 2018-04-08 RX ADMIN — LIOTHYRONINE SODIUM 5 MCG: 5 TABLET ORAL at 08:39

## 2018-04-08 NOTE — PROGRESS NOTES
[facility]  Progress Note       LOS: 3 days   Patient Care Team:  Mina Story MD as PCP - General    Chief Complaint:  CP, afib    Subjective     Interval History: Patient POD #3 from CABG, had afib this morning and initiated on amiodarone protocol.  States hx of afib x 25 years.  Symptoms include weakness and fatigue.  Mild incisional pain.  No SOB just tired        Review of Systems:   Pertinent positives in HPI, all others reviewed and negative.      Objective       Current Facility-Administered Medications:   •  acetaminophen (TYLENOL) tablet 650 mg, 650 mg, Oral, Q4H PRN, Rakesh Mittal MD, 650 mg at 04/05/18 2106  •  [COMPLETED] amiodarone in dextrose 5% (NEXTERONE) loading dose 150mg/100mL, 150 mg, Intravenous, Once, 150 mg at 04/08/18 0938 **FOLLOWED BY** amiodarone (NEXTERONE) 360 mg/200 mL (1.8 mg/mL) infusion, 1 mg/min, Intravenous, Continuous, Last Rate: 33.3 mL/hr at 04/08/18 0949, 1 mg/min at 04/08/18 0949 **FOLLOWED BY** amiodarone (NEXTERONE) 360 mg/200 mL (1.8 mg/mL) infusion, 0.5 mg/min, Intravenous, Continuous **FOLLOWED BY** [START ON 4/9/2018] amiodarone (PACERONE) tablet 200 mg, 200 mg, Oral, Once **FOLLOWED BY** [START ON 4/9/2018] amiodarone (PACERONE) tablet 200 mg, 200 mg, Oral, Q8H **FOLLOWED BY** [START ON 4/16/2018] amiodarone (PACERONE) tablet 200 mg, 200 mg, Oral, Q12H **FOLLOWED BY** [START ON 4/30/2018] amiodarone (PACERONE) tablet 200 mg, 200 mg, Oral, Daily, Pina Burden, APRN  •  aspirin EC tablet 325 mg, 325 mg, Oral, Daily, GINNY Rivers, 325 mg at 04/08/18 0839  •  atorvastatin (LIPITOR) tablet 40 mg, 40 mg, Oral, Nightly, GINNY Rivers, 40 mg at 04/07/18 2113  •  bisacodyl (DULCOLAX) EC tablet 10 mg, 10 mg, Oral, Daily PRN, GINNY Rivers, 10 mg at 04/07/18 2123  •  bisacodyl (DULCOLAX) suppository 10 mg, 10 mg, Rectal, Daily PRN, GINNY Rivers  •  clopidogrel (PLAVIX) tablet 75 mg, 75 mg,  Oral, Daily, Francisco Demarco PA-C, 75 mg at 04/08/18 0839  •  dextrose (D50W) solution 25 g, 25 g, Intravenous, Q15 Min PRN, Rosendo Lopez MD  •  docusate sodium (COLACE) capsule 100 mg, 100 mg, Oral, BID PRN, GINNY Rivers  •  famotidine (PEPCID) tablet 20 mg, 20 mg, Oral, BID PRN, Rosendo Lopez MD, 20 mg at 04/07/18 1624  •  fentaNYL citrate (PF) (SUBLIMAZE) injection 25 mcg, 25 mcg, Intravenous, Q30 Min PRN, 25 mcg at 04/05/18 1749 **AND** naloxone (NARCAN) injection 0.4 mg, 0.4 mg, Intravenous, Q5 Min PRN, Rakesh Mittal MD  •  HYDROcodone-acetaminophen (NORCO) 7.5-325 MG per tablet 1 tablet, 1 tablet, Oral, Q4H PRN, Rakesh Mittal MD, 1 tablet at 04/06/18 1525  •  insulin lispro (humaLOG) injection 0-7 Units, 0-7 Units, Subcutaneous, 4x Daily With Meals & Nightly, Rosendo Lopez MD  •  levothyroxine (SYNTHROID, LEVOTHROID) tablet 50 mcg, 50 mcg, Oral, Q AM, Rosendo Lopez MD, 50 mcg at 04/08/18 0600  •  liothyronine (CYTOMEL) tablet 5 mcg, 5 mcg, Oral, BID, Rosendo Lopez MD, 5 mcg at 04/08/18 0839  •  magnesium hydroxide (MILK OF MAGNESIA) suspension 2400 mg/10mL 10 mL, 10 mL, Oral, Daily PRN, GINNY Rivers  •  Magnesium Sulfate 2 gram Bolus, followed by 8 gram infusion (total Mg dose 10 grams)- Mg less than or equal to 1mg/dL, 2 g, Intravenous, PRN **OR** Magnesium Sulfate 6 gram Infusion (2 gm x 3) -Mg 1.1 -1.5 mg/dL, 2 g, Intravenous, PRN **OR** magnesium sulfate 4 gram infusion- Mg 1.6-1.9 mg/dL, 4 g, Intravenous, PRN, GINNY Rivers  •  metoprolol tartrate (LOPRESSOR) half tablet 12.5 mg, 12.5 mg, Oral, Q12H, GINNY Rivers, 12.5 mg at 04/08/18 0839  •  Morphine sulfate (PF) injection 2 mg, 2 mg, Intravenous, Q30 Min PRN, Rakesh Mittal MD, 2 mg at 04/06/18 2257  •  ondansetron (ZOFRAN) injection 4 mg, 4 mg, Intravenous, Q6H PRN, GINNY Rivers, 4 mg at 04/07/18 0850  •  oxyCODONE-acetaminophen (PERCOCET) 5-325 MG per tablet 2 tablet, 2 tablet,  "Oral, Q4H PRN, Rakesh Mittal MD, 2 tablet at 04/07/18 1745  •  Pharmacy Consult - [facility], , Does not apply, Daily, PHAM Melgoza [facility]  •  potassium chloride (MICRO-K) CR capsule 40 mEq, 40 mEq, Oral, PRN **OR** potassium chloride (KLOR-CON) packet 40 mEq, 40 mEq, Oral, PRN, GINNY Rivers  •  potassium chloride 20 mEq in 50 mL IVPB, 20 mEq, Intravenous, Q1H PRN **OR** potassium chloride 20 mEq in 50 mL IVPB, 20 mEq, Intravenous, Q1H PRN, GINNY Rivers  •  sennosides-docusate sodium (SENOKOT-S) 8.6-50 MG tablet 2 tablet, 2 tablet, Oral, BID PRN, GINNY Rivers, 2 tablet at 04/06/18 0852    Facility-Administered Medications Ordered in Other Encounters:   •  Chlorhexidine Gluconate Cloth 2 % pads 1 application, 1 application, Topical, Q12H PRN, GINNY Rivers    Vital Sign Min/Max for last 24 hours  Temp  Min: 97.7 °F (36.5 °C)  Max: 98.8 °F (37.1 °C)   BP  Min: 105/85  Max: 138/71   Pulse  Min: 66  Max: 161   Resp  Min: 18  Max: 21   SpO2  Min: 92 %  Max: 98 %   Flow (L/min)  Min: 3  Max: 3   Weight  Min: 98.2 kg (216 lb 6.4 oz)  Max: 98.2 kg (216 lb 6.4 oz)     Flowsheet Rows    Flowsheet Row First Filed Value   Admission Height 182.9 cm (72\") Documented at 04/05/2018 0618   Admission Weight 100 kg (221 lb) Documented at 04/05/2018 0618            Intake/Output Summary (Last 24 hours) at 04/08/18 1121  Last data filed at 04/08/18 0636   Gross per 24 hour   Intake             1340 ml   Output              720 ml   Net              620 ml       Physical Exam:     General Appearance:    Alert, cooperative, in no acute distress   Lungs:    Crackles at both bases    Heart:    Tachycardic Irregular rhythm and rate, normal S1 and S2, + rub   Chest Wall:    Well healing midline incision   Abdomen:     Normal bowel sounds, soft non-tender, non-distended   Extremities:   Moves all extremities well, trace edema, no cyanosis, no             redness   Pulses:   Pulses palpable and equal bilaterally "   Skin:   No bleeding, bruising or rash        Results Review:     Results from last 7 days  Lab Units 04/08/18  0511 04/07/18  0319 04/06/18  0412   WBC 10*3/mm3 8.88 11.72* 10.17   HEMOGLOBIN g/dL 9.8* 10.8* 9.8*   HEMATOCRIT % 29.6* 32.3* 28.9*   PLATELETS 10*3/mm3 168 166 178       Results from last 7 days  Lab Units 04/08/18  0511 04/07/18  0319 04/06/18  0412   SODIUM mmol/L 133 134 135   POTASSIUM mmol/L 3.9 4.1 4.3   CHLORIDE mmol/L 101 100 101   CO2 mmol/L 26.0 28.0 26.0   BUN mg/dL 29* 24* 19   CREATININE mg/dL 1.30 1.50* 1.70*   GLUCOSE mg/dL 114* 131* 125*        Results from last 7 days  Lab Units 04/02/18  0956   HEMOGLOBIN A1C % 5.80*                   Results from last 7 days  Lab Units 04/06/18  0412 04/05/18  1058 04/02/18  0956   PROTIME Seconds 11.9* 12.7* 10.9   INR  1.13* 1.21* 1.04   APTT seconds  --  30.1 33.2*           Intake/Output Summary (Last 24 hours) at 04/08/18 1121  Last data filed at 04/08/18 0636   Gross per 24 hour   Intake             1340 ml   Output              720 ml   Net              620 ml       I personally viewed and interpreted the patient's EKG/Telemetry data    EKG: Atrial fibrillation/flutter, RBBB,  bpm     Telemetry: Previously NSR, now new onset atrial fibrillation, HR     Ejection Fraction  No results found for: EF    Echo EF Estimated  No results found for: ECHOEFEST      Present on Admission:  • Unstable angina  • Three-vessel Coronary artery disease with 99% occluded stents to LAD, circumflex, RCA  • Hypertension  • Hyperlipidemia  • GERD (gastroesophageal reflux disease)  • History of prostate Cancer s/p radiation therapy  • History of left corona radiata CVA 2015. No residual and no occlusive carotid artery disease    Assessment/Plan   1) Postop atrial fibrillation - hx of afib x 25 years per patient, no reported hx of AAD therapy, initiated on amio protocol, CHADSVasc = 3, initiate Eliquis 5 mg BID, ASA to 81 mg, plavix daily  2) hypertensive  heart disease: Controlled on current medical therapy  3) hyperlipidemia: On appropriate medications  4) CAD status post coronary bypass and graft surgery with unstable angina 4/5/2018, EF nl  5) previous CVA   6) renal insufficiency - improving, Cr 1.3 today       Pina Burden, APRN  04/08/18  11:21 AM      I, Abran Major MD, personally performed the services face to face as described and documented by the above named individual. I have made any necessary edits and it is both accurate and complete 4/8/2018  12:27 PM

## 2018-04-08 NOTE — PLAN OF CARE
Problem: Fall Risk (Adult)  Goal: Absence of Fall  Outcome: Ongoing (interventions implemented as appropriate)   04/06/18 1818   Fall Risk (Adult)   Absence of Fall making progress toward outcome       Problem: Arrhythmia/Dysrhythmia (Symptomatic) (Adult)  Goal: Signs and Symptoms of Listed Potential Problems Will be Absent, Minimized or Managed (Arrhythmia/Dysrhythmia)  Outcome: Ongoing (interventions implemented as appropriate)   04/08/18 1405   Goal/Outcome Evaluation   Problems Assessed (Arrhythmia/Dysrhythmia) electrophysiologic conduction defect   Problems Present (Dysrhythmia) electrophysiologic conduction defect;hemodynamic instability

## 2018-04-08 NOTE — PROGRESS NOTES
POD 3 Days Post-Op       LOS: 3 days   Patient Care Team:  Mina Story MD as PCP - General    Chief complaint: Coronary artery disease    Subjective   Denies chest pain, denies shortness of breath    Objective    Vital Signs  Temp:  [97.7 °F (36.5 °C)-98.8 °F (37.1 °C)] 97.7 °F (36.5 °C)  Heart Rate:  [66-93] 68  Resp:  [18-21] 18  BP: (110-138)/(53-73) 119/65    Physical Exam:   General Appearance: alert, appears stated age and cooperative   Lungs: clear bilaterally   Heart: Regular rate and rhythm   Skin:  Incision c/d/i     Results     Results from last 7 days  Lab Units 04/08/18  0511   WBC 10*3/mm3 8.88   HEMOGLOBIN g/dL 9.8*   HEMATOCRIT % 29.6*   PLATELETS 10*3/mm3 168       Results from last 7 days  Lab Units 04/08/18  0511   SODIUM mmol/L 133   POTASSIUM mmol/L 3.9   CHLORIDE mmol/L 101   CO2 mmol/L 26.0   BUN mg/dL 29*   CREATININE mg/dL 1.30   GLUCOSE mg/dL 114*   CALCIUM mg/dL 9.0               Assessment    Principal Problem:    Three-vessel Coronary artery disease with 99% occluded stents to LAD, circumflex, RCA  Active Problems:    Unstable angina    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 No residual and no occlusive carotid artery disease        Plan   Doing okay just now waking up.  Discontinue current course of treatment  Beta blockers, aspirin, statins    Francisco Demarco PA-C  04/08/18  8:05 AM

## 2018-04-09 LAB
ABO + RH BLD: NORMAL
ABO + RH BLD: NORMAL
ANION GAP SERPL CALCULATED.3IONS-SCNC: 8 MMOL/L (ref 3–11)
BH BB BLOOD EXPIRATION DATE: NORMAL
BH BB BLOOD EXPIRATION DATE: NORMAL
BH BB BLOOD TYPE BARCODE: 5100
BH BB BLOOD TYPE BARCODE: 5100
BH BB DISPENSE STATUS: NORMAL
BH BB DISPENSE STATUS: NORMAL
BH BB PRODUCT CODE: NORMAL
BH BB PRODUCT CODE: NORMAL
BH BB UNIT NUMBER: NORMAL
BH BB UNIT NUMBER: NORMAL
BUN BLD-MCNC: 28 MG/DL (ref 9–23)
BUN/CREAT SERPL: 21.5 (ref 7–25)
CALCIUM SPEC-SCNC: 8.9 MG/DL (ref 8.7–10.4)
CHLORIDE SERPL-SCNC: 98 MMOL/L (ref 99–109)
CO2 SERPL-SCNC: 28 MMOL/L (ref 20–31)
CREAT BLD-MCNC: 1.3 MG/DL (ref 0.6–1.3)
GFR SERPL CREATININE-BSD FRML MDRD: 55 ML/MIN/1.73
GLUCOSE BLD-MCNC: 113 MG/DL (ref 70–100)
GLUCOSE BLDC GLUCOMTR-MCNC: 116 MG/DL (ref 70–130)
GLUCOSE BLDC GLUCOMTR-MCNC: 134 MG/DL (ref 70–130)
GLUCOSE BLDC GLUCOMTR-MCNC: 142 MG/DL (ref 70–130)
GLUCOSE BLDC GLUCOMTR-MCNC: 152 MG/DL (ref 70–130)
POTASSIUM BLD-SCNC: 3.7 MMOL/L (ref 3.5–5.5)
SODIUM BLD-SCNC: 134 MMOL/L (ref 132–146)
UNIT  ABO: NORMAL
UNIT  ABO: NORMAL
UNIT  RH: NORMAL
UNIT  RH: NORMAL

## 2018-04-09 PROCEDURE — 25010000002 ONDANSETRON PER 1 MG: Performed by: PHYSICIAN ASSISTANT

## 2018-04-09 PROCEDURE — 82962 GLUCOSE BLOOD TEST: CPT

## 2018-04-09 PROCEDURE — 97116 GAIT TRAINING THERAPY: CPT

## 2018-04-09 PROCEDURE — 25010000002 AMIODARONE IN DEXTROSE 5% 360-4.14 MG/200ML-% SOLUTION: Performed by: INTERNAL MEDICINE

## 2018-04-09 PROCEDURE — 97110 THERAPEUTIC EXERCISES: CPT

## 2018-04-09 PROCEDURE — 80048 BASIC METABOLIC PNL TOTAL CA: CPT | Performed by: PHYSICIAN ASSISTANT

## 2018-04-09 PROCEDURE — 25010000002 AMIODARONE IN DEXTROSE 5% 360-4.14 MG/200ML-% SOLUTION: Performed by: NURSE PRACTITIONER

## 2018-04-09 PROCEDURE — 99024 POSTOP FOLLOW-UP VISIT: CPT | Performed by: THORACIC SURGERY (CARDIOTHORACIC VASCULAR SURGERY)

## 2018-04-09 RX ORDER — HYDROCODONE BITARTRATE AND ACETAMINOPHEN 5; 325 MG/1; MG/1
1 TABLET ORAL EVERY 6 HOURS PRN
Qty: 15 TABLET | Refills: 0 | Status: SHIPPED | OUTPATIENT
Start: 2018-04-09 | End: 2018-11-30

## 2018-04-09 RX ORDER — HYDROCODONE BITARTRATE AND ACETAMINOPHEN 5; 325 MG/1; MG/1
1 TABLET ORAL EVERY 6 HOURS PRN
Status: DISCONTINUED | OUTPATIENT
Start: 2018-04-09 | End: 2018-04-12 | Stop reason: HOSPADM

## 2018-04-09 RX ADMIN — AMIODARONE HYDROCHLORIDE 1 MG/MIN: 1.8 INJECTION, SOLUTION INTRAVENOUS at 22:50

## 2018-04-09 RX ADMIN — METOPROLOL TARTRATE 12.5 MG: 25 TABLET, FILM COATED ORAL at 09:32

## 2018-04-09 RX ADMIN — AMIODARONE HYDROCHLORIDE 200 MG: 200 TABLET ORAL at 09:31

## 2018-04-09 RX ADMIN — ONDANSETRON 4 MG: 2 INJECTION INTRAMUSCULAR; INTRAVENOUS at 21:47

## 2018-04-09 RX ADMIN — AMIODARONE HYDROCHLORIDE 1 MG/MIN: 1.8 INJECTION, SOLUTION INTRAVENOUS at 18:11

## 2018-04-09 RX ADMIN — APIXABAN 5 MG: 5 TABLET, FILM COATED ORAL at 21:47

## 2018-04-09 RX ADMIN — LEVOTHYROXINE SODIUM 50 MCG: 50 TABLET ORAL at 06:07

## 2018-04-09 RX ADMIN — CLOPIDOGREL BISULFATE 75 MG: 75 TABLET ORAL at 09:32

## 2018-04-09 RX ADMIN — APIXABAN 5 MG: 5 TABLET, FILM COATED ORAL at 09:31

## 2018-04-09 RX ADMIN — LIOTHYRONINE SODIUM 5 MCG: 5 TABLET ORAL at 21:47

## 2018-04-09 RX ADMIN — AMIODARONE HYDROCHLORIDE 0.5 MG/MIN: 1.8 INJECTION, SOLUTION INTRAVENOUS at 02:24

## 2018-04-09 RX ADMIN — ASPIRIN 81 MG: 81 TABLET, COATED ORAL at 09:32

## 2018-04-09 RX ADMIN — AMIODARONE HYDROCHLORIDE 200 MG: 200 TABLET ORAL at 17:06

## 2018-04-09 RX ADMIN — LIOTHYRONINE SODIUM 5 MCG: 5 TABLET ORAL at 10:45

## 2018-04-09 RX ADMIN — METOPROLOL TARTRATE 12.5 MG: 25 TABLET, FILM COATED ORAL at 21:46

## 2018-04-09 NOTE — PROGRESS NOTES
CTS Progress Note       LOS: 4 days   Patient Care Team:  Mina Story MD as PCP - General    Chief complaint coronary disease    Vital Signs:  Temp:  [97.7 °F (36.5 °C)-98.8 °F (37.1 °C)] 98.5 °F (36.9 °C)  Heart Rate:  [] 57  Resp:  [18-20] 18  BP: ()/(64-85) 102/71    Physical Exam: Sternal incision is satisfactory without erythema or drainage or warmth in the sternum is stable patient's alert and conversant       Results:     Results from last 7 days  Lab Units 04/08/18  0511   WBC 10*3/mm3 8.88   HEMOGLOBIN g/dL 9.8*   HEMATOCRIT % 29.6*   PLATELETS 10*3/mm3 168       Results from last 7 days  Lab Units 04/09/18  0445   SODIUM mmol/L 134   POTASSIUM mmol/L 3.7   CHLORIDE mmol/L 98*   CO2 mmol/L 28.0   BUN mg/dL 28*   CREATININE mg/dL 1.30   GLUCOSE mg/dL 113*   CALCIUM mg/dL 8.9           Imaging Results (last 24 hours)     ** No results found for the last 24 hours. **          Assessment    Principal Problem:    Three-vessel Coronary artery disease with 99% occluded stents to LAD, circumflex, RCA  Active Problems:    Unstable angina    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 No residual and no occlusive carotid artery disease    Patient had been started on amiodarone for atrial fibrillation.  At this time he is in a sinus rhythm with intravenous amiodarone and is on Eliquis.  Plan to switch to oral amiodarone is per cardiology.    Plan   [place] discharge home in 24-48 hours rhythm is satisfactory with cardiology    Please note that portions of this note were completed with a voice recognition program. Efforts were made to edit the dictations, but occasionally words are mistranscribed.    Rakesh Mittal MD  04/09/18  6:48 AM

## 2018-04-09 NOTE — PLAN OF CARE
Problem: Patient Care Overview  Goal: Plan of Care Review  Outcome: Ongoing (interventions implemented as appropriate)   04/09/18 0559   Coping/Psychosocial   Plan of Care Reviewed With patient   Plan of Care Review   Progress improving   OTHER   Outcome Summary VSS, no complaints of pain. Worked well with therapy today. NSR, down to 2LNC as of now. Weaning as tolerated. Off amiodarone drip, on PO. Plan is to be discharged tomorrow.

## 2018-04-09 NOTE — THERAPY TREATMENT NOTE
Acute Care - Physical Therapy Treatment Note  [facility]     Patient Name: Kavon Waters Jr.  : 1950  MRN: [id number]  Today's Date: 2018  Onset of Illness/Injury or Date of Surgery: 18  Date of Referral to PT: 18  Referring Physician: MD Mittal    Admit Date: 2018    Visit Dx:    ICD-10-CM ICD-9-CM   1. Impaired functional mobility, balance, gait, and endurance Z74.09 V49.89   2. Coronary artery disease of native artery of native heart with stable angina pectoris I25.118 414.01     413.9     Patient Active Problem List   Diagnosis   • Unstable angina   • Angina at rest   • Coronary artery disease involving native coronary artery of native heart with angina pectoris   • Peripheral arterial disease   • Three-vessel Coronary artery disease with 99% occluded stents to LAD, circumflex, RCA   • S/P CABG x 2. 18   • Hypertension   • Hyperlipidemia   • GERD (gastroesophageal reflux disease)   • History of prostate Cancer s/p radiation therapy   • History of left corona radiata CVA . No residual and no occlusive carotid artery disease       Therapy Treatment    Therapy Treatment / Health Promotion    Treatment Time/Intention  Discipline: physical therapy assistant (18 0932 : Shamika Calle PTA)  Document Type: therapy note (daily note) (18 0932 : Shamika Calle PTA)  Subjective Information: no complaints (18 0932 : Shamika Calle PTA)  Mode of Treatment: physical therapy (18 0932 : Shamika Calle PTA)  Plan of Care Review  Plan of Care Reviewed With: patient (18 1024 : Shamika Calle PTA)    Vitals/Pain/Safety  Pain Scale: Numbers Pre/Post-Treatment  Pain Scale: Numbers, Pretreatment: 0/10 - no pain (18 0932 : Shamika Calle PTA)  Pain Scale: Numbers, Post-Treatment: 0/10 - no pain (18 0932 : Shamika Calle PTA)  Positioning and Restraints  Pre-Treatment Position: sitting in chair/recliner (18 0932 :  Shamika Calle PTA)  Post Treatment Position: chair (04/09/18 0932 : Shamika Calle PTA)  In Chair: reclined, call light within reach, encouraged to call for assist, with nsg (04/09/18 0932 : Shamika Calle PTA)    Mobility,ADL,Motor, Modality  Bed Mobility Assessment/Treatment  Comment (Bed Mobility): patient up in chair (04/09/18 0932 : Shamika Calle PTA)  Sit-Stand Transfer  Sit-Stand [place] (Transfers): supervision (04/09/18 0932 : Shamika Calle PTA)  Assistive Device (Sit-Stand Transfers): other (see comments) (no AD needed) (04/09/18 0932 : Shamika Calle PTA)  Stand-Sit Transfer  Stand-Sit [place] (Transfers): supervision (04/09/18 0932 : Shamika Calle PTA)  Gait/Stairs Assessment/Training  Gait/Stairs Assessment/Training: gait/ambulation independence (04/09/18 0932 : Shamika Calle PTA)  [place] Level (Gait): contact guard (04/09/18 0932 : Shamika Calle PTA)  Assistive Device (Gait): walker, front-wheeled (04/09/18 0932 : Shamika Calle PTA)  Distance in Feet (Gait): 350 (04/09/18 0932 : Shamika Calle PTA)  Pattern (Gait): step-through (04/09/18 0932 : Shamika Calle PTA)  Deviations/Abnormal Patterns (Gait): kennedy decreased, gait speed decreased (04/09/18 0932 : Shamika Calle PTA)  Comment (Gait/Stairs): patient able to increase gait distance, no complaints and no AD needed (04/09/18 0932 : Shamika Calle PTA)     Lower Extremity Seated Therapeutic Exercise  Performed, Seated Lower Extremity (Therapeutic Exercise): hip flexion/extension, knee flexion/extension, ankle dorsiflexion/plantarflexion (04/09/18 0932 : Shamika Calle PTA)  Exercise Type, Seated Lower Extremity (Therapeutic Exercise): AROM (active range of motion) (04/09/18 0932 : Shamika Heather Safie, PTA)  Sets/Reps Detail, Seated Lower Extremity (Therapeutic Exercise): 10 (04/09/18 0932 : Shamika Calle, PTA)           ROM/MMT             Sensory,  Edema, Orthotics          Cognition, Communication, Swallow  Cognitive Assessment/Intervention- PT/OT  Affect/Mental Status (Cognitive): WNL (04/09/18 0932 : Shamika Calle PTA)  Orientation Status (Cognition): oriented x 4 (04/09/18 0932 : Shamika Calle PTA)  Follows Commands (Cognition): WNL (04/09/18 0932 : Shamika Calle PTA)  Personal Safety Interventions: fall prevention program maintained, gait belt, nonskid shoes/slippers when out of bed (04/09/18 0932 : Shamika Calle PTA)    Outcome Summary               PT Rehab Goals     Row Name 04/06/18 1045             Bed Mobility Goal 1 (PT)    Activity/Assistive Device (Bed Mobility Goal 1, PT) sit to supine/supine to sit  -PEEWEE      [place] Level/Cues Needed (Bed Mobility Goal 1, PT) independent  -PEEWEE      Time Frame (Bed Mobility Goal 1, PT) 10 days  -PEEWEE      Progress/Outcomes (Bed Mobility Goal 1, PT) goal ongoing  -PEEWEE         Transfer Goal 1 (PT)    Activity/Assistive Device (Transfer Goal 1, PT) transfers, all  -PEEWEE      [place] Level/Cues Needed (Transfer Goal 1, PT) independent  -PEEWEE      Time Frame (Transfer Goal 1, PT) 10 days  -PEEWEE      Progress/Outcome (Transfer Goal 1, PT) goal ongoing  -PEEWEE         Gait Training Goal 1 (PT)    Activity/Assistive Device (Gait Training Goal 1, PT) gait (walking locomotion)  -PEEWEE      [place] Level (Gait Training Goal 1, PT) independent  -PEEWEE      Distance (Gait Goal 1, PT) 400  -PEEWEE      Time Frame (Gait Training Goal 1, PT) 10 days  -PEEWEE      Progress/Outcome (Gait Training Goal 1, PT) goal ongoing  -PEEWEE         Patient Education Goal (PT)    Activity (Patient Education Goal, PT) verbalizes HEP  -PEEWEE      [place]/Cues/Accuracy (Memory Goal 2, PT) verbalizes understanding  -PEEWEE      Time Frame (Patient Education Goal, PT) 10 days  -PEEWEE      Progress/Outcome (Patient Education Goal, PT) goal ongoing  -PEEWEE        User Key  (r) = Recorded By, (t) = Taken By, (c) = Cosigned By    Initials Name  Provider Type    PEEWEE Mcintyre, PT Physical Therapist          Physical Therapy Education     Title: PT OT SLP Therapies (Done)     Topic: Physical Therapy (Done)     Point: Mobility training (Done)    Learning Progress Summary     Learner Status Readiness Method Response Comment Documented by    Patient Done Acceptance E VU  AS 04/09/18 1024     Active Acceptance E NR  KR 04/07/18 1025     Active Acceptance E NR  PEEWEE 04/06/18 1337          Point: Home exercise program (Done)    Learning Progress Summary     Learner Status Readiness Method Response Comment Documented by    Patient Done Acceptance E VU  AS 04/09/18 1024     Active Acceptance E NR  KR 04/07/18 1025     Active Acceptance E NR  PEEWEE 04/06/18 1337          Point: Body mechanics (Done)    Learning Progress Summary     Learner Status Readiness Method Response Comment Documented by    Patient Done Acceptance E VU  AS 04/09/18 1024     Active Acceptance E NR  KR 04/07/18 1025     Active Acceptance E NR  PEEWEE 04/06/18 1337          Point: Precautions (Done)    Learning Progress Summary     Learner Status Readiness Method Response Comment Documented by    Patient Done Acceptance E VU  AS 04/09/18 1024     Active Acceptance E NR  KR 04/07/18 1025     Active Acceptance E NR  PEEWEE 04/06/18 1337                      User Key     Initials Effective Dates Name Provider Type Discipline    PEEWEE 06/19/15 -  Dipti Mcintyre, PT Physical Therapist PT    AS 06/22/15 -  Shamika Calle PTA Physical Therapy Assistant PT    KR 04/03/18 -  Cari Nina PT Physical Therapist PT                    PT Recommendation and Plan     Plan of Care Reviewed With: patient  Progress: improving  Outcome Summary: patient able to increase gait distance with no AD needed, patient demonstrated safe technique.          Outcome Measures     Row Name 04/09/18 0932 04/07/18 0844 04/06/18 1045       How much help from another person do you currently need...    Turning from your back to your  side while in flat bed without using bedrails? 3  -AS 3  -KR 3  -PEEWEE    Moving from lying on back to sitting on the side of a flat bed without bedrails? 3  -AS 2  -KR 2  -PEEWEE    Moving to and from a bed to a chair (including a wheelchair)? 3  -AS 3  -KR 3  -PEEWEE    Standing up from a chair using your arms (e.g., wheelchair, bedside chair)? 3  -AS 3  -KR 3  -PEEWEE    Climbing 3-5 steps with a railing? 3  -AS 2  -KR 2  -PEEWEE    To walk in hospital room? 3  -AS 3  -KR 3  -PEEWEE    AM-PAC 6 Clicks Score 18  -AS 16  -KR 16  -PEEWEE       Functional Assessment    Outcome Measure Options AM-PAC 6 Clicks Basic Mobility (PT)  -AS AM-PAC 6 Clicks Basic Mobility (PT)  -KR AM-PAC 6 Clicks Basic Mobility (PT)  -PEEWEE      User Key  (r) = Recorded By, (t) = Taken By, (c) = Cosigned By    Initials Name Provider Type    PEEWEE Dipti Mcintyre, PT Physical Therapist    AS Shamika Calle PTA Physical Therapy Assistant    LEXI Nina, PT Physical Therapist           Time Calculation:         PT Charges     Row Name 04/09/18 1025             Time Calculation    Start Time 0932  -AS      PT Received On 04/09/18  -AS      PT Goal Re-Cert Due Date 04/16/18  -AS         Time Calculation- PT    Total Timed Code Minutes- PT 23 minute(s)  -AS        User Key  (r) = Recorded By, (t) = Taken By, (c) = Cosigned By    Initials Name Provider Type    AS Shamika Calle PTA Physical Therapy Assistant          Therapy Charges for Today     Code Description Service Date Service Provider Modifiers Qty    06287405680 HC GAIT TRAINING EA 15 MIN 4/9/2018 Shamika Calle PTA GP 1    77208250279 HC PT THER PROC EA 15 MIN 4/9/2018 Shamika Calle PTA GP 1          PT G-Codes  Outcome Measure Options: AM-PAC 6 Clicks Basic Mobility (PT)    Shamika Calle PTA  4/9/2018

## 2018-04-09 NOTE — PROGRESS NOTES
[facility]       LOS: 4 days   Patient Care Team:  Mina Story MD as PCP - General        Subjective       Patient Denies:  Cp, sob, palpitations      Vital Signs  Temp:  [97.9 °F (36.6 °C)-98.8 °F (37.1 °C)] 97.9 °F (36.6 °C)  Heart Rate:  [] 68  Resp:  [18-20] 20  BP: ()/(64-80) 135/72    Intake/Output Summary (Last 24 hours) at 04/09/18 1029  Last data filed at 04/09/18 0900   Gross per 24 hour   Intake              360 ml   Output                0 ml   Net              360 ml     I/O this shift:  In: 120 [P.O.:120]  Out: -     Physical Exam:     General Appearance:    Alert, cooperative, in no acute distress       Neck:   No adenopathy, supple, trachea midline, no thyromegaly, no JVD       Lungs:     Clear to auscultation,respirations regular, even and                  unlabored    Heart:    Regular rhythm and normal rate, normal S1 and S2, no            murmur, no gallop, no rub, no click   Chest Wall:    No abnormalities observed   Abdomen:     Normal bowel sounds, no masses, no organomegaly, soft        non-tender, non-distended       Extremities:   Moves all extremities well, no edema, no cyanosis, no             redness   Pulses:   Pulses palpable and equal bilaterally     Results Review:     I reviewed the patient's new clinical results.      WBC WBC   Date/Time Value Ref Range Status   04/08/2018 0511 8.88 3.50 - 10.80 10*3/mm3 Final   04/07/2018 0319 11.72 (H) 3.50 - 10.80 10*3/mm3 Final            HGB Hemoglobin   Date/Time Value Ref Range Status   04/08/2018 0511 9.8 (L) 13.1 - 17.5 g/dL Final   04/07/2018 0319 10.8 (L) 13.1 - 17.5 g/dL Final           HCT Hematocrit   Date/Time Value Ref Range Status   04/08/2018 0511 29.6 (L) 38.9 - 50.9 % Final   04/07/2018 0319 32.3 (L) 38.9 - 50.9 % Final            Platlets No results found for: LABPLAT  Sodium  Sodium   Date/Time Value Ref Range Status   04/09/2018 0445 134 132 - 146  mmol/L Final   04/08/2018 0511 133 132 - 146 mmol/L Final   04/07/2018 0319 134 132 - 146 mmol/L Final     Potassium  Potassium   Date/Time Value Ref Range Status   04/09/2018 0445 3.7 3.5 - 5.5 mmol/L Final   04/08/2018 0511 3.9 3.5 - 5.5 mmol/L Final   04/07/2018 0319 4.1 3.5 - 5.5 mmol/L Final     Chloride  Chloride   Date/Time Value Ref Range Status   04/09/2018 0445 98 (L) 99 - 109 mmol/L Final   04/08/2018 0511 101 99 - 109 mmol/L Final   04/07/2018 0319 100 99 - 109 mmol/L Final     BicarbonateNo results found for: PLASMABICARB    BUN BUN   Date/Time Value Ref Range Status   04/09/2018 0445 28 (H) 9 - 23 mg/dL Final   04/08/2018 0511 29 (H) 9 - 23 mg/dL Final   04/07/2018 0319 24 (H) 9 - 23 mg/dL Final      Creatinine Creatinine   Date/Time Value Ref Range Status   04/09/2018 0445 1.30 0.60 - 1.30 mg/dL Final   04/08/2018 0511 1.30 0.60 - 1.30 mg/dL Final   04/07/2018 0319 1.50 (H) 0.60 - 1.30 mg/dL Final      Calcium Calcium   Date/Time Value Ref Range Status   04/09/2018 0445 8.9 8.7 - 10.4 mg/dL Final   04/08/2018 0511 9.0 8.7 - 10.4 mg/dL Final   04/07/2018 0319 9.2 8.7 - 10.4 mg/dL Final      Mag Magnesium   Date/Time Value Ref Range Status   04/07/2018 0319 2.1 1.3 - 2.7 mg/dL Final           PT/INR:  No results found for: PROTIME/No results found for: INR  Troponin I   No results found for: CKTOTAL, CKMB, CKMBINDEX, TROPONINI, TROPONINT      amiodarone 200 mg Oral Q8H   Followed by      [START ON 4/16/2018] amiodarone 200 mg Oral Q12H   Followed by      [START ON 4/30/2018] amiodarone 200 mg Oral Daily   apixaban 5 mg Oral Q12H   aspirin 81 mg Oral Daily   atorvastatin 40 mg Oral Nightly   clopidogrel 75 mg Oral Daily   insulin lispro 0-7 Units Subcutaneous 4x Daily With Meals & Nightly   levothyroxine 50 mcg Oral Q AM   liothyronine 5 mcg Oral BID   metoprolol tartrate 12.5 mg Oral Q12H   pharmacy consult - MTM  Does not apply Daily          Assessment/Plan     Patient Active Problem List   Diagnosis  Code   • Unstable angina I20.0   • Angina at rest I20.8   • Coronary artery disease involving native coronary artery of native heart with angina pectoris I25.119   • Peripheral arterial disease I73.9   • Three-vessel Coronary artery disease with 99% occluded stents to LAD, circumflex, RCA I25.10   • S/P CABG x 2. 4/5/18 Z95.1   • Hypertension I10   • Hyperlipidemia E78.5   • GERD (gastroesophageal reflux disease) K21.9   • History of prostate Cancer s/p radiation therapy C80.1   • History of left corona radiata CVA 2015. No residual and no occlusive carotid artery disease I63.9     CV stable  Maintaining NSR  Increase beta blocker  Home soon    GINNY Shepherd  04/09/18  10:29 AM

## 2018-04-09 NOTE — PROGRESS NOTES
Order received for Phase II Cardiac Rehab. Pt. Was unavailable at time of consult. Staff to follow up.

## 2018-04-09 NOTE — PLAN OF CARE
Problem: Patient Care Overview  Goal: Plan of Care Review  Outcome: Ongoing (interventions implemented as appropriate)   04/09/18 1024   Coping/Psychosocial   Plan of Care Reviewed With patient   Plan of Care Review   Progress improving   OTHER   Outcome Summary patient able to increase gait distance with no AD needed, patient demonstrated safe technique.

## 2018-04-09 NOTE — PROGRESS NOTES
Continued Stay Note  [facility]     Patient Name: Kavon Waters Jr.  MRN: [id number]  Today's Date: 4/9/2018    Admit Date: 4/5/2018          Discharge Plan     Row Name 04/09/18 1307       Plan    Plan Home with spouse    Patient/Family in Agreement with Plan yes    Plan Comments Met with patient at bedside. Still plans to return home at discharge. Currently on 3 LPM oxygen and did not have home oxygen prior to admission. Will need to wean prior to discharge, hopefully tomorrow. Walked 350' with PT this morning. Family will transport home. Kailey Freeman RN [phone]    Final Discharge Disposition Code 01 - home or self-care              Discharge Codes    No documentation.       Expected Discharge Date and Time     Expected Discharge Date Expected Discharge Time    Apr 11, 2018             Kailey Freeman RN

## 2018-04-10 LAB
GLUCOSE BLDC GLUCOMTR-MCNC: 115 MG/DL (ref 70–130)
GLUCOSE BLDC GLUCOMTR-MCNC: 124 MG/DL (ref 70–130)
GLUCOSE BLDC GLUCOMTR-MCNC: 127 MG/DL (ref 70–130)
GLUCOSE BLDC GLUCOMTR-MCNC: 143 MG/DL (ref 70–130)

## 2018-04-10 PROCEDURE — 99024 POSTOP FOLLOW-UP VISIT: CPT | Performed by: THORACIC SURGERY (CARDIOTHORACIC VASCULAR SURGERY)

## 2018-04-10 PROCEDURE — 25010000002 AMIODARONE IN DEXTROSE 5% 360-4.14 MG/200ML-% SOLUTION: Performed by: INTERNAL MEDICINE

## 2018-04-10 PROCEDURE — 82962 GLUCOSE BLOOD TEST: CPT

## 2018-04-10 RX ADMIN — APIXABAN 5 MG: 5 TABLET, FILM COATED ORAL at 22:40

## 2018-04-10 RX ADMIN — METOPROLOL TARTRATE 12.5 MG: 25 TABLET, FILM COATED ORAL at 09:15

## 2018-04-10 RX ADMIN — LEVOTHYROXINE SODIUM 50 MCG: 50 TABLET ORAL at 06:41

## 2018-04-10 RX ADMIN — METOPROLOL TARTRATE 12.5 MG: 25 TABLET, FILM COATED ORAL at 22:39

## 2018-04-10 RX ADMIN — ASPIRIN 81 MG: 81 TABLET, COATED ORAL at 09:11

## 2018-04-10 RX ADMIN — AMIODARONE HYDROCHLORIDE 0.5 MG/MIN: 1.8 INJECTION, SOLUTION INTRAVENOUS at 09:09

## 2018-04-10 RX ADMIN — CLOPIDOGREL BISULFATE 75 MG: 75 TABLET ORAL at 09:11

## 2018-04-10 RX ADMIN — APIXABAN 5 MG: 5 TABLET, FILM COATED ORAL at 09:11

## 2018-04-10 RX ADMIN — FAMOTIDINE 20 MG: 20 TABLET, FILM COATED ORAL at 22:44

## 2018-04-10 RX ADMIN — LIOTHYRONINE SODIUM 5 MCG: 5 TABLET ORAL at 22:40

## 2018-04-10 RX ADMIN — LIOTHYRONINE SODIUM 5 MCG: 5 TABLET ORAL at 09:10

## 2018-04-10 RX ADMIN — AMIODARONE HYDROCHLORIDE 200 MG: 200 TABLET ORAL at 16:42

## 2018-04-10 RX ADMIN — AMIODARONE HYDROCHLORIDE 200 MG: 200 TABLET ORAL at 09:15

## 2018-04-10 NOTE — NURSING NOTE
Pt. Referred for Phase II Cardiac Rehab. Staff discussed benefits of exercise, program protocol, and educational material provided. Teach back verified.  Permission granted from patient for staff to fax referral information to outlying program at this time.  Staff to fax referral info to [facility].

## 2018-04-10 NOTE — PROGRESS NOTES
[facility]       LOS: 5 days   Patient Care Team:  Mina Story MD as PCP - General        Subjective       Patient Denies:  Cp, sob, palpitations      Vital Signs  Temp:  [97.3 °F (36.3 °C)-98.5 °F (36.9 °C)] 97.3 °F (36.3 °C)  Heart Rate:  [62-87] 66  Resp:  [18-20] 19  BP: (123-141)/(69-81) 129/71    Intake/Output Summary (Last 24 hours) at 04/10/18 0918  Last data filed at 04/10/18 0100   Gross per 24 hour   Intake              360 ml   Output              400 ml   Net              -40 ml     No intake/output data recorded.    Physical Exam:     General Appearance:    Alert, cooperative, in no acute distress       Neck:   No adenopathy, supple, trachea midline, no thyromegaly, no JVD       Lungs:     Clear to auscultation,respirations regular, even and                  unlabored    Heart:    Regular rhythm and normal rate, normal S1 and S2, no            murmur, no gallop, no rub, no click   Chest Wall:    No abnormalities observed   Abdomen:     Normal bowel sounds, no masses, no organomegaly, soft        non-tender, non-distended       Extremities:   Moves all extremities well, no edema, no cyanosis, no             redness   Pulses:   Pulses palpable and equal bilaterally     Results Review:     I reviewed the patient's new clinical results.      WBC WBC   Date/Time Value Ref Range Status   04/08/2018 0511 8.88 3.50 - 10.80 10*3/mm3 Final            HGB Hemoglobin   Date/Time Value Ref Range Status   04/08/2018 0511 9.8 (L) 13.1 - 17.5 g/dL Final           HCT Hematocrit   Date/Time Value Ref Range Status   04/08/2018 0511 29.6 (L) 38.9 - 50.9 % Final            Platlets No results found for: LABPLAT  Sodium  Sodium   Date/Time Value Ref Range Status   04/09/2018 0445 134 132 - 146 mmol/L Final   04/08/2018 0511 133 132 - 146 mmol/L Final     Potassium  Potassium   Date/Time Value Ref Range Status   04/09/2018 0445 3.7 3.5 - 5.5 mmol/L Final    04/08/2018 0511 3.9 3.5 - 5.5 mmol/L Final     Chloride  Chloride   Date/Time Value Ref Range Status   04/09/2018 0445 98 (L) 99 - 109 mmol/L Final   04/08/2018 0511 101 99 - 109 mmol/L Final     BicarbonateNo results found for: PLASMABICARB    BUN BUN   Date/Time Value Ref Range Status   04/09/2018 0445 28 (H) 9 - 23 mg/dL Final   04/08/2018 0511 29 (H) 9 - 23 mg/dL Final      Creatinine Creatinine   Date/Time Value Ref Range Status   04/09/2018 0445 1.30 0.60 - 1.30 mg/dL Final   04/08/2018 0511 1.30 0.60 - 1.30 mg/dL Final      Calcium Calcium   Date/Time Value Ref Range Status   04/09/2018 0445 8.9 8.7 - 10.4 mg/dL Final   04/08/2018 0511 9.0 8.7 - 10.4 mg/dL Final      Mag No results found for: MG        PT/INR:  No results found for: PROTIME/No results found for: INR  Troponin I   No results found for: CKTOTAL, CKMB, CKMBINDEX, TROPONINI, TROPONINT      amiodarone 200 mg Oral Q8H   Followed by      [START ON 4/16/2018] amiodarone 200 mg Oral Q12H   Followed by      [START ON 4/30/2018] amiodarone 200 mg Oral Daily   apixaban 5 mg Oral Q12H   aspirin 81 mg Oral Daily   atorvastatin 40 mg Oral Nightly   clopidogrel 75 mg Oral Daily   insulin lispro 0-7 Units Subcutaneous 4x Daily With Meals & Nightly   levothyroxine 50 mcg Oral Q AM   liothyronine 5 mcg Oral BID   metoprolol tartrate 12.5 mg Oral Q12H   pharmacy consult - MTM  Does not apply Daily       amiodarone 0.5 mg/min Last Rate: 0.5 mg/min (04/10/18 0909)       Assessment/Plan     Patient Active Problem List   Diagnosis Code   • Unstable angina I20.0   • Angina at rest I20.8   • Coronary artery disease involving native coronary artery of native heart with angina pectoris I25.119   • Peripheral arterial disease I73.9   • Three-vessel Coronary artery disease with 99% occluded stents to LAD, circumflex, RCA I25.10   • S/P CABG x 2. 4/5/18 Z95.1   • Hypertension I10   • Hyperlipidemia E78.5   • GERD (gastroesophageal reflux disease) K21.9   • History of  prostate Cancer s/p radiation therapy C80.1   • History of left corona radiata CVA 2015. No residual and no occlusive carotid artery disease I63.9     CV stable  Maintaining NSR  D/c IV amiodarone  Home in am    GINNY Shepherd  04/10/18  9:18 AM

## 2018-04-10 NOTE — PROGRESS NOTES
CTS Progress Note      POD 5 s/p  Coronary artery bypass grafting ×2 with endoscopic harvesting of the left great saphenous vein.  Left internal mammary artery to left anterior descending saphenous vein graft to the obtuse marginal branch the circumflex    Subjective        Objective    Physical Exam:   Vital Signs   Temp:  [97.9 °F (36.6 °C)-98.5 °F (36.9 °C)] 98.5 °F (36.9 °C)  Heart Rate:  [62-87] 74  Resp:  [18-20] 20  BP: (123-139)/(69-78) 139/69   GEN: NAD   No further reports of angina  Results       Results from last 7 days  Lab Units 04/08/18  0511   WBC 10*3/mm3 8.88   HEMOGLOBIN g/dL 9.8*   HEMATOCRIT % 29.6*   PLATELETS 10*3/mm3 168       Results from last 7 days  Lab Units 04/09/18  0445   SODIUM mmol/L 134   POTASSIUM mmol/L 3.7   CHLORIDE mmol/L 98*   CO2 mmol/L 28.0   BUN mg/dL 28*   CREATININE mg/dL 1.30   GLUCOSE mg/dL 113*   CALCIUM mg/dL 8.9       Results from last 7 days  Lab Units 04/06/18  0412 04/05/18  1058   INR  1.13* 1.21*   APTT seconds  --  30.1         Assessment/Plan   POD#5 s/p CABG  Principal Problem:    Three-vessel Coronary artery disease with 99% occluded stents to LAD, circumflex, RCA  Active Problems:    Unstable angina    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 No residual and no occlusive carotid artery disease        Plan   D/c home soonAnticoagulation as per cardiology anticipate discharge home tomorrow    GINNY Layton  04/10/18  7:43 AM

## 2018-04-11 LAB
GLUCOSE BLDC GLUCOMTR-MCNC: 119 MG/DL (ref 70–130)
GLUCOSE BLDC GLUCOMTR-MCNC: 129 MG/DL (ref 70–130)
GLUCOSE BLDC GLUCOMTR-MCNC: 138 MG/DL (ref 70–130)
GLUCOSE BLDC GLUCOMTR-MCNC: 141 MG/DL (ref 70–130)

## 2018-04-11 PROCEDURE — 93005 ELECTROCARDIOGRAM TRACING: CPT | Performed by: THORACIC SURGERY (CARDIOTHORACIC VASCULAR SURGERY)

## 2018-04-11 PROCEDURE — 99024 POSTOP FOLLOW-UP VISIT: CPT | Performed by: THORACIC SURGERY (CARDIOTHORACIC VASCULAR SURGERY)

## 2018-04-11 PROCEDURE — 25010000002 AMIODARONE IN DEXTROSE 5% 360-4.14 MG/200ML-% SOLUTION: Performed by: NURSE PRACTITIONER

## 2018-04-11 PROCEDURE — 25010000002 AMIODARONE IN DEXTROSE 5% 150-4.21 MG/100ML-% SOLUTION: Performed by: NURSE PRACTITIONER

## 2018-04-11 PROCEDURE — 82962 GLUCOSE BLOOD TEST: CPT

## 2018-04-11 PROCEDURE — 97116 GAIT TRAINING THERAPY: CPT

## 2018-04-11 PROCEDURE — 93010 ELECTROCARDIOGRAM REPORT: CPT | Performed by: INTERNAL MEDICINE

## 2018-04-11 PROCEDURE — 97110 THERAPEUTIC EXERCISES: CPT

## 2018-04-11 RX ORDER — AMIODARONE HYDROCHLORIDE 200 MG/1
200 TABLET ORAL EVERY 12 HOURS
Status: DISCONTINUED | OUTPATIENT
Start: 2018-04-19 | End: 2018-04-12 | Stop reason: HOSPADM

## 2018-04-11 RX ORDER — AMIODARONE HYDROCHLORIDE 200 MG/1
200 TABLET ORAL DAILY
Status: DISCONTINUED | OUTPATIENT
Start: 2018-05-03 | End: 2018-04-12 | Stop reason: HOSPADM

## 2018-04-11 RX ORDER — AMIODARONE HYDROCHLORIDE 200 MG/1
200 TABLET ORAL EVERY 8 HOURS
Status: DISCONTINUED | OUTPATIENT
Start: 2018-04-12 | End: 2018-04-12 | Stop reason: HOSPADM

## 2018-04-11 RX ADMIN — Medication 2 TABLET: at 09:29

## 2018-04-11 RX ADMIN — METOPROLOL TARTRATE 12.5 MG: 25 TABLET, FILM COATED ORAL at 09:29

## 2018-04-11 RX ADMIN — APIXABAN 5 MG: 5 TABLET, FILM COATED ORAL at 21:50

## 2018-04-11 RX ADMIN — LIOTHYRONINE SODIUM 5 MCG: 5 TABLET ORAL at 21:50

## 2018-04-11 RX ADMIN — LEVOTHYROXINE SODIUM 50 MCG: 50 TABLET ORAL at 05:41

## 2018-04-11 RX ADMIN — ASPIRIN 81 MG: 81 TABLET, COATED ORAL at 09:07

## 2018-04-11 RX ADMIN — METOPROLOL TARTRATE 25 MG: 25 TABLET ORAL at 21:48

## 2018-04-11 RX ADMIN — AMIODARONE HYDROCHLORIDE 150 MG: 1.5 INJECTION, SOLUTION INTRAVENOUS at 05:42

## 2018-04-11 RX ADMIN — AMIODARONE HYDROCHLORIDE 1 MG/MIN: 1.8 INJECTION, SOLUTION INTRAVENOUS at 05:43

## 2018-04-11 RX ADMIN — CLOPIDOGREL BISULFATE 75 MG: 75 TABLET ORAL at 09:07

## 2018-04-11 RX ADMIN — LIOTHYRONINE SODIUM 5 MCG: 5 TABLET ORAL at 09:07

## 2018-04-11 RX ADMIN — AMIODARONE HYDROCHLORIDE 200 MG: 200 TABLET ORAL at 02:19

## 2018-04-11 RX ADMIN — APIXABAN 5 MG: 5 TABLET, FILM COATED ORAL at 09:07

## 2018-04-11 RX ADMIN — DOCUSATE SODIUM 100 MG: 100 CAPSULE, LIQUID FILLED ORAL at 09:29

## 2018-04-11 RX ADMIN — AMIODARONE HYDROCHLORIDE 0.5 MG/MIN: 1.8 INJECTION, SOLUTION INTRAVENOUS at 11:35

## 2018-04-11 RX ADMIN — METOPROLOL TARTRATE 12.5 MG: 25 TABLET, FILM COATED ORAL at 09:07

## 2018-04-11 NOTE — PROGRESS NOTES
[facility]       LOS: 6 days   Patient Care Team:  Mina Story MD as PCP - General        Subjective       Patient Denies:  Cp, sob, palpitations.  Episode of recurrent A. fib with RVR this morning      Vital Signs  Temp:  [97.7 °F (36.5 °C)-98.5 °F (36.9 °C)] 98.5 °F (36.9 °C)  Heart Rate:  [] 68  Resp:  [18] 18  BP: (126-160)/(58-86) 141/86    Intake/Output Summary (Last 24 hours) at 04/11/18 1011  Last data filed at 04/10/18 1300   Gross per 24 hour   Intake              240 ml   Output                0 ml   Net              240 ml     No intake/output data recorded.    Physical Exam:     General Appearance:    Alert, cooperative, in no acute distress       Neck:   No adenopathy, supple, trachea midline, no thyromegaly, no JVD       Lungs:     Clear to auscultation,respirations regular, even and                  unlabored    Heart:    Regular rhythm and normal rate, normal S1 and S2, no            murmur, no gallop, no rub, no click   Chest Wall:    No abnormalities observed   Abdomen:     Normal bowel sounds, no masses, no organomegaly, soft        non-tender, non-distended       Extremities:   Moves all extremities well, no edema, no cyanosis, no             redness   Pulses:   Pulses palpable and equal bilaterally     Results Review:     I reviewed the patient's new clinical results.      WBC No results found for: WBC         HGB No results found for: HGB        HCT No results found for: HCT         Platlets No results found for: LABPLAT  Sodium  Sodium   Date/Time Value Ref Range Status   04/09/2018 0445 134 132 - 146 mmol/L Final     Potassium  Potassium   Date/Time Value Ref Range Status   04/09/2018 0445 3.7 3.5 - 5.5 mmol/L Final     Chloride  Chloride   Date/Time Value Ref Range Status   04/09/2018 0445 98 (L) 99 - 109 mmol/L Final     BicarbonateNo results found for: PLASMABICARB    BUN BUN   Date/Time Value Ref Range Status    04/09/2018 0445 28 (H) 9 - 23 mg/dL Final      Creatinine Creatinine   Date/Time Value Ref Range Status   04/09/2018 0445 1.30 0.60 - 1.30 mg/dL Final      Calcium Calcium   Date/Time Value Ref Range Status   04/09/2018 0445 8.9 8.7 - 10.4 mg/dL Final      Mag No results found for: MG        PT/INR:  No results found for: PROTIME/No results found for: INR  Troponin I   No results found for: CKTOTAL, CKMB, CKMBINDEX, TROPONINI, TROPONINT      apixaban 5 mg Oral Q12H   aspirin 81 mg Oral Daily   atorvastatin 40 mg Oral Nightly   clopidogrel 75 mg Oral Daily   insulin lispro 0-7 Units Subcutaneous 4x Daily With Meals & Nightly   levothyroxine 50 mcg Oral Q AM   liothyronine 5 mcg Oral BID   metoprolol tartrate 25 mg Oral Q12H   pharmacy consult - MTM  Does not apply Daily       amiodarone 1 mg/min Last Rate: 1 mg/min (04/11/18 0543)   Followed by     amiodarone 0.5 mg/min        Assessment/Plan     Patient Active Problem List   Diagnosis Code   • Unstable angina I20.0   • Angina at rest I20.8   • Coronary artery disease involving native coronary artery of native heart with angina pectoris I25.119   • Peripheral arterial disease I73.9   • Three-vessel Coronary artery disease with 99% occluded stents to LAD, circumflex, RCA I25.10   • S/P CABG x 2. 4/5/18 Z95.1   • Hypertension I10   • Hyperlipidemia E78.5   • GERD (gastroesophageal reflux disease) K21.9   • History of prostate Cancer s/p radiation therapy C80.1   • History of left corona radiata CVA 2015. No residual and no occlusive carotid artery disease I63.9     CV stable  Currently in normal sinus rhythm  Outpatient consult with Dr. Kauffman arranged due to long-standing history of paroxysmal atrial fibrillation.  Continue Eliquis  Probably home in [place].GINNY Muse  04/11/18  10:11 AM

## 2018-04-11 NOTE — PROGRESS NOTES
CTS Progress Note      POD #6 s/p CABG x2       Chief Complaint: Fatigue    Subjective  Patient states he is overall doing well however a CT but denies any chest pain and shortness of breath.  Would like to go home however went into atrial fibrillation with RVR overnight.      Objective    Physical Exam:   Vital Signs   Temp:  [97.7 °F (36.5 °C)-98.5 °F (36.9 °C)] 98.5 °F (36.9 °C)  Heart Rate:  [59-77] 65  Resp:  [18] 18  BP: (129-160)/(58-82) 156/58   GEN: NAD   RESP: Clear to auscultation bilaterally no wheezes, rales or rhonchi    CV: Regular rate and rhythm with no murmurs, rubs or gallops.  Sternum stable.   ABD: Soft, nontender/nondistended with normoactive bowel sounds    EXT: Warm with good color well-perfused no bilateral lower extremity edema   INT: Incision c/d/i    Intake/Output Summary (Last 24 hours) at 04/11/18 0820  Last data filed at 04/10/18 1300   Gross per 24 hour   Intake              480 ml   Output                0 ml   Net              480 ml     Results       Results from last 7 days  Lab Units 04/08/18  0511   WBC 10*3/mm3 8.88   HEMOGLOBIN g/dL 9.8*   HEMATOCRIT % 29.6*   PLATELETS 10*3/mm3 168       Results from last 7 days  Lab Units 04/09/18  0445   SODIUM mmol/L 134   POTASSIUM mmol/L 3.7   CHLORIDE mmol/L 98*   CO2 mmol/L 28.0   BUN mg/dL 28*   CREATININE mg/dL 1.30   GLUCOSE mg/dL 113*   CALCIUM mg/dL 8.9       Results from last 7 days  Lab Units 04/06/18  0412 04/05/18  1058   INR  1.13* 1.21*   APTT seconds  --  30.1         Assessment/Plan     Principal Problem:    Three-vessel Coronary artery disease with 99% occluded stents to LAD, circumflex, RCA  Active Problems:    Unstable angina    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 No residual and no occlusive carotid artery disease        Plan   Converted out of A. fib and  in sinus  rhythm on amiodarone drip, per cardiology  Ambulate  Aggressive pulmonary toilet  Possible DC home in next 1-2 days.    GINNY Booth  04/11/18  8:20 AM

## 2018-04-11 NOTE — PLAN OF CARE
Problem: Patient Care Overview  Goal: Plan of Care Review  Outcome: Ongoing (interventions implemented as appropriate)   04/11/18 1620   Coping/Psychosocial   Plan of Care Reviewed With patient   Plan of Care Review   Progress improving   OTHER   Outcome Summary Pt dem independence with t/f's and ambulates 2 x200ft with SBA. Pt eager to go home.

## 2018-04-11 NOTE — THERAPY TREATMENT NOTE
Acute Care - Physical Therapy Treatment Note  [facility]     Patient Name: Kavon Waters Jr.  : 1950  MRN: [id number]  Today's Date: 2018  Onset of Illness/Injury or Date of Surgery: 18  Date of Referral to PT: 18  Referring Physician: MD Mittal    Admit Date: 2018    Visit Dx:    ICD-10-CM ICD-9-CM   1. Impaired functional mobility, balance, gait, and endurance Z74.09 V49.89   2. Coronary artery disease of native artery of native heart with stable angina pectoris I25.118 414.01     413.9     Patient Active Problem List   Diagnosis   • Unstable angina   • Angina at rest   • Coronary artery disease involving native coronary artery of native heart with angina pectoris   • Peripheral arterial disease   • Three-vessel Coronary artery disease with 99% occluded stents to LAD, circumflex, RCA   • S/P CABG x 2. 18   • Hypertension   • Hyperlipidemia   • GERD (gastroesophageal reflux disease)   • History of prostate Cancer s/p radiation therapy   • History of left corona radiata CVA . No residual and no occlusive carotid artery disease       Therapy Treatment          Rehabilitation Treatment Summary     Row Name 18 1550             Treatment Time/Intention    Discipline physical therapist  -SJ      Document Type therapy note (daily note)  -SJ      Subjective Information no complaints  -SJ      Mode of Treatment individual therapy  -SJ      Therapy Frequency (PT Clinical Impression) daily  -SJ      Patient Effort good  -SJ      Existing Precautions/Restrictions cardiac;sternal  -SJ      Recorded by [SJ] Kinsey Golden, PT 18 1609 18 1609      Row Name 18 1550             Vital Signs    Pre Systolic BP Rehab 124  -SJ      Pre Treatment Diastolic BP 85  -SJ      Intratreatment Heart Rate (beats/min) 74  -SJ      O2 Delivery Pre Treatment room air  -SJ      Pre Patient Position Sitting  -SJ      Intra Patient Position Standing  -SJ      Post Patient Position  Sitting  -SJ      Recorded by [SJ] Kinsey Golden, PT 04/11/18 1609 04/11/18 1609      Row Name 04/11/18 1550             Cognitive Assessment/Intervention- PT/OT    Affect/Mental Status (Cognitive) WNL  -SJ      Orientation Status (Cognition) oriented x 4  -SJ      Follows Commands (Cognition) WNL  -SJ      Cognitive Function (Cognitive) WFL  -SJ      Personal Safety Interventions muscle strengthening facilitated;nonskid shoes/slippers when out of bed  -SJ      Recorded by [SJ] Kinsey Golden, PT 04/11/18 1609 04/11/18 1609      Row Name 04/11/18 1550             Safety Issues, Functional Mobility    Impairments Affecting Function (Mobility) endurance/activity tolerance;balance;strength  -SJ      Recorded by [SJ] Kinsey Golden, PT 04/11/18 1609 04/11/18 1609      Row Name 04/11/18 1550             Bed Mobility Assessment/Treatment    Sit-Supine [place] (Bed Mobility) conditional independence  -SJ      Recorded by [SJ] Kinsey Golden, PT 04/11/18 1609 04/11/18 1609      Row Name 04/11/18 1550             Transfer Assessment/Treatment    Comment (Transfers) Pt dem good safety awareness with all transfers  -SJ      Sit-Stand [place] (Transfers) conditional independence  -SJ2      Stand-Sit [place] (Transfers) conditional independence  -SJ2      Recorded by [SJ] Kinsey Golden, PT 04/11/18 1619 04/11/18 1619  [SJ2] Kinsey Golden, PT 04/11/18 1609 04/11/18 1609      Row Name 04/11/18 1550             Gait/Stairs Assessment/Training    Gait/Stairs Assessment/Training gait/ambulation independence  -SJ      [place] Level (Gait) stand by assist  -SJ      Distance in Feet (Gait) 400   2x200  -SJ2      Pattern (Gait) step-through  -SJ      Deviations/Abnormal Patterns (Gait) kennedy decreased;gait speed decreased  -SJ      Bilateral Gait Deviations weight shift ability decreased  -SJ      Comment (Gait/Stairs) 2 x200ft. No AD needed, good safety. 1 LOB when turning a corner due to a bed being  placed out of line of sight.  -SJ2      Recorded by [SJ] Kinsey Golden, PT 04/11/18 1609 04/11/18 1609  [SJ2] Kinsey Golden, PT 04/11/18 1621 04/11/18 1621      Row Name 04/11/18 1550             Lower Extremity Seated Therapeutic Exercise    Sets/Reps Detail, Seated Lower Extremity (Therapeutic Exercise) Pt declined therex due to fatigue  -SJ      Recorded by [SJ] Kinsey Golden, PT 04/11/18 1619 04/11/18 1619      Row Name 04/11/18 1550             Static Sitting Balance    Level of [place] (Unsupported Sitting, Static Balance) independent  -SJ      Recorded by [SJ] Kinsey Golden, PT 04/11/18 1609 04/11/18 1609      Row Name 04/11/18 1550             Static Standing Balance    Level of [place] (Supported Standing, Static Balance) independent  -SJ      Recorded by [SJ] Kinsey Golden, PT 04/11/18 1609 04/11/18 1609      Row Name 04/11/18 1550             Positioning and Restraints    Pre-Treatment Position sitting in chair/recliner  -SJ      Post Treatment Position chair  -SJ      In Chair reclined;call light within reach;encouraged to call for assist;heels elevated  -SJ      Recorded by [SJ] Kinsey Golden, PT 04/11/18 1619 04/11/18 1619      Row Name 04/11/18 1550             Pain Scale: Numbers Pre/Post-Treatment    Pain Scale: Numbers, Pretreatment 0/10 - no pain  -SJ      Pain Scale: Numbers, Post-Treatment 0/10 - no pain  -SJ      Pain Location chest  -SJ      Recorded by [SJ] Kinsey Golden, PT 04/11/18 1619 04/11/18 1619      Row Name                Wound 04/05/18 0825 Left leg incision    Wound - Properties Group Date first assessed: 04/05/18 [SV] Time first assessed: 0825 [SV] Side: Left [SV] Location: leg [SV] Type: incision [SV] Recorded by:  [SV] Debora Fishman RN 04/05/18 0825 04/05/18 0825    Row Name                Wound 04/05/18 0825 Other (See comments) chest incision    Wound - Properties Group Date first assessed: 04/05/18 [SV] Time first assessed: 0825 [SV] Side: Other  (See comments) [SV] Location: chest [SV] Type: incision [SV] Recorded by:  [SV] Debora Fishman RN 04/05/18 0825 04/05/18 0825    Row Name 04/11/18 1550             Coping    Observed Emotional State calm;cooperative  -SJ      Verbalized Emotional State acceptance  -SJ      Recorded by [SJ] Kinsey Golden, PT 04/11/18 1619 04/11/18 1619      Row Name 04/11/18 1550             Plan of Care Review    Plan of Care Reviewed With patient  -SJ      Recorded by [SJ] Kinsey Golden, PT 04/11/18 1619 04/11/18 1619      Row Name 04/11/18 1550             Outcome Summary/Treatment Plan (PT)    Daily Summary of Progress (PT) progress toward functional goals as expected  -SJ      Recorded by [SJ] Kinsey Golden, PT 04/11/18 1619 04/11/18 1619        User Key  (r) = Recorded By, (t) = Taken By, (c) = Cosigned By    Initials Name Effective Dates Discipline     Kinsey Golden, PT 06/19/15 -  PT    SV Debora Fishman RN 04/19/17 -  Nurse          Wound 04/05/18 0825 Left leg incision (Active)   Dressing Appearance open to air 4/11/2018  9:07 AM   Closure Open to air;Approximated 4/11/2018  9:07 AM   Drainage Amount none 4/11/2018  9:07 AM   Dressing Care, Wound open to air 4/11/2018  9:07 AM       Wound 04/05/18 0825 Other (See comments) chest incision (Active)   Dressing Appearance no drainage;open to air 4/11/2018  9:07 AM   Closure Open to air;Approximated 4/11/2018  9:07 AM   Drainage Amount none 4/11/2018  9:07 AM   Dressing Care, Wound open to air 4/11/2018  9:07 AM             Physical Therapy Education     Title: PT OT SLP Therapies (Done)     Topic: Physical Therapy (Done)     Point: Mobility training (Done)    Learning Progress Summary     Learner Status Readiness Method Response Comment Documented by    Patient Done Acceptance E VU,CHING  SJ 04/11/18 1622     Done Acceptance E VU  AS 04/09/18 1024     Active Acceptance E NR  KR 04/07/18 1025     Active Acceptance E NR  PEEWEE 04/06/18 1337          Point: Home  exercise program (Done)    Learning Progress Summary     Learner Status Readiness Method Response Comment Documented by    Patient Done Acceptance E ROBINSON,CHING   04/11/18 1622     Done Acceptance E VU  AS 04/09/18 1024     Active Acceptance E NR  KR 04/07/18 1025     Active Acceptance E NR  PEEWEE 04/06/18 1337          Point: Body mechanics (Done)    Learning Progress Summary     Learner Status Readiness Method Response Comment Documented by    Patient Done Acceptance E ROBINSON,CHING   04/11/18 1622     Done Acceptance E VU  AS 04/09/18 1024     Active Acceptance E NR  KR 04/07/18 1025     Active Acceptance E NR  PEEWEE 04/06/18 1337          Point: Precautions (Done)    Learning Progress Summary     Learner Status Readiness Method Response Comment Documented by    Patient Done Acceptance E VU,CHING   04/11/18 1622     Done Acceptance E VU  AS 04/09/18 1024     Active Acceptance E NR  KR 04/07/18 1025     Active Acceptance E NR  PEEWEE 04/06/18 1337                      User Key     Initials Effective Dates Name Provider Type Discipline     06/19/15 -  Dipti Mcintyre, PT Physical Therapist PT     06/19/15 -  Kinsey Golden, PT Physical Therapist PT    AS 06/22/15 -  Shamika Calle, PTA Physical Therapy Assistant PT    KR 04/03/18 -  Cari Nina, PT Physical Therapist PT                    PT Recommendation and Plan  Therapy Frequency (PT Clinical Impression): daily  Outcome Summary/Treatment Plan (PT)  Daily Summary of Progress (PT): progress toward functional goals as expected  Plan of Care Reviewed With: patient  Progress: improving  Outcome Summary: Pt dem independence with t/f's and ambulates 2 x200ft with SBA. Pt eager to go home.           Outcome Measures     Row Name 04/11/18 1550 04/09/18 0932          How much help from another person do you currently need...    Turning from your back to your side while in flat bed without using bedrails? 4  -SJ 3  -AS     Moving from lying on back to sitting on the side of a  flat bed without bedrails? 4  -SJ 3  -AS     Moving to and from a bed to a chair (including a wheelchair)? 4  -SJ 3  -AS     Standing up from a chair using your arms (e.g., wheelchair, bedside chair)? 4  -SJ 3  -AS     Climbing 3-5 steps with a railing? 4  -SJ 3  -AS     To walk in hospital room? 4  -SJ 3  -AS     AM-PAC 6 Clicks Score 24  -SJ 18  -AS        Functional Assessment    Outcome Measure Options AM-PAC 6 Clicks Basic Mobility (PT)  -SJ AM-PAC 6 Clicks Basic Mobility (PT)  -AS       User Key  (r) = Recorded By, (t) = Taken By, (c) = Cosigned By    Initials Name Provider Type    DEANA Golden PT Physical Therapist    AS Shamika Calle PTA Physical Therapy Assistant           Time Calculation:         PT Charges     Row Name 04/11/18 1622             Time Calculation    Start Time 1550  -SJ      PT Received On 04/11/18  -      PT Goal Re-Cert Due Date 04/16/18  -         Time Calculation- PT    Total Timed Code Minutes- PT 23 minute(s)  -        User Key  (r) = Recorded By, (t) = Taken By, (c) = Cosigned By    Initials Name Provider Type    SJ Kinsey Golden, PT Physical Therapist          Therapy Charges for Today     Code Description Service Date Service Provider Modifiers Qty    31068229617 HC GAIT TRAINING EA 15 MIN 4/11/2018 Kinsey Golden, PT GP 1    39792816377 HC PT THER PROC EA 15 MIN 4/11/2018 Kinsey Golden PT GP 1          PT G-Codes  Outcome Measure Options: AM-PAC 6 Clicks Basic Mobility (PT)    Kinsey Golden PT  4/11/2018

## 2018-04-11 NOTE — PLAN OF CARE
Problem: Patient Care Overview  Goal: Plan of Care Review  Outcome: Ongoing (interventions implemented as appropriate)   04/11/18 0857   Coping/Psychosocial   Plan of Care Reviewed With patient   Plan of Care Review   Progress improving   OTHER   Outcome Summary Pt up to chair for shift. restarted on IV amio for Afib RVR. NSR throughout shift. ambulated x2 today. no complaints of pain. possible DC home tomorrow.       Problem: Skin Injury Risk (Adult)  Goal: Identify Related Risk Factors and Signs and Symptoms  Outcome: Ongoing (interventions implemented as appropriate)    Goal: Skin Health and Integrity  Outcome: Ongoing (interventions implemented as appropriate)      Problem: Cardiac Surgery (Adult)  Goal: Signs and Symptoms of Listed Potential Problems Will be Absent, Minimized or Managed (Cardiac Surgery)  Outcome: Ongoing (interventions implemented as appropriate)      Problem: Pain, Acute (Adult)  Goal: Identify Related Risk Factors and Signs and Symptoms  Outcome: Ongoing (interventions implemented as appropriate)    Goal: Acceptable Pain Control/Comfort Level  Outcome: Ongoing (interventions implemented as appropriate)      Problem: Fall Risk (Adult)  Goal: Absence of Fall  Outcome: Ongoing (interventions implemented as appropriate)      Problem: Arrhythmia/Dysrhythmia (Symptomatic) (Adult)  Goal: Signs and Symptoms of Listed Potential Problems Will be Absent, Minimized or Managed (Arrhythmia/Dysrhythmia)  Outcome: Ongoing (interventions implemented as appropriate)

## 2018-04-11 NOTE — PROGRESS NOTES
Continued Stay Note  [facility]     Patient Name: Kavon Waters Jr.  MRN: [id number]  Today's Date: 4/11/2018    Admit Date: 4/5/2018          Discharge Plan     Row Name 04/11/18 1032       Plan    Plan Home with spouse    Patient/Family in Agreement with Plan yes    Plan Comments Met with patient at bedside. Currently on room air. On amiodarone drip. Planning for discharge Thursday. Wife will transport patient home. Patient denies any discharge planning needs at this time. Kailey Freeman RN [phone]              Discharge Codes    No documentation.       Expected Discharge Date and Time     Expected Discharge Date Expected Discharge Time    Apr 11, 2018             Kailey Freeman RN

## 2018-04-12 VITALS
DIASTOLIC BLOOD PRESSURE: 74 MMHG | HEART RATE: 61 BPM | HEIGHT: 72 IN | RESPIRATION RATE: 16 BRPM | WEIGHT: 218 LBS | SYSTOLIC BLOOD PRESSURE: 133 MMHG | BODY MASS INDEX: 29.53 KG/M2 | TEMPERATURE: 98.4 F | OXYGEN SATURATION: 96 %

## 2018-04-12 LAB — GLUCOSE BLDC GLUCOMTR-MCNC: 130 MG/DL (ref 70–130)

## 2018-04-12 PROCEDURE — 82962 GLUCOSE BLOOD TEST: CPT

## 2018-04-12 PROCEDURE — 99024 POSTOP FOLLOW-UP VISIT: CPT | Performed by: PHYSICIAN ASSISTANT

## 2018-04-12 PROCEDURE — 25010000002 AMIODARONE IN DEXTROSE 5% 360-4.14 MG/200ML-% SOLUTION: Performed by: PHYSICIAN ASSISTANT

## 2018-04-12 RX ORDER — AMIODARONE HYDROCHLORIDE 200 MG/1
200 TABLET ORAL EVERY 8 HOURS
Qty: 48 TABLET | Refills: 0 | Status: SHIPPED | OUTPATIENT
Start: 2018-04-12 | End: 2018-04-12

## 2018-04-12 RX ORDER — ATORVASTATIN CALCIUM 40 MG/1
40 TABLET, FILM COATED ORAL NIGHTLY
Qty: 30 TABLET | Refills: 5 | Status: SHIPPED | OUTPATIENT
Start: 2018-04-12 | End: 2018-04-12

## 2018-04-12 RX ORDER — AMIODARONE HYDROCHLORIDE 200 MG/1
200 TABLET ORAL EVERY 8 HOURS
Qty: 48 TABLET | Refills: 0 | Status: SHIPPED | OUTPATIENT
Start: 2018-04-12 | End: 2018-04-19

## 2018-04-12 RX ORDER — AMIODARONE HYDROCHLORIDE 200 MG/1
200 TABLET ORAL EVERY 12 HOURS
Qty: 28 TABLET | Refills: 0 | Status: SHIPPED | OUTPATIENT
Start: 2018-04-19 | End: 2018-04-12

## 2018-04-12 RX ORDER — ATORVASTATIN CALCIUM 40 MG/1
40 TABLET, FILM COATED ORAL NIGHTLY
Qty: 30 TABLET | Refills: 5 | Status: SHIPPED | OUTPATIENT
Start: 2018-04-12 | End: 2018-11-30

## 2018-04-12 RX ORDER — AMIODARONE HYDROCHLORIDE 200 MG/1
200 TABLET ORAL DAILY
Qty: 30 TABLET | Refills: 5 | Status: SHIPPED | OUTPATIENT
Start: 2018-05-03 | End: 2018-11-30

## 2018-04-12 RX ORDER — AMIODARONE HYDROCHLORIDE 200 MG/1
200 TABLET ORAL DAILY
Qty: 30 TABLET | Refills: 5 | Status: SHIPPED | OUTPATIENT
Start: 2018-05-03 | End: 2018-04-12

## 2018-04-12 RX ORDER — AMIODARONE HYDROCHLORIDE 200 MG/1
200 TABLET ORAL EVERY 12 HOURS
Qty: 28 TABLET | Refills: 0 | Status: SHIPPED | OUTPATIENT
Start: 2018-04-19 | End: 2018-04-26

## 2018-04-12 RX ADMIN — APIXABAN 5 MG: 5 TABLET, FILM COATED ORAL at 08:11

## 2018-04-12 RX ADMIN — AMIODARONE HYDROCHLORIDE 0.5 MG/MIN: 1.8 INJECTION, SOLUTION INTRAVENOUS at 02:52

## 2018-04-12 RX ADMIN — AMIODARONE HYDROCHLORIDE 200 MG: 200 TABLET ORAL at 06:27

## 2018-04-12 RX ADMIN — ASPIRIN 81 MG: 81 TABLET, COATED ORAL at 08:11

## 2018-04-12 RX ADMIN — CLOPIDOGREL BISULFATE 75 MG: 75 TABLET ORAL at 08:11

## 2018-04-12 RX ADMIN — LIOTHYRONINE SODIUM 5 MCG: 5 TABLET ORAL at 08:10

## 2018-04-12 RX ADMIN — METOPROLOL TARTRATE 25 MG: 25 TABLET ORAL at 08:11

## 2018-04-12 RX ADMIN — LEVOTHYROXINE SODIUM 50 MCG: 50 TABLET ORAL at 06:27

## 2018-04-12 NOTE — PROGRESS NOTES
"Adult Nutrition  Assessment/PES    Patient Name:  Kavon Waters Jr.  YOB: 1950  MRN: [id number]  Admit Date:  4/5/2018    Assessment Date:  4/12/2018    Comments:            Adult Nutrition Assessment     Row Name 04/12/18 0802       Nutrition Prescription PO    Current PO Diet Regular    Common Modifiers Consistent Carbohydrate    Row Name 04/12/18 0801       Reason for Assessment    Reason For Assessment other (see comments)   LOS/5\"    Row Name 04/12/18 0500       Anthropometrics    Weight 98.9 kg (218 lb)          Problem/Interventions:        Problem 1     Row Name 04/12/18 0802       Nutrition Diagnoses Problem 1    Problem 1 Nutrition Appropriate for Condition at this Time    Signs/Symptoms (evidenced by) PO Intake    Percent (%) intake recorded 92 %    Over number of meals 3                          Education/Evaluation     Row Name 04/12/18 0802       Monitor/Evaluation    Monitor Per protocol        Electronically signed by:  Fide Cheung RD  04/12/18 8:02 AM  "

## 2018-04-12 NOTE — PROGRESS NOTES
[facility]       LOS: 7 days   Patient Care Team:  Mina Story MD as PCP - General        Subjective       Patient Denies:  Cp, sob, palpitations.        Vital Signs  Temp:  [98.2 °F (36.8 °C)-98.3 °F (36.8 °C)] 98.3 °F (36.8 °C)  Heart Rate:  [62-75] 67  Resp:  [16-18] 16  BP: (126-143)/(74-86) 133/74    Intake/Output Summary (Last 24 hours) at 04/12/18 0858  Last data filed at 04/12/18 0500   Gross per 24 hour   Intake             1200 ml   Output                0 ml   Net             1200 ml     No intake/output data recorded.    Physical Exam:     General Appearance:    Alert, cooperative, in no acute distress       Neck:   No adenopathy, supple, trachea midline, no thyromegaly, no JVD       Lungs:     Clear to auscultation,respirations regular, even and                  unlabored    Heart:    Regular rhythm and normal rate, normal S1 and S2, no            murmur, no gallop, no rub, no click   Chest Wall:    No abnormalities observed   Abdomen:     Normal bowel sounds, no masses, no organomegaly, soft        non-tender, non-distended       Extremities:   Moves all extremities well, no edema, no cyanosis, no             redness   Pulses:   Pulses palpable and equal bilaterally     Results Review:     I reviewed the patient's new clinical results.      WBC No results found for: WBC         HGB No results found for: HGB        HCT No results found for: HCT         Platlets No results found for: LABPLAT  Sodium  No results found for: NA  Potassium  No results found for: K  Chloride  No results found for: CL  BicarbonateNo results found for: PLASMABICARB    BUN No results found for: BUN   Creatinine No results found for: CREATININE   Calcium No results found for: CALCIUM   Mag No results found for: MG        PT/INR:  No results found for: PROTIME/No results found for: INR  Troponin I   No results found for: CKTOTAL, CKMB, CKMBINDEX, TROPONINI,  TROPONINT      amiodarone 200 mg Oral Q8H   Followed by      [START ON 4/19/2018] amiodarone 200 mg Oral Q12H   Followed by      [START ON 5/3/2018] amiodarone 200 mg Oral Daily   apixaban 5 mg Oral Q12H   aspirin 81 mg Oral Daily   atorvastatin 40 mg Oral Nightly   clopidogrel 75 mg Oral Daily   insulin lispro 0-7 Units Subcutaneous 4x Daily With Meals & Nightly   levothyroxine 50 mcg Oral Q AM   liothyronine 5 mcg Oral BID   metoprolol tartrate 25 mg Oral Q12H   pharmacy consult - MTM  Does not apply Daily          Assessment/Plan     Patient Active Problem List   Diagnosis Code   • Unstable angina I20.0   • Angina at rest I20.8   • Coronary artery disease involving native coronary artery of native heart with angina pectoris I25.119   • Peripheral arterial disease I73.9   • Three-vessel Coronary artery disease with 99% occluded stents to LAD, circumflex, RCA I25.10   • S/P CABG x 2. 4/5/18 Z95.1   • Hypertension I10   • Hyperlipidemia E78.5   • GERD (gastroesophageal reflux disease) K21.9   • History of prostate Cancer s/p radiation therapy C80.1   • History of left corona radiata CVA 2015. No residual and no occlusive carotid artery disease I63.9     CV stable  Currently in normal sinus rhythm  Outpatient consult with Dr. Kauffman arranged due to long-standing history of paroxysmal atrial fibrillation.  Continue Eliquis  [facility]  F/u with GINNY Mujica  04/12/18  8:58 AM

## 2018-04-12 NOTE — PROGRESS NOTES
CTS Progress Note      POD #7 s/p CABG x2       Subjective  Ambulating well. Eager to go home. NSR overnight.    Objective    Physical Exam:   Vital Signs   Temp:  [98.2 °F (36.8 °C)-98.3 °F (36.8 °C)] 98.3 °F (36.8 °C)  Heart Rate:  [62-75] 75  Resp:  [16-18] 16  BP: (126-143)/(74-86) 143/85   GEN: Alert, oriented, no acute distress   RESP: Clear to auscultation bilaterally     CV: Regular rate and rhythm with no murmurs, rubs or gallops.  Sternum stable.   ABD: Soft, nontender with normoactive bowel sounds    SKIN: Incision c/d/i    Intake/Output Summary (Last 24 hours) at 04/12/18 0715  Last data filed at 04/12/18 0500   Gross per 24 hour   Intake             1320 ml   Output                0 ml   Net             1320 ml     Results       Results from last 7 days  Lab Units 04/08/18  0511   WBC 10*3/mm3 8.88   HEMOGLOBIN g/dL 9.8*   HEMATOCRIT % 29.6*   PLATELETS 10*3/mm3 168       Results from last 7 days  Lab Units 04/09/18  0445   SODIUM mmol/L 134   POTASSIUM mmol/L 3.7   CHLORIDE mmol/L 98*   CO2 mmol/L 28.0   BUN mg/dL 28*   CREATININE mg/dL 1.30   GLUCOSE mg/dL 113*   CALCIUM mg/dL 8.9       Results from last 7 days  Lab Units 04/06/18  0412 04/05/18  1058   INR  1.13* 1.21*   APTT seconds  --  30.1         Assessment/Plan     Principal Problem:    Three-vessel Coronary artery disease with 99% occluded stents to LAD, circumflex, RCA  Active Problems:    Unstable angina    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 No residual and no occlusive carotid artery disease        Plan   Converted out of A. fib and in sinus rhythm on amiodarone drip, per cardiology  Ambulate  Aggressive pulmonary toilet    Discharge home per cardiology's decision    Shaista Mary PA-C  04/12/18  7:15 AM

## 2018-04-16 NOTE — DISCHARGE SUMMARY
CTS Discharge Summary    Patient Care Team:  Mina Story MD as PCP - General  Consults:   Consults     No orders found from 3/7/2018 to 4/6/2018.          Date of Admission: 4/5/2018  5:38 AM  Date of Discharge:  4/12/18    Discharge Diagnosis  Past Medical History:   Diagnosis Date   • Arrhythmia    • Arthritis    • AV block, 1st degree    • Borderline diabetes    • Cancer     PROSTATE.  HAD 43 RADIATION TREATMENTS.   • Constipation    • Coronary artery disease    • Disease of thyroid gland    • Eczema    • Full dentures    • GERD (gastroesophageal reflux disease)    • H/O heart artery stent     X 7   • Hyperlipidemia    • Hypertension    • Low sodium levels    • PAUL (obstructive sleep apnea)     NO CPAP USE   • RBBB    • Seasonal allergies    • Skin cancer    • Stroke     AFFECTED RIGHT ARM/LEG.  PT DENIES RESIDUAL DEFICITS.   • Wears glasses      Unstable angina [I20.0]  Angina at rest [I20.8]  Coronary artery disease [I25.10]     Procedures Performed  Procedure(s):  MEDIASTERNOTOMY CORONARY ARTERY BYPASS GRAFT X2 UTILIZING LEFT INTERNAL MAMMARY ARTERY, EVH LEFT GREATER SAPHENOUS VEIN       History of Present Illness  Patient is a 67 y.o. male  Who has had multiple previous coronary artery stents in the past and has had some cardiac arrhythmias.  He has also had some recent shortness of breath with minimal activity and some very mild substernal pain radiating to the base of the neck.  At first he thought he may be just having indigestion and not cardiac pain.  He was referred to Dr. Ahn for evaluation and catheterization demonstrated coronary disease with significant in-stent stenosis.  The patient was on Plavix.  He has stopped his Plavix at this time and is seeing me in the office to discuss surgery.  He is pain-free in the office at this time and breathing unlabored. He states that he wakes at night 3 times or 4 times with cramping in his calf muscles and has attributed this to his low sodium levels.   However, I'm suspicious that there may be some underlying vascular disease in the superficial femoral arteries.         Hospital Course  Patient was taken to the operating room on 4/5/18 for CABG x2 with endoscopic vein harvest.  Patient was transported to cardiac ICU intubated and in stable condition. Patient extubated per ICU protocol.  POD 1:  [place] catheter removed.  Up in chair.  Bump in creatinine.  Tmax 101.7.  POD 2:  Chest tubes and pacing wires removed.  Plavix started.  Transferred to telemetry. Ambulating  POD 3:  Amio initiated for afib.  POD 4:  Transition to PO amio.  On Eliquis.    POD 5:  Improving.  NSR.  POD 6:  Afib with RVR overnight.   POD 7:  NSR.  Discharged home.  To follow up with Dr. Kauffman outpatient for afib.      Discharge Medications   Kavon Waters Jr.   Home Medication Instructions BILLY:[id number]    Printed on:04/16/18 4578   Medication Information                      amiodarone (PACERONE) 200 MG tablet  Take 1 tablet by mouth Every 8 (Eight) Hours for 20 doses.Take 1 tablet by mouth Every 12 (Twelve) Hours for 28 doses.             amiodarone (PACERONE) 200 MG tablet  Take 1 tablet by mouth Every 12 (Twelve) Hours for 28 doses.             amiodarone (PACERONE) 200 MG tablet  Take 1 tablet by mouth Daily.             apixaban (ELIQUIS) 5 MG tablet tablet  Take 1 tablet by mouth Every 12 (Twelve) Hours.             aspirin 81 MG chewable tablet  Chew 81 mg Daily.             atorvastatin (LIPITOR) 40 MG tablet  Take 1 tablet by mouth Every Night.             clopidogrel (PLAVIX) 75 MG tablet  Take 75 mg by mouth Daily.             docusate sodium (COLACE) 100 MG capsule  Take 300 mg by mouth Daily.             fexofenadine (ALLEGRA) 180 MG tablet  Take 180 mg by mouth 2 (Two) Times a Day.             fluticasone (FLONASE) 50 MCG/ACT nasal spray  1 spray into each nostril Daily.             HYDROcodone-acetaminophen (NORCO) 5-325 MG per tablet  Take 1 tablet by mouth Every 6  (Six) Hours As Needed for Moderate Pain .             levothyroxine (SYNTHROID, LEVOTHROID) 50 MCG tablet  Take 50 mcg by mouth Daily.             liothyronine (CYTOMEL) 5 MCG tablet  Take 5 mcg by mouth 2 (Two) Times a Day.             lubiprostone (AMITIZA) 24 MCG capsule  Take 24 mcg by mouth 2 (Two) Times a Day With Meals.             metoprolol tartrate (LOPRESSOR) 25 MG tablet  Take 1 tablet by mouth Every 12 (Twelve) Hours.             montelukast (SINGULAIR) 10 MG tablet  Take 10 mg by mouth Every Night.             Omega-3-6-9 capsule  Take 1 tablet by mouth 3 (Three) Times a Day.             raNITIdine (ZANTAC) 150 MG tablet  Take 300 mg by mouth 2 (Two) Times a Day.             sodium chloride 1 g tablet  Take 1 g by mouth 2 (Two) Times a Day.                 Discharge Diet:   Diet Instructions     Diet: Cardiac, Consistent Carbohydrate       Discharge Diet:   Cardiac  Consistent Carbohydrate             Activity at Discharge:   Activity Instructions     Discharge Activity Restrictions       1) No driving for 4 weeks and no longer taking narcotics.   2) Do not lift / push / pull more than 10 lbs.          Follow-up Appointments  Future Appointments  Date Time Provider Department Center   4/26/2018 11:00 AM ANTIONETTE Negron [facility]   5/3/2018 1:00 PM GINNY Nguyen [facility] None           Shaista Mary PA-C  04/16/18  3:42 PM

## 2018-04-26 ENCOUNTER — OFFICE VISIT (OUTPATIENT)
Dept: CARDIOLOGY | Facility: HOSPITAL | Age: 68
End: 2018-04-26

## 2018-04-26 ENCOUNTER — HOSPITAL ENCOUNTER (OUTPATIENT)
Dept: CARDIOLOGY | Facility: HOSPITAL | Age: 68
Discharge: HOME | End: 2018-04-26
Admitting: NURSE PRACTITIONER

## 2018-04-26 VITALS
HEART RATE: 63 BPM | BODY MASS INDEX: 28.85 KG/M2 | OXYGEN SATURATION: 100 % | TEMPERATURE: 97.4 F | WEIGHT: 213 LBS | SYSTOLIC BLOOD PRESSURE: 136 MMHG | RESPIRATION RATE: 16 BRPM | DIASTOLIC BLOOD PRESSURE: 79 MMHG | HEIGHT: 72 IN

## 2018-04-26 DIAGNOSIS — I10 ESSENTIAL HYPERTENSION: ICD-10-CM

## 2018-04-26 DIAGNOSIS — I48.0 PAF (PAROXYSMAL ATRIAL FIBRILLATION) (HCC): Primary | ICD-10-CM

## 2018-04-26 DIAGNOSIS — I48.0 PAF (PAROXYSMAL ATRIAL FIBRILLATION) (HCC): ICD-10-CM

## 2018-04-26 DIAGNOSIS — I25.10 CORONARY ARTERY DISEASE INVOLVING NATIVE CORONARY ARTERY OF NATIVE HEART WITHOUT ANGINA PECTORIS: ICD-10-CM

## 2018-04-26 DIAGNOSIS — I63.9 CEREBROVASCULAR ACCIDENT (CVA), UNSPECIFIED MECHANISM (HCC): ICD-10-CM

## 2018-04-26 PROCEDURE — 99024 POSTOP FOLLOW-UP VISIT: CPT | Performed by: NURSE PRACTITIONER

## 2018-04-26 PROCEDURE — 93005 ELECTROCARDIOGRAM TRACING: CPT | Performed by: NURSE PRACTITIONER

## 2018-04-26 PROCEDURE — 93010 ELECTROCARDIOGRAM REPORT: CPT | Performed by: INTERNAL MEDICINE

## 2018-04-26 NOTE — PROGRESS NOTES
"  Subjective:     Encounter Date:04/26/2018      Patient ID: Kavon Waters Jr. is a 67 y.o. male.    Chief Complaint: post op CABG afib follow up     History of Present Illness:  Mr. Waters comes in to the Afib clinic today for short interval follow up on post op afib.  He underwent CABG x 2 per Dr. Mittal on 4/5/18. On POD #3 he developed afib then again on POD #5.  DC'd home with amiodarone decreasing doses, Eliquis, ASA, Plavix, lopressor.      Prior to this hospitalization, he had experienced PAF in the past.  He also is s/p multiple coronary artery stents and hemorrhagic CVA 2015.      Patient overall states he is feeling a little better every day.  His only complaint is feeling tired.  He is napping in the afternoons 1-2 hours and is sleeping poorly at night.  He is ambulating frequently.  He states \"soreness\" is getting better.  Chronic constipation continues to be well controlled on his usual medication regimen.  He reports no palpitations, SOA, dizziness, chills or fevers.    He is scheduled to see PCP, Dr. Story on 5/10 then Dr. Hawk on 5/30.  Dr. Ahn referred him to Dr. Abdiaziz Kauffman @ discharge for consultation about PAF.  This is scheduled for 6/28/18.      Past Medical History:   Diagnosis Date   • Arrhythmia    • Arthritis    • AV block, 1st degree    • Borderline diabetes    • Cancer     PROSTATE.  HAD 43 RADIATION TREATMENTS.   • Constipation    • Coronary artery disease    • Disease of thyroid gland    • Eczema    • Full dentures    • GERD (gastroesophageal reflux disease)    • H/O heart artery stent     X 7   • Hyperlipidemia    • Hypertension    • Low sodium levels    • PAUL (obstructive sleep apnea)     NO CPAP USE   • RBBB    • Seasonal allergies    • Skin cancer    • Stroke     AFFECTED RIGHT ARM/LEG.  PT DENIES RESIDUAL DEFICITS.   • Wears glasses        Past Surgical History:   Procedure Laterality Date   • CARDIAC CATHETERIZATION     • CARDIAC CATHETERIZATION N/A 3/30/2018    " Procedure: Left Heart Cath;  Surgeon: Moshe Ahn MD;  Location:  [facility];  Service: Cardiovascular   • CHOLECYSTECTOMY     • COLONOSCOPY     • CORONARY ARTERY BYPASS GRAFT N/A 4/5/2018    Procedure: MEDIASTERNOTOMY CORONARY ARTERY BYPASS GRAFT X2 UTILIZING LEFT INTERNAL MAMMARY ARTERY, EVH LEFT GREATER SAPHENOUS VEIN;  Surgeon: Rakesh Mittal MD;  Location:  [facility];  Service: Cardiothoracic   • CORONARY STENT PLACEMENT     • SKIN CANCER EXCISION         Social History     Social History   • Marital status:      Spouse name: N/A   • Number of children: 2   • Years of education: N/A     Occupational History   • 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     Social History Main Topics   • Smoking status: Former Smoker     Packs/day: 3.00     Years: 15.00     Types: Cigarettes     Quit date: 4/2/1988   • Smokeless tobacco: Never Used      Comment: QUIT 30 YEARS AGO   • Alcohol use No   • Drug use: No   • Sexual activity: Defer     Other Topics Concern   • Not on file     Social History Narrative    Caffeine: 3 cups coffee daily           Family History   Problem Relation Age of Onset   • Kidney failure Mother    • Hypertension Mother    • Coronary artery disease Mother    • Coronary artery disease Father    • Cancer Father        Review of Systems   Constitution: Positive for malaise/fatigue. Negative for chills, decreased appetite, diaphoresis, fever, weakness, night sweats, weight gain and weight loss.   HENT: Negative for congestion and nosebleeds.    Eyes: Negative for blurred vision, visual disturbance and visual halos.   Cardiovascular: Positive for chest pain (from surgery) and irregular heartbeat. Negative for claudication, cyanosis, dyspnea on exertion, leg swelling, near-syncope, orthopnea, palpitations, paroxysmal nocturnal dyspnea and syncope.   Respiratory: Positive for cough and snoring. Negative for hemoptysis, shortness of breath, sleep disturbances  "due to breathing, sputum production and wheezing.    Endocrine: Positive for cold intolerance. Negative for heat intolerance, polydipsia, polyphagia and polyuria.   Hematologic/Lymphatic: Does not bruise/bleed easily.   Skin: Negative for dry skin, itching and rash.   Musculoskeletal: Negative for falls, joint pain, joint swelling, muscle weakness and myalgias.   Gastrointestinal: Positive for constipation. Negative for bloating, abdominal pain, diarrhea, dysphagia, heartburn, melena, nausea and vomiting.   Genitourinary: Negative for dysuria, flank pain, hematuria and nocturia.   Neurological: Positive for excessive daytime sleepiness. Negative for difficulty with concentration, dizziness, headaches and loss of balance.   Psychiatric/Behavioral: Negative for altered mental status and depression. The patient is not nervous/anxious.    Allergic/Immunologic: Positive for environmental allergies.     Vitals:    04/26/18 1111 04/26/18 1113 04/26/18 1114   BP: 127/74 149/78 136/79   BP Location: Right arm Left arm Left arm   Patient Position: Sitting Sitting Standing   Pulse: 64 64 63   Resp: 16     Temp: 97.4 °F (36.3 °C)     TempSrc: Temporal Artery      SpO2: 100%     Weight: 96.6 kg (213 lb)     Height: 182.9 cm (72\")         Objective:     Physical Exam   Constitutional: He is oriented to person, place, and time. He appears well-developed and well-nourished. No distress.   HENT:   Head: Normocephalic and atraumatic.   Eyes: Conjunctivae are normal. Pupils are equal, round, and reactive to light. No scleral icterus.   Neck: Neck supple. No JVD present.   Cardiovascular: Normal rate, regular rhythm, normal heart sounds and intact distal pulses.  Exam reveals no gallop and no friction rub.    No murmur heard.  Pulmonary/Chest: Effort normal and breath sounds normal. No respiratory distress. He has no wheezes. He has no rales. He exhibits no tenderness.   Abdominal: Soft. Bowel sounds are normal. He exhibits no " distension.   Musculoskeletal: Normal range of motion. He exhibits edema.   Trace bilateral lower extremity edema (knees to feet)   Neurological: He is alert and oriented to person, place, and time. No cranial nerve deficit.   Skin: Skin is warm and dry.   Mid-sternal incision, MT sites, and EVH site are all well approximated without exudate, tenderness, or redness.   Psychiatric: He has a normal mood and affect. His behavior is normal.       Lab Review: EKG in clinic has SR with first degree AVB, RBBB.  WY is 222 ms.   ms/ QTc 506 ms.     Assessment/ Plan:          Diagnosis Plan   1. PAF (paroxysmal atrial fibrillation)  CHADS Vasc = 5.  ASA, Eliquis, Plavix (due to prior stents and PVD).  Reviewed s/sx bleeding to report.  Patient has just reduced amiodarone from BID to Daily as of yesterday.     QT is slightly prolonged @ 502 ms.  Recommend he continue amiodarone 200 mg daily dosing and check repeat EKG next week w/ PCP.  If QT remains prolonged, would further reduce dose to 100 mg daily.  Follow up with Primary Cardiology, Dr. Hawk,  in May then EP in June.         2. Coronary artery disease s/p CABG x 2 Recovering well at home.  All incisions appear to be healing w/o signs of infection.  Continue to ambulate regularly.  He plans to attend Cardiac Rehab in [place].  Reviewed sternal precautions.  ASA, statin, BB compliance.  Follow up with [facility] 5/3/18.     3. Essential hypertension  Controlled.  He may decrease BP check to daily.     4. Cerebrovascular accident (CVA) 2015 Patient continues to monitor BP and s/sx bleeding r/t anticoagulation.

## 2018-05-03 ENCOUNTER — OFFICE VISIT (OUTPATIENT)
Dept: CARDIAC SURGERY | Facility: CLINIC | Age: 68
End: 2018-05-03

## 2018-05-03 VITALS
SYSTOLIC BLOOD PRESSURE: 134 MMHG | DIASTOLIC BLOOD PRESSURE: 80 MMHG | HEIGHT: 72 IN | BODY MASS INDEX: 28.85 KG/M2 | HEART RATE: 67 BPM | TEMPERATURE: 98.6 F | WEIGHT: 213 LBS

## 2018-05-03 DIAGNOSIS — Z95.1 S/P CABG (CORONARY ARTERY BYPASS GRAFT): Primary | ICD-10-CM

## 2018-05-03 PROCEDURE — 99024 POSTOP FOLLOW-UP VISIT: CPT | Performed by: PHYSICIAN ASSISTANT

## 2018-05-03 PROCEDURE — 71046 X-RAY EXAM CHEST 2 VIEWS: CPT | Performed by: THORACIC SURGERY (CARDIOTHORACIC VASCULAR SURGERY)

## 2018-05-03 NOTE — PROGRESS NOTES
05/03/2018  Patient Information  Kavon Waters Jr.                                                                                          [address] [address]   1950  'PCP/Referring Physician'  Mina Story MD  [phone]  No ref. provider found    Chief Complaint   Patient presents with   • Post-op     s/p CABG 4/5/18   • Post-op Open Heart Surgery       History of Present Illness:  Mr. Waters is a pleasant 67 year old  male who returns to clinic today for follow up 4 weeks status post CABG x2.  Patient reports that he has follow up scheduled with his PCP next week and he is scheduled to see Dr. Kauffman at the end of the month.  He reports feeling great and has no complaints today.  He reports mild shortness of breath with exertion and denies chest pain, fever, or chills.      Patient Active Problem List   Diagnosis   • Unstable angina   • Angina at rest   • Coronary artery disease involving native coronary artery of native heart with angina pectoris   • Peripheral arterial disease   • Three-vessel Coronary artery disease with 99% occluded stents to LAD, circumflex, RCA   • S/P CABG x 2. 4/5/18   • Hypertension   • Hyperlipidemia   • GERD (gastroesophageal reflux disease)   • History of prostate Cancer s/p radiation therapy   • History of left corona radiata CVA 2015. No residual and no occlusive carotid artery disease     Past Medical History:   Diagnosis Date   • Arrhythmia    • Arthritis    • AV block, 1st degree    • Borderline diabetes    • Cancer     PROSTATE.  HAD 43 RADIATION TREATMENTS.   • Constipation    • Coronary artery disease    • Disease of thyroid gland    • Eczema    • Full dentures    • GERD (gastroesophageal reflux disease)    • H/O heart artery stent     X 7   • Hyperlipidemia    • Hypertension    • Low sodium levels    • PAUL (obstructive sleep apnea)     NO CPAP USE   • RBBB    • Seasonal allergies    • Skin cancer    • Stroke     AFFECTED RIGHT  ARM/LEG.  PT DENIES RESIDUAL DEFICITS.   • Wears glasses      Past Surgical History:   Procedure Laterality Date   • CARDIAC CATHETERIZATION     • CARDIAC CATHETERIZATION N/A 3/30/2018    Procedure: Left Heart Cath;  Surgeon: Moshe Ahn MD;  Location:  [facility];  Service: Cardiovascular   • CHOLECYSTECTOMY     • COLONOSCOPY     • CORONARY ARTERY BYPASS GRAFT N/A 4/5/2018    Procedure: MEDIASTERNOTOMY CORONARY ARTERY BYPASS GRAFT X2 UTILIZING LEFT INTERNAL MAMMARY ARTERY, EVH LEFT GREATER SAPHENOUS VEIN;  Surgeon: Rakesh Mittal MD;  Location:  [facility];  Service: Cardiothoracic   • CORONARY STENT PLACEMENT     • SKIN CANCER EXCISION         Current Outpatient Prescriptions:   •  amiodarone (PACERONE) 200 MG tablet, Take 1 tablet by mouth Daily., Disp: 30 tablet, Rfl: 5  •  apixaban (ELIQUIS) 5 MG tablet tablet, Take 1 tablet by mouth Every 12 (Twelve) Hours., Disp: 60 tablet, Rfl: 5  •  aspirin 81 MG chewable tablet, Chew 81 mg Daily., Disp: , Rfl:   •  atorvastatin (LIPITOR) 40 MG tablet, Take 1 tablet by mouth Every Night., Disp: 30 tablet, Rfl: 5  •  clopidogrel (PLAVIX) 75 MG tablet, Take 75 mg by mouth Daily., Disp: , Rfl:   •  docusate sodium (COLACE) 100 MG capsule, Take 300 mg by mouth Daily., Disp: , Rfl:   •  fexofenadine (ALLEGRA) 180 MG tablet, Take 180 mg by mouth 2 (Two) Times a Day., Disp: , Rfl:   •  fluticasone (FLONASE) 50 MCG/ACT nasal spray, 1 spray into each nostril Daily., Disp: , Rfl:   •  HYDROcodone-acetaminophen (NORCO) 5-325 MG per tablet, Take 1 tablet by mouth Every 6 (Six) Hours As Needed for Moderate Pain ., Disp: 15 tablet, Rfl: 0  •  levothyroxine (SYNTHROID, LEVOTHROID) 50 MCG tablet, Take 50 mcg by mouth Daily., Disp: , Rfl:   •  liothyronine (CYTOMEL) 5 MCG tablet, Take 5 mcg by mouth 2 (Two) Times a Day., Disp: , Rfl:   •  lubiprostone (AMITIZA) 24 MCG capsule, Take 24 mcg by mouth 2 (Two) Times a Day With Meals., Disp: , Rfl:   •  metoprolol  "tartrate (LOPRESSOR) 25 MG tablet, Take 1 tablet by mouth Every 12 (Twelve) Hours., Disp: 60 tablet, Rfl: 5  •  montelukast (SINGULAIR) 10 MG tablet, Take 10 mg by mouth Every Night., Disp: , Rfl:   •  Omega-3-6-9 capsule, Take 1 tablet by mouth 3 (Three) Times a Day., Disp: , Rfl:   •  raNITIdine (ZANTAC) 150 MG tablet, Take 300 mg by mouth 2 (Two) Times a Day., Disp: , Rfl:   •  sodium chloride 1 g tablet, Take 1 g by mouth 2 (Two) Times a Day., Disp: , Rfl:   No Known Allergies  Social History     Social History   • Marital status:      Spouse name: N/A   • Number of children: 2   • Years of education: N/A     Occupational History   • 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     Family History   Problem Relation Age of Onset   • Kidney failure Mother    • Hypertension Mother    • Coronary artery disease Mother    • Coronary artery disease Father    • Cancer Father      Review of Systems   Constitution: Negative.   HENT: Negative.    Eyes: Negative.    Cardiovascular: Positive for dyspnea on exertion.   Respiratory: Negative.    Endocrine: Negative.    Hematologic/Lymphatic: Negative.    Skin: Negative.    Musculoskeletal: Negative.    Gastrointestinal: Negative.    Genitourinary: Negative.    Neurological: Negative.    Psychiatric/Behavioral: Negative.      Vitals:    05/03/18 1247   BP: 134/80   BP Location: Right arm   Patient Position: Sitting   Pulse: 67   Temp: 98.6 °F (37 °C)   Weight: 96.6 kg (213 lb)   Height: 182.9 cm (72\")      Physical Exam   Constitutional: He is oriented to person, place, " and time. He appears well-developed and well-nourished. No distress.   HENT:   Head: Normocephalic and atraumatic.   Eyes: Conjunctivae and EOM are normal. Pupils are equal, round, and reactive to light.   Neck: Normal range of motion. Neck supple. No tracheal deviation present.   Cardiovascular: Normal rate, regular rhythm and normal heart sounds.  Exam reveals no gallop and no friction rub.    No murmur heard.  Pulmonary/Chest: Effort normal and breath sounds normal. No respiratory distress. He has no wheezes.   Abdominal: Soft. Bowel sounds are normal. He exhibits no distension. There is no tenderness.   Musculoskeletal: Normal range of motion. He exhibits no edema.   Neurological: He is alert and oriented to person, place, and time.   Skin: Skin is warm and dry. No rash noted. No erythema.   Psychiatric: He has a normal mood and affect. His behavior is normal. Judgment and thought content normal.   Incision:  Well healed.  No erythema, fluctuance, drainage or signs of infection.  Sternum stable.    Labs/Imaging:  CXR on 5/3/18:  No bony or tissue abnormalities.  Sternal wires well positioned.  Sharp costophrenic angles.  No pneumothorax or pleural effusion.  No active disease process.    Assessment/Plan:  Mr. Waters was encouraged to continue to follow up with PCP and cardiologist.  All of his questions were answered and he appeared to understand. He was instructed that he may begin to drive short distances.  He has a satisfactory outcome following surgery and was instructed to return to this clinic as needed with any questions and/or concerns.        Patient Active Problem List   Diagnosis   • Unstable angina   • Angina at rest   • Coronary artery disease involving native coronary artery of native heart with angina pectoris   • Peripheral arterial disease   • Three-vessel Coronary artery disease with 99% occluded stents to LAD, circumflex, RCA   • S/P CABG x 2. 4/5/18   • Hypertension   • Hyperlipidemia   • GERD  (gastroesophageal reflux disease)   • History of prostate Cancer s/p radiation therapy   • History of left corona radiata CVA 2015. No residual and no occlusive carotid artery disease     Signed by:

## 2018-11-30 ENCOUNTER — OFFICE VISIT (OUTPATIENT)
Dept: NEUROSURGERY | Facility: CLINIC | Age: 68
End: 2018-11-30

## 2018-11-30 VITALS
HEIGHT: 72 IN | SYSTOLIC BLOOD PRESSURE: 132 MMHG | BODY MASS INDEX: 29.2 KG/M2 | DIASTOLIC BLOOD PRESSURE: 72 MMHG | TEMPERATURE: 97.4 F | WEIGHT: 215.6 LBS

## 2018-11-30 DIAGNOSIS — M54.2 NECK PAIN: Primary | ICD-10-CM

## 2018-11-30 PROBLEM — M54.9 BACK PAIN: Status: ACTIVE | Noted: 2018-11-30

## 2018-11-30 PROCEDURE — 99213 OFFICE O/P EST LOW 20 MIN: CPT | Performed by: PHYSICIAN ASSISTANT

## 2018-11-30 RX ORDER — BICALUTAMIDE 50 MG/1
TABLET, FILM COATED ORAL
Refills: 0 | COMMUNITY
Start: 2018-11-12 | End: 2022-08-23

## 2018-11-30 RX ORDER — EVOLOCUMAB 140 MG/ML
INJECTION, SOLUTION SUBCUTANEOUS
Refills: 1 | COMMUNITY
Start: 2018-11-23 | End: 2022-10-14

## 2018-11-30 RX ORDER — LOSARTAN POTASSIUM 100 MG/1
1 TABLET ORAL DAILY
COMMUNITY
Start: 2018-08-14 | End: 2022-05-23 | Stop reason: SDUPTHER

## 2018-11-30 RX ORDER — CYCLOBENZAPRINE HCL 10 MG
10 TABLET ORAL 2 TIMES DAILY PRN
Qty: 40 TABLET | Refills: 0 | Status: SHIPPED | OUTPATIENT
Start: 2018-11-30 | End: 2022-11-18 | Stop reason: SDUPTHER

## 2018-11-30 NOTE — PROGRESS NOTES
Subjective     Chief Complaint: neck pain    Patient ID: Kavon Waters Jr. is a 68 y.o. male is here today as a self referral.    History of Present Illness     Mr. Arevalo's 68-year-old left-handed retired gentleman with a PMH of A. fib, CAD, HLD, HTN, S/P CABG (4/5/2018).  He presents complaining of bilateral neck pain that has been present on and off for years.  He states it has progressively gotten worse over the last year.  Pain is located on either side of his upper neck, just below his ears with radiation into his bilateral upper shoulders, left greater than right.  He has associated stiffness of his neck and states he gets occasional left hand spasms when attempting to  items.     He participated in quite a bit of cardiac rehabilitation after his CABG earlier this year which included overhead weight exercises that exacerbated his neck pain.  The pain is worse at night when he is attending to sleep and she has to sleep on a soft pillow, keeping his neck straight to prevent exacerbation of spasm/pain.  The pain makes it difficult for him to do work around his house and is worse with driving.  He occasionally takes ibuprofen at night when the pain becomes more severe.  He did physical therapy for neck pain a few years ago without any relief.  He has also seen a chiropractor for this pain.    He has a history of chronic back pain . Additionally, the patient is being treated with Lupron for elevated PSA levels.  This medication makes him extremely fatigued during the day.    The following portions of the patient's history were reviewed and updated as appropriate: allergies, current medications, past family history, past medical history, past social history, past surgical history and problem list.    Family history:   Family History   Problem Relation Age of Onset   • Kidney failure Mother    • Hypertension Mother    • Coronary artery disease Mother    • Coronary artery disease Father    • Cancer Father       Past Medical History:   Diagnosis Date   • Arrhythmia    • Arthritis    • AV block, 1st degree    • Borderline diabetes    • Cancer (CMS/HCC)     PROSTATE.  HAD 43 RADIATION TREATMENTS.   • Constipation    • Coronary artery disease    • Disease of thyroid gland    • Eczema    • Full dentures    • GERD (gastroesophageal reflux disease)    • H/O heart artery stent     X 7   • Hyperlipidemia    • Hypertension    • Low sodium levels    • PAUL (obstructive sleep apnea)     NO CPAP USE   • RBBB    • Seasonal allergies    • Skin cancer    • Stroke (CMS/HCC)     AFFECTED RIGHT ARM/LEG.  PT DENIES RESIDUAL DEFICITS.   • Wears glasses        Social history:   Social History     Socioeconomic History   • Marital status:      Spouse name: Not on file   • Number of children: 2   • Years of education: Not on file   • Highest education level: Not on file   Social Needs   • Financial resource strain: Not on file   • Food insecurity - worry: Not on file   • Food insecurity - inability: Not on file   • Transportation needs - medical: Not on file   • Transportation needs - non-medical: Not on file   Occupational History   • Occupation: Construction for the [employer]     Comment: Retired   Tobacco Use   • Smoking status: Former Smoker     Packs/day: 3.00     Years: 15.00     Pack years: 45.00     Types: Cigarettes     Last attempt to quit: 1988     Years since quittin.6   • Smokeless tobacco: Never Used   • Tobacco comment: QUIT 30 YEARS AGO   Substance and Sexual Activity   • Alcohol use: No   • Drug use: No   • Sexual activity: Defer   Other Topics Concern   • Not on file   Social History Narrative    The patient lives with his wife in [place].       Review of Systems   Constitutional: Negative for activity change, appetite change, chills, diaphoresis, fatigue, fever and unexpected weight change.   HENT: Positive for postnasal drip, sinus pressure and tinnitus. Negative for congestion, dental problem,  "drooling, ear discharge, ear pain, facial swelling, hearing loss, mouth sores, nosebleeds, rhinorrhea, sneezing, sore throat, trouble swallowing and voice change.    Eyes: Negative for photophobia, pain, discharge, redness, itching and visual disturbance.   Respiratory: Positive for cough. Negative for apnea, choking, chest tightness, shortness of breath, wheezing and stridor.    Cardiovascular: Negative for chest pain, palpitations and leg swelling.   Gastrointestinal: Negative for abdominal distention, abdominal pain, anal bleeding, blood in stool, constipation, diarrhea, nausea, rectal pain and vomiting.   Endocrine: Negative for cold intolerance, heat intolerance, polydipsia, polyphagia and polyuria.   Genitourinary: Positive for difficulty urinating. Negative for decreased urine volume, dysuria, enuresis, flank pain, frequency, genital sores, hematuria and urgency.   Musculoskeletal: Positive for arthralgias, back pain, neck pain and neck stiffness. Negative for gait problem, joint swelling and myalgias.   Skin: Negative for color change, pallor, rash and wound.   Allergic/Immunologic: Positive for environmental allergies. Negative for food allergies and immunocompromised state.   Neurological: Positive for weakness and headaches. Negative for dizziness, tremors, seizures, syncope, facial asymmetry, speech difficulty, light-headedness and numbness.   Hematological: Negative for adenopathy. Does not bruise/bleed easily.   Psychiatric/Behavioral: Negative for agitation, behavioral problems, confusion, decreased concentration, dysphoric mood, hallucinations, self-injury, sleep disturbance and suicidal ideas. The patient is not nervous/anxious and is not hyperactive.    All other systems reviewed and are negative.      Objective   Blood pressure 132/72, temperature 97.4 °F (36.3 °C), temperature source Temporal, height 182.9 cm (72\"), weight 97.8 kg (215 lb 9.6 oz).  Body mass index is 29.24 kg/m².    Physical Exam "   Constitutional: He is oriented to person, place, and time. He appears well-developed and well-nourished. No distress.   HENT:   Head: Normocephalic and atraumatic.   Neck: Normal range of motion. No spinous process tenderness present. No tracheal deviation and no edema present.   Pulmonary/Chest: Effort normal.   Musculoskeletal:   Antalgic gait   Neurological: He is alert and oriented to person, place, and time. He has normal strength and normal reflexes. He displays normal reflexes. No cranial nerve deficit or sensory deficit. Gait abnormal. Coordination normal.   Reflex Scores:       Tricep reflexes are 2+ on the right side and 2+ on the left side.       Bicep reflexes are 2+ on the right side and 2+ on the left side.       Brachioradialis reflexes are 2+ on the right side and 2+ on the left side.       Patellar reflexes are 2+ on the right side and 2+ on the left side.  Negative Hoffmans  Antalgic gait    Skin: Skin is warm and dry.   Psychiatric: He has a normal mood and affect.         Assessment/Plan     Mr. Waters is a new patient who presents to us with chronic bilateral neck pain that occasionally radiates into his bilateral shoulders, left greater than right.  He has no physical exam findings concerning for cervical cord compression.  I would like for him to try some physical therapy.  I've also called in Flexeril to see if this might help with the spasm-like pain he is experiencing.  He will follow up in our office in 1 month with x-rays of the cervical spine.    Kavon was seen today for neck pain, back pain and fatigue.    Diagnoses and all orders for this visit:    Neck pain  -     XR spine cervical 2 or 3 vw; Future  -     Ambulatory Referral to Physical Therapy Evaluate and treat    Other orders  -     cyclobenzaprine (FLEXERIL) 10 MG tablet; Take 1 tablet by mouth 2 (Two) Times a Day As Needed for Muscle Spasms.        Return in about 5 weeks (around 1/4/2019).

## 2022-04-26 RX ORDER — LIOTHYRONINE SODIUM 5 UG/1
TABLET ORAL
Qty: 180 TABLET | Refills: 0 | Status: SHIPPED | OUTPATIENT
Start: 2022-04-26 | End: 2022-07-26

## 2022-04-26 RX ORDER — CLOPIDOGREL BISULFATE 75 MG/1
TABLET ORAL
Qty: 90 TABLET | Refills: 0 | Status: SHIPPED | OUTPATIENT
Start: 2022-04-26 | End: 2022-07-26

## 2022-04-26 NOTE — TELEPHONE ENCOUNTER
Rx Refill Note    Requested Prescriptions     Pending Prescriptions Disp Refills   • clopidogrel (PLAVIX) 75 MG tablet [Pharmacy Med Name: CLOPIDOGREL 75MG TABLETS] 90 tablet 0     Sig: TAKE 1 TABLET BY MOUTH EVERY DAY   • liothyronine (CYTOMEL) 5 MCG tablet [Pharmacy Med Name: LIOTHYRONINE 5MCG TABLETS] 180 tablet 0     Sig: TAKE 1 TABLET BY MOUTH TWICE DAILY        [facility]     Next office visit with prescribing clinician: 5/23/2022   Last labs:   Last refill: 02/01/22   Pharmacy (be sure to add in Epic). correct

## 2022-04-27 RX ORDER — OMEPRAZOLE 40 MG/1
CAPSULE, DELAYED RELEASE ORAL
Qty: 180 CAPSULE | Refills: 0 | Status: SHIPPED | OUTPATIENT
Start: 2022-04-27 | End: 2022-07-26

## 2022-04-27 NOTE — TELEPHONE ENCOUNTER
Rx Refill Note    Requested Prescriptions     Pending Prescriptions Disp Refills   • omeprazole (priLOSEC) 40 MG capsule [Pharmacy Med Name: OMEPRAZOLE 40MG CAPSULES] 180 capsule 0     Sig: TAKE 1 CAPSULE BY MOUTH TWICE DAILY BEFORE A MEAL        Last office visit with prescribing clinician: [facility] 02/08/22      Next office visit with prescribing clinician: 5/23/2022   Last labs:   Last refill: 02/01/22   Pharmacy (be sure to add in Epic). correct

## 2022-05-06 ENCOUNTER — TELEPHONE (OUTPATIENT)
Dept: FAMILY MEDICINE CLINIC | Facility: CLINIC | Age: 72
End: 2022-05-06

## 2022-05-06 NOTE — TELEPHONE ENCOUNTER
[facility]    REPATHA SURECLICK 140MG/ML AUTO INJ    KEY:  [id number]

## 2022-05-16 ENCOUNTER — LAB (OUTPATIENT)
Dept: FAMILY MEDICINE CLINIC | Facility: CLINIC | Age: 72
End: 2022-05-16

## 2022-05-16 DIAGNOSIS — E78.5 HYPERLIPIDEMIA, UNSPECIFIED HYPERLIPIDEMIA TYPE: ICD-10-CM

## 2022-05-16 DIAGNOSIS — R73.9 BLOOD GLUCOSE ELEVATED: Primary | ICD-10-CM

## 2022-05-16 DIAGNOSIS — E55.9 AVITAMINOSIS D: ICD-10-CM

## 2022-05-16 PROCEDURE — 36415 COLL VENOUS BLD VENIPUNCTURE: CPT | Performed by: FAMILY MEDICINE

## 2022-05-17 DIAGNOSIS — K59.00 CONSTIPATION, UNSPECIFIED CONSTIPATION TYPE: Primary | ICD-10-CM

## 2022-05-17 LAB
25(OH)D3+25(OH)D2 SERPL-MCNC: 60.4 NG/ML (ref 30–100)
ALBUMIN SERPL-MCNC: 4.5 G/DL (ref 3.7–4.7)
ALBUMIN/GLOB SERPL: 1.8 {RATIO} (ref 1.2–2.2)
ALP SERPL-CCNC: 67 IU/L (ref 44–121)
ALT SERPL-CCNC: 16 IU/L (ref 0–44)
AST SERPL-CCNC: 20 IU/L (ref 0–40)
BILIRUB SERPL-MCNC: 0.4 MG/DL (ref 0–1.2)
BUN SERPL-MCNC: 16 MG/DL (ref 8–27)
BUN/CREAT SERPL: 12 (ref 10–24)
CALCIUM SERPL-MCNC: 9.2 MG/DL (ref 8.6–10.2)
CHLORIDE SERPL-SCNC: 99 MMOL/L (ref 96–106)
CHOLEST SERPL-MCNC: 162 MG/DL (ref 100–199)
CK SERPL-CCNC: 47 U/L (ref 41–331)
CO2 SERPL-SCNC: 22 MMOL/L (ref 20–29)
CREAT SERPL-MCNC: 1.31 MG/DL (ref 0.76–1.27)
EGFRCR SERPLBLD CKD-EPI 2021: 58 ML/MIN/1.73
GLOBULIN SER CALC-MCNC: 2.5 G/DL (ref 1.5–4.5)
GLUCOSE SERPL-MCNC: 99 MG/DL (ref 65–99)
HBA1C MFR BLD: 5.8 % (ref 4.8–5.6)
HDLC SERPL-MCNC: 37 MG/DL
LDLC SERPL CALC-MCNC: 101 MG/DL (ref 0–99)
POTASSIUM SERPL-SCNC: 4.6 MMOL/L (ref 3.5–5.2)
PROT SERPL-MCNC: 7 G/DL (ref 6–8.5)
SODIUM SERPL-SCNC: 136 MMOL/L (ref 134–144)
TRIGL SERPL-MCNC: 135 MG/DL (ref 0–149)
TSH SERPL DL<=0.005 MIU/L-ACNC: 3.52 UIU/ML (ref 0.45–4.5)
VLDLC SERPL CALC-MCNC: 24 MG/DL (ref 5–40)

## 2022-05-18 RX ORDER — LUBIPROSTONE 24 UG/1
CAPSULE ORAL
Qty: 180 CAPSULE | Refills: 0 | Status: SHIPPED | OUTPATIENT
Start: 2022-05-18 | End: 2022-05-20 | Stop reason: SDUPTHER

## 2022-05-20 ENCOUNTER — TELEPHONE (OUTPATIENT)
Dept: FAMILY MEDICINE CLINIC | Facility: CLINIC | Age: 72
End: 2022-05-20

## 2022-05-20 DIAGNOSIS — K59.00 CONSTIPATION, UNSPECIFIED CONSTIPATION TYPE: ICD-10-CM

## 2022-05-20 RX ORDER — LUBIPROSTONE 24 UG/1
24 CAPSULE ORAL 2 TIMES DAILY WITH MEALS
Qty: 180 CAPSULE | Refills: 0 | Status: SHIPPED | OUTPATIENT
Start: 2022-05-20 | End: 2022-05-25 | Stop reason: SDUPTHER

## 2022-05-20 NOTE — TELEPHONE ENCOUNTER
Incoming Refill Request      Medication requested (name and dose): AMITIZA 24 MCG      [facility] where [facility]    Additional details provided by patient:     Best call back number: [phone]    Does the patient have less than a 3 day supply:  [x] Yes  [] No    Salma Hunter Rep  05/20/22, 08:41 EDT

## 2022-05-23 ENCOUNTER — OFFICE VISIT (OUTPATIENT)
Dept: FAMILY MEDICINE CLINIC | Facility: CLINIC | Age: 72
End: 2022-05-23

## 2022-05-23 VITALS
TEMPERATURE: 97.1 F | HEART RATE: 67 BPM | SYSTOLIC BLOOD PRESSURE: 172 MMHG | WEIGHT: 211.5 LBS | DIASTOLIC BLOOD PRESSURE: 92 MMHG | OXYGEN SATURATION: 98 % | BODY MASS INDEX: 28.03 KG/M2 | RESPIRATION RATE: 15 BRPM | HEIGHT: 73 IN

## 2022-05-23 DIAGNOSIS — F51.01 PRIMARY INSOMNIA: ICD-10-CM

## 2022-05-23 DIAGNOSIS — I25.10 CORONARY ARTERY DISEASE INVOLVING NATIVE HEART WITHOUT ANGINA PECTORIS, UNSPECIFIED VESSEL OR LESION TYPE: ICD-10-CM

## 2022-05-23 DIAGNOSIS — M54.50 CHRONIC MIDLINE LOW BACK PAIN WITHOUT SCIATICA: ICD-10-CM

## 2022-05-23 DIAGNOSIS — L03.313 CELLULITIS OF CHEST WALL: ICD-10-CM

## 2022-05-23 DIAGNOSIS — Z79.899 ENCOUNTER FOR LONG-TERM (CURRENT) USE OF OTHER MEDICATIONS: ICD-10-CM

## 2022-05-23 DIAGNOSIS — E03.9 ACQUIRED HYPOTHYROIDISM: Primary | ICD-10-CM

## 2022-05-23 DIAGNOSIS — R73.9 HYPERGLYCEMIA: ICD-10-CM

## 2022-05-23 DIAGNOSIS — E78.2 HYPERLIPIDEMIA, MIXED: ICD-10-CM

## 2022-05-23 DIAGNOSIS — G89.29 CHRONIC MIDLINE LOW BACK PAIN WITHOUT SCIATICA: ICD-10-CM

## 2022-05-23 DIAGNOSIS — N18.31 CHRONIC KIDNEY DISEASE, STAGE 3A: ICD-10-CM

## 2022-05-23 PROBLEM — R97.20 RAISED PROSTATE SPECIFIC ANTIGEN: Status: ACTIVE | Noted: 2017-11-28

## 2022-05-23 PROBLEM — R23.2 HOT FLASH DUE TO MEDICATION: Status: ACTIVE | Noted: 2018-11-18

## 2022-05-23 PROBLEM — Z19.1 HORMONE SENSITIVE PROSTATE CANCER: Status: ACTIVE | Noted: 2019-10-25

## 2022-05-23 PROBLEM — C61 HORMONE SENSITIVE PROSTATE CANCER: Status: ACTIVE | Noted: 2019-10-25

## 2022-05-23 PROBLEM — R53.83 FATIGUE: Status: ACTIVE | Noted: 2019-05-06

## 2022-05-23 PROBLEM — T50.905A HOT FLASH DUE TO MEDICATION: Status: ACTIVE | Noted: 2018-11-18

## 2022-05-23 PROBLEM — T80.90XA INJECTION SITE REACTION: Status: ACTIVE | Noted: 2018-10-18

## 2022-05-23 PROBLEM — E66.3 OVERWEIGHT WITH BODY MASS INDEX (BMI) 25.0-29.9: Status: ACTIVE | Noted: 2020-01-23

## 2022-05-23 PROBLEM — N48.1 BALANITIS: Status: ACTIVE | Noted: 2020-10-22

## 2022-05-23 PROBLEM — N18.9 CHRONIC KIDNEY DISEASE: Status: ACTIVE | Noted: 2018-11-18

## 2022-05-23 LAB
POC AMPHETAMINES: NEGATIVE
POC BARBITURATES: NEGATIVE
POC BENZODIAZEPHINES: NEGATIVE
POC COCAINE: NEGATIVE
POC METHADONE: NEGATIVE
POC METHAMPHETAMINE SCREEN URINE: NEGATIVE
POC OPIATES: NEGATIVE
POC OXYCODONE: NEGATIVE
POC PHENCYCLIDINE: NEGATIVE
POC PROPOXYPHENE: NEGATIVE
POC THC: NEGATIVE
POC TRICYCLIC ANTIDEPRESSANTS: NEGATIVE

## 2022-05-23 PROCEDURE — 80305 DRUG TEST PRSMV DIR OPT OBS: CPT | Performed by: FAMILY MEDICINE

## 2022-05-23 PROCEDURE — 99214 OFFICE O/P EST MOD 30 MIN: CPT | Performed by: FAMILY MEDICINE

## 2022-05-23 RX ORDER — LOSARTAN POTASSIUM 100 MG/1
100 TABLET ORAL DAILY
Qty: 90 TABLET | Refills: 3 | Status: SHIPPED | OUTPATIENT
Start: 2022-05-23

## 2022-05-23 RX ORDER — ZOLPIDEM TARTRATE 10 MG/1
10 TABLET ORAL
COMMUNITY
Start: 2022-04-26 | End: 2022-05-23 | Stop reason: SDUPTHER

## 2022-05-23 RX ORDER — CEFDINIR 300 MG/1
1 CAPSULE ORAL EVERY 12 HOURS
COMMUNITY
Start: 2022-05-19 | End: 2022-08-23

## 2022-05-23 RX ORDER — TRAMADOL HYDROCHLORIDE 50 MG/1
50 TABLET ORAL EVERY 12 HOURS PRN
COMMUNITY
Start: 2022-05-12 | End: 2022-05-23 | Stop reason: SDUPTHER

## 2022-05-23 RX ORDER — KETOCONAZOLE 20 MG/G
CREAM TOPICAL
COMMUNITY
End: 2022-11-23 | Stop reason: SDUPTHER

## 2022-05-23 RX ORDER — METHYLPREDNISOLONE 4 MG/1
TABLET ORAL
COMMUNITY
Start: 2022-05-19 | End: 2022-08-23

## 2022-05-23 RX ORDER — TRAMADOL HYDROCHLORIDE 50 MG/1
50 TABLET ORAL EVERY 12 HOURS PRN
Qty: 60 TABLET | Refills: 2 | Status: SHIPPED | OUTPATIENT
Start: 2022-05-23 | End: 2022-05-31 | Stop reason: SDUPTHER

## 2022-05-23 RX ORDER — ZOLPIDEM TARTRATE 10 MG/1
10 TABLET ORAL
Qty: 30 TABLET | Refills: 2 | Status: SHIPPED | OUTPATIENT
Start: 2022-05-23 | End: 2022-08-23 | Stop reason: SDUPTHER

## 2022-05-23 NOTE — ASSESSMENT & PLAN NOTE
GFR is 58.Patient is instructed to not take any NSAIDs.  Medicines as directed.  Stay well-hydrated.

## 2022-05-23 NOTE — ASSESSMENT & PLAN NOTE
She was seen by urgent treatment center.  Was started on antibiotic.  We will recheck in 1 week to make sure to improved.

## 2022-05-23 NOTE — PROGRESS NOTES
Patient Name: Kavon Waters Jr.  : 1950   MRN: [id number]     Chief Complaint:    Chief Complaint   Patient presents with   • Hypertension   • Diabetes   • lab results   • Rash     Possible reaction to medication        History of Present Illness: Kavon Waters Jr. is a 71 y.o. male who is here today for follow up.  HPI        Review of Systems:   Review of Systems   Constitutional: Negative.    HENT: Negative.    Eyes: Negative.    Respiratory: Negative.    Cardiovascular: Negative.    Gastrointestinal: Negative.    Neurological: Negative.         Past Medical History:   Past Medical History:   Diagnosis Date   • Acquired hypothyroidism    • Allergies    • Arrhythmia    • Arthritis    • Atrial fibrillation (HCC)    • AV block, 1st degree    • Borderline diabetes    • BPH associated with nocturia    • Chronic idiopathic constipation    • CKD (chronic kidney disease), stage III (HCC)    • Constipation    • Coronary artery disease involving native coronary artery of native heart without angina pectoris    • Depression    • Disease of thyroid gland    • Eczema    • Essential hypertension    • Full dentures    • GERD (gastroesophageal reflux disease)    • H/O heart artery stent     X 7   • High risk medication use    • Ischemic cardiomyopathy    • Long term (current) use of anticoagulants    • Low sodium levels    • Lower back pain    • Mixed hyperlipidemia    • PAUL (obstructive sleep apnea)     NO CPAP USE   • Paroxysmal A-fib (HCC)    • Primary osteoarthritis involving multiple joints    • Prostate cancer (HCC)     HAD 43 RADIATION TREATMENTS.   • Prostatic hypertrophy     Benign   • RBBB    • Recurrent major depressive disorder, in full remission (HCC)    • Seasonal allergies    • Skin cancer    • Stroke (HCC)     AFFECTED RIGHT ARM/LEG.  PT DENIES RESIDUAL DEFICITS.   • Thyroid disease    • Wears glasses        Past Surgical History:   Past Surgical History:   Procedure Laterality Date   • CARDIAC  CATHETERIZATION     • CARDIAC CATHETERIZATION N/A 2018    Procedure: Left Heart Cath;  Surgeon: Moshe Ahn MD;  Location:  [facility];  Service: Cardiovascular   • CHOLECYSTECTOMY     • CIRCUMCISION     • COLONOSCOPY  2013   • CORONARY ARTERY BYPASS GRAFT N/A 2018    Procedure: MEDIASTERNOTOMY CORONARY ARTERY BYPASS GRAFT X2 UTILIZING LEFT INTERNAL MAMMARY ARTERY, EVH LEFT GREATER SAPHENOUS VEIN;  Surgeon: Rakesh Mittal MD;  Location:  [facility];  Service: Cardiothoracic   • CORONARY STENT PLACEMENT     • SKIN CANCER EXCISION         Family History:   Family History   Problem Relation Age of Onset   • Kidney failure Mother         COMPLICATIONS OF DIALYSIS       Social History:   Social History     Socioeconomic History   • Marital status:    • Number of children: 2   Tobacco Use   • Smoking status: Former Smoker     Packs/day: 1.00     Years: 23.00     Pack years: 23.00     Types: Cigarettes     Start date:      Quit date: 1988     Years since quittin.1   • Smokeless tobacco: Never Used   • Tobacco comment: QUIT 30 YEARS AGO   Vaping Use   • Vaping Use: Never used   Substance and Sexual Activity   • Alcohol use: No   • Drug use: No   • Sexual activity: Defer       Medications:     Current Outpatient Medications:   •  aspirin 81 MG chewable tablet, Chew 81 mg Daily., Disp: , Rfl:   •  bicalutamide (CASODEX) 50 MG chemo tablet, , Disp: , Rfl: 0  •  cefdinir (OMNICEF) 300 MG capsule, Take 1 capsule by mouth Every 12 (Twelve) Hours., Disp: , Rfl:   •  clopidogrel (PLAVIX) 75 MG tablet, TAKE 1 TABLET BY MOUTH EVERY DAY, Disp: 90 tablet, Rfl: 0  •  cyclobenzaprine (FLEXERIL) 10 MG tablet, Take 1 tablet by mouth 2 (Two) Times a Day As Needed for Muscle Spasms., Disp: 40 tablet, Rfl: 0  •  fluticasone (FLONASE) 50 MCG/ACT nasal spray, 1 spray into each nostril Daily., Disp: , Rfl:   •  ketoconazole (NIZORAL) 2 % cream, ketoconazole 2 % topical cream   "APPLY TO THE AFFECTED AREA(S) BY TOPICAL ROUTE ONCE DAILY, Disp: , Rfl:   •  levothyroxine (SYNTHROID, LEVOTHROID) 50 MCG tablet, Take 50 mcg by mouth Daily., Disp: , Rfl:   •  liothyronine (CYTOMEL) 5 MCG tablet, TAKE 1 TABLET BY MOUTH TWICE DAILY, Disp: 180 tablet, Rfl: 0  •  losartan (COZAAR) 100 MG tablet, Take 1 tablet by mouth Daily., Disp: 90 tablet, Rfl: 3  •  lubiprostone (AMITIZA) 24 MCG capsule, Take 1 capsule by mouth 2 (Two) Times a Day With Meals., Disp: 180 capsule, Rfl: 0  •  methylPREDNISolone (MEDROL) 4 MG dose pack, follow package directions, Disp: , Rfl:   •  montelukast (SINGULAIR) 10 MG tablet, Take 10 mg by mouth Every Night., Disp: , Rfl:   •  omeprazole (priLOSEC) 40 MG capsule, TAKE 1 CAPSULE BY MOUTH TWICE DAILY BEFORE A MEAL, Disp: 180 capsule, Rfl: 0  •  raNITIdine (ZANTAC) 150 MG tablet, Take 300 mg by mouth 2 (Two) Times a Day., Disp: , Rfl:   •  REPATHA SURECLICK 140 MG/ML solution auto-injector, , Disp: , Rfl: 1  •  traMADol (ULTRAM) 50 MG tablet, Take 1 tablet by mouth Every 12 (Twelve) Hours As Needed for Moderate Pain ., Disp: 60 tablet, Rfl: 2  •  zolpidem (AMBIEN) 10 MG tablet, Take 1 tablet by mouth every night at bedtime., Disp: 30 tablet, Rfl: 2    Allergies:   Allergies   Allergen Reactions   • Celecoxib Unknown - High Severity   • Ibuprofen Unknown - High Severity   • Terfenadine Unknown - High Severity   • Tramadol Hcl Unknown - High Severity         Physical Exam:  Vital Signs:   Vitals:    05/23/22 1056   BP: 172/92   BP Location: Left arm   Patient Position: Sitting   Cuff Size: Large Adult   Pulse: 67   Resp: 15   Temp: 97.1 °F (36.2 °C)   SpO2: 98%   Weight: 95.9 kg (211 lb 8 oz)   Height: 185.4 cm (73\")   PainSc: 0-No pain     Body mass index is 27.9 kg/m².     Physical Exam  Vitals and nursing note reviewed.   Constitutional:       Appearance: Normal appearance. He is normal weight.   HENT:      Head: Normocephalic and atraumatic.      Right Ear: Tympanic membrane, " ear canal and external ear normal.      Left Ear: Tympanic membrane, ear canal and external ear normal.      Nose: Nose normal.      Mouth/Throat:      Mouth: Mucous membranes are dry.      Pharynx: Oropharynx is clear.   Eyes:      Extraocular Movements: Extraocular movements intact.      Conjunctiva/sclera: Conjunctivae normal.      Pupils: Pupils are equal, round, and reactive to light.   Cardiovascular:      Rate and Rhythm: Normal rate and regular rhythm.      Pulses: Normal pulses.      Heart sounds: Normal heart sounds.   Pulmonary:      Effort: Pulmonary effort is normal.      Breath sounds: Normal breath sounds.   Musculoskeletal:      Cervical back: Normal range of motion and neck supple.   Feet:      Comments:      Skin:     Findings: Erythema present.   Neurological:      Mental Status: He is alert.         Procedures      Assessment/Plan:   Diagnoses and all orders for this visit:    1. Acquired hypothyroidism (Primary)  Assessment & Plan:  TSH is 3.52.  Recheck in 6 months.    Orders:  -     traMADol (ULTRAM) 50 MG tablet; Take 1 tablet by mouth Every 12 (Twelve) Hours As Needed for Moderate Pain .  Dispense: 60 tablet; Refill: 2  -     zolpidem (AMBIEN) 10 MG tablet; Take 1 tablet by mouth every night at bedtime.  Dispense: 30 tablet; Refill: 2    2. Coronary artery disease involving native heart without angina pectoris, unspecified vessel or lesion type  Assessment & Plan:  Aggressive  risk factor modification.    Orders:  -     traMADol (ULTRAM) 50 MG tablet; Take 1 tablet by mouth Every 12 (Twelve) Hours As Needed for Moderate Pain .  Dispense: 60 tablet; Refill: 2  -     zolpidem (AMBIEN) 10 MG tablet; Take 1 tablet by mouth every night at bedtime.  Dispense: 30 tablet; Refill: 2    3. Hyperlipidemia, mixed  -     traMADol (ULTRAM) 50 MG tablet; Take 1 tablet by mouth Every 12 (Twelve) Hours As Needed for Moderate Pain .  Dispense: 60 tablet; Refill: 2  -     zolpidem (AMBIEN) 10 MG tablet; Take 1  tablet by mouth every night at bedtime.  Dispense: 30 tablet; Refill: 2    4. Chronic kidney disease, stage 3a (HCC)  Assessment & Plan:  GFR is 58.Patient is instructed to not take any NSAIDs.  Medicines as directed.  Stay well-hydrated.      Orders:  -     traMADol (ULTRAM) 50 MG tablet; Take 1 tablet by mouth Every 12 (Twelve) Hours As Needed for Moderate Pain .  Dispense: 60 tablet; Refill: 2  -     zolpidem (AMBIEN) 10 MG tablet; Take 1 tablet by mouth every night at bedtime.  Dispense: 30 tablet; Refill: 2    5. Primary insomnia  Assessment & Plan:  Refill Ambien.    Orders:  -     traMADol (ULTRAM) 50 MG tablet; Take 1 tablet by mouth Every 12 (Twelve) Hours As Needed for Moderate Pain .  Dispense: 60 tablet; Refill: 2  -     zolpidem (AMBIEN) 10 MG tablet; Take 1 tablet by mouth every night at bedtime.  Dispense: 30 tablet; Refill: 2    6. Hyperglycemia  -     traMADol (ULTRAM) 50 MG tablet; Take 1 tablet by mouth Every 12 (Twelve) Hours As Needed for Moderate Pain .  Dispense: 60 tablet; Refill: 2  -     zolpidem (AMBIEN) 10 MG tablet; Take 1 tablet by mouth every night at bedtime.  Dispense: 30 tablet; Refill: 2    7. Cellulitis of chest wall  Assessment & Plan:  She was seen by urgent treatment center.  Was started on antibiotic.  We will recheck in 1 week to make sure to improved.    Orders:  -     traMADol (ULTRAM) 50 MG tablet; Take 1 tablet by mouth Every 12 (Twelve) Hours As Needed for Moderate Pain .  Dispense: 60 tablet; Refill: 2  -     zolpidem (AMBIEN) 10 MG tablet; Take 1 tablet by mouth every night at bedtime.  Dispense: 30 tablet; Refill: 2    8. Chronic midline low back pain without sciatica  Assessment & Plan:  Fill tramadol.    Orders:  -     traMADol (ULTRAM) 50 MG tablet; Take 1 tablet by mouth Every 12 (Twelve) Hours As Needed for Moderate Pain .  Dispense: 60 tablet; Refill: 2  -     zolpidem (AMBIEN) 10 MG tablet; Take 1 tablet by mouth every night at bedtime.  Dispense: 30 tablet;  Refill: 2    Other orders  -     losartan (COZAAR) 100 MG tablet; Take 1 tablet by mouth Daily.  Dispense: 90 tablet; Refill: 3           Follow Up:   No follow-ups on file.    Mina Story MD  [facility]

## 2022-05-24 ENCOUNTER — TELEPHONE (OUTPATIENT)
Dept: FAMILY MEDICINE CLINIC | Facility: CLINIC | Age: 72
End: 2022-05-24

## 2022-05-24 NOTE — TELEPHONE ENCOUNTER
[facility]:    LUBIPROSTONE 24MCG CAPSULES    KEY:  [id number]    [facility]  [facility]  PH:  [phone]  FAX:  [phone]    ALTERNATIVES THAT DO NOT REQUIRE A PA:    LACTULOSE  LINZESS  MOVANTIK

## 2022-05-25 DIAGNOSIS — K59.00 CONSTIPATION, UNSPECIFIED CONSTIPATION TYPE: ICD-10-CM

## 2022-05-25 RX ORDER — LUBIPROSTONE 24 UG/1
24 CAPSULE ORAL 2 TIMES DAILY WITH MEALS
Qty: 180 CAPSULE | Refills: 0 | Status: SHIPPED | OUTPATIENT
Start: 2022-05-25 | End: 2022-10-21

## 2022-05-25 NOTE — TELEPHONE ENCOUNTER
Patient calling again today about his Amitiza.  He says it requires a PA and is concerned if he has to go without it.

## 2022-05-26 DIAGNOSIS — G89.29 CHRONIC MIDLINE LOW BACK PAIN WITHOUT SCIATICA: ICD-10-CM

## 2022-05-26 DIAGNOSIS — E78.2 HYPERLIPIDEMIA, MIXED: ICD-10-CM

## 2022-05-26 DIAGNOSIS — R73.9 HYPERGLYCEMIA: ICD-10-CM

## 2022-05-26 DIAGNOSIS — N18.31 CHRONIC KIDNEY DISEASE, STAGE 3A: ICD-10-CM

## 2022-05-26 DIAGNOSIS — F51.01 PRIMARY INSOMNIA: ICD-10-CM

## 2022-05-26 DIAGNOSIS — E03.9 ACQUIRED HYPOTHYROIDISM: ICD-10-CM

## 2022-05-26 DIAGNOSIS — I25.10 CORONARY ARTERY DISEASE INVOLVING NATIVE HEART WITHOUT ANGINA PECTORIS, UNSPECIFIED VESSEL OR LESION TYPE: ICD-10-CM

## 2022-05-26 DIAGNOSIS — M54.50 CHRONIC MIDLINE LOW BACK PAIN WITHOUT SCIATICA: ICD-10-CM

## 2022-05-26 DIAGNOSIS — L03.313 CELLULITIS OF CHEST WALL: ICD-10-CM

## 2022-05-31 RX ORDER — TRAMADOL HYDROCHLORIDE 50 MG/1
50 TABLET ORAL EVERY 12 HOURS PRN
Qty: 60 TABLET | Refills: 2 | Status: SHIPPED | OUTPATIENT
Start: 2022-05-31 | End: 2022-08-23 | Stop reason: SDUPTHER

## 2022-05-31 NOTE — TELEPHONE ENCOUNTER
Rx Refill Note    Requested Prescriptions     Pending Prescriptions Disp Refills   • traMADol (ULTRAM) 50 MG tablet 60 tablet 2     Sig: Take 1 tablet by mouth Every 12 (Twelve) Hours As Needed for Moderate Pain .        Last office visit with prescribing clinician: 5/23/2022      Next office visit with prescribing clinician: 6/16/2022   Last labs:   Last refill: 5/23/2022    Pharmacy said pharmacy confirmed but pt called to say he did not get.  [facility] [place]

## 2022-06-24 RX ORDER — LEVOTHYROXINE SODIUM 0.05 MG/1
TABLET ORAL
Qty: 90 TABLET | Refills: 0 | Status: SHIPPED | OUTPATIENT
Start: 2022-06-24 | End: 2022-09-19

## 2022-07-25 DIAGNOSIS — E03.9 HYPOTHYROIDISM, UNSPECIFIED TYPE: Primary | ICD-10-CM

## 2022-07-25 DIAGNOSIS — K21.9 GASTROESOPHAGEAL REFLUX DISEASE WITHOUT ESOPHAGITIS: ICD-10-CM

## 2022-07-25 DIAGNOSIS — I73.9 PAD (PERIPHERAL ARTERY DISEASE): ICD-10-CM

## 2022-07-26 RX ORDER — LIOTHYRONINE SODIUM 5 UG/1
TABLET ORAL
Qty: 180 TABLET | Refills: 0 | Status: SHIPPED | OUTPATIENT
Start: 2022-07-26 | End: 2022-10-24

## 2022-07-26 RX ORDER — CLOPIDOGREL BISULFATE 75 MG/1
TABLET ORAL
Qty: 90 TABLET | Refills: 0 | Status: SHIPPED | OUTPATIENT
Start: 2022-07-26 | End: 2022-10-17

## 2022-07-26 RX ORDER — OMEPRAZOLE 40 MG/1
CAPSULE, DELAYED RELEASE ORAL
Qty: 180 CAPSULE | Refills: 0 | Status: SHIPPED | OUTPATIENT
Start: 2022-07-26 | End: 2022-10-17

## 2022-08-23 ENCOUNTER — OFFICE VISIT (OUTPATIENT)
Dept: FAMILY MEDICINE CLINIC | Facility: CLINIC | Age: 72
End: 2022-08-23

## 2022-08-23 VITALS
DIASTOLIC BLOOD PRESSURE: 90 MMHG | HEIGHT: 72 IN | SYSTOLIC BLOOD PRESSURE: 170 MMHG | WEIGHT: 205.7 LBS | BODY MASS INDEX: 27.86 KG/M2 | OXYGEN SATURATION: 96 % | HEART RATE: 60 BPM

## 2022-08-23 VITALS
HEART RATE: 60 BPM | DIASTOLIC BLOOD PRESSURE: 90 MMHG | HEIGHT: 72 IN | WEIGHT: 205.7 LBS | OXYGEN SATURATION: 96 % | SYSTOLIC BLOOD PRESSURE: 170 MMHG | BODY MASS INDEX: 27.86 KG/M2

## 2022-08-23 DIAGNOSIS — M54.50 CHRONIC MIDLINE LOW BACK PAIN WITHOUT SCIATICA: ICD-10-CM

## 2022-08-23 DIAGNOSIS — G47.33 OBSTRUCTIVE SLEEP APNEA SYNDROME: ICD-10-CM

## 2022-08-23 DIAGNOSIS — F51.01 PRIMARY INSOMNIA: Primary | ICD-10-CM

## 2022-08-23 DIAGNOSIS — R73.9 HYPERGLYCEMIA: ICD-10-CM

## 2022-08-23 DIAGNOSIS — Z00.00 MEDICARE ANNUAL WELLNESS VISIT, SUBSEQUENT: Primary | ICD-10-CM

## 2022-08-23 DIAGNOSIS — E78.2 HYPERLIPIDEMIA, MIXED: ICD-10-CM

## 2022-08-23 DIAGNOSIS — I10 ESSENTIAL HYPERTENSION: ICD-10-CM

## 2022-08-23 DIAGNOSIS — G89.29 CHRONIC MIDLINE LOW BACK PAIN WITHOUT SCIATICA: ICD-10-CM

## 2022-08-23 DIAGNOSIS — N18.31 CHRONIC KIDNEY DISEASE, STAGE 3A: ICD-10-CM

## 2022-08-23 DIAGNOSIS — M15.9 PRIMARY OSTEOARTHRITIS INVOLVING MULTIPLE JOINTS: ICD-10-CM

## 2022-08-23 DIAGNOSIS — Z12.11 COLON CANCER SCREENING: ICD-10-CM

## 2022-08-23 DIAGNOSIS — I48.0 PAROXYSMAL ATRIAL FIBRILLATION: ICD-10-CM

## 2022-08-23 DIAGNOSIS — I73.9 PERIPHERAL ARTERIAL DISEASE: ICD-10-CM

## 2022-08-23 DIAGNOSIS — I25.5 ISCHEMIC MYOCARDIAL DYSFUNCTION: ICD-10-CM

## 2022-08-23 DIAGNOSIS — C61 HORMONE SENSITIVE PROSTATE CANCER: ICD-10-CM

## 2022-08-23 DIAGNOSIS — E03.9 ACQUIRED HYPOTHYROIDISM: ICD-10-CM

## 2022-08-23 DIAGNOSIS — I25.10 CORONARY ARTERY DISEASE INVOLVING NATIVE CORONARY ARTERY OF NATIVE HEART WITHOUT ANGINA PECTORIS: ICD-10-CM

## 2022-08-23 DIAGNOSIS — I25.10 CORONARY ARTERY DISEASE INVOLVING NATIVE HEART WITHOUT ANGINA PECTORIS, UNSPECIFIED VESSEL OR LESION TYPE: ICD-10-CM

## 2022-08-23 DIAGNOSIS — Z79.899 HIGH RISK MEDICATION USE: ICD-10-CM

## 2022-08-23 DIAGNOSIS — Z19.1 HORMONE SENSITIVE PROSTATE CANCER: ICD-10-CM

## 2022-08-23 DIAGNOSIS — Z71.89 ADVANCED DIRECTIVES, COUNSELING/DISCUSSION: ICD-10-CM

## 2022-08-23 DIAGNOSIS — R35.1 BPH ASSOCIATED WITH NOCTURIA: ICD-10-CM

## 2022-08-23 DIAGNOSIS — L03.313 CELLULITIS OF CHEST WALL: ICD-10-CM

## 2022-08-23 DIAGNOSIS — K59.04 CHRONIC IDIOPATHIC CONSTIPATION: ICD-10-CM

## 2022-08-23 DIAGNOSIS — I25.119 CORONARY ARTERY DISEASE INVOLVING NATIVE CORONARY ARTERY OF NATIVE HEART WITH ANGINA PECTORIS: ICD-10-CM

## 2022-08-23 DIAGNOSIS — Z79.01 LONG TERM (CURRENT) USE OF ANTICOAGULANTS: ICD-10-CM

## 2022-08-23 DIAGNOSIS — N40.1 BPH ASSOCIATED WITH NOCTURIA: ICD-10-CM

## 2022-08-23 PROBLEM — F33.42 RECURRENT MAJOR DEPRESSION IN FULL REMISSION: Status: ACTIVE | Noted: 2022-08-23

## 2022-08-23 PROBLEM — M15.0 PRIMARY OSTEOARTHRITIS INVOLVING MULTIPLE JOINTS: Status: ACTIVE | Noted: 2022-08-23

## 2022-08-23 PROBLEM — J30.2 SEASONAL ALLERGIES: Status: ACTIVE | Noted: 2022-08-23

## 2022-08-23 PROBLEM — I45.10 RBBB: Status: ACTIVE | Noted: 2022-08-23

## 2022-08-23 PROCEDURE — 1170F FXNL STATUS ASSESSED: CPT | Performed by: FAMILY MEDICINE

## 2022-08-23 PROCEDURE — 1159F MED LIST DOCD IN RCRD: CPT | Performed by: FAMILY MEDICINE

## 2022-08-23 PROCEDURE — 99214 OFFICE O/P EST MOD 30 MIN: CPT | Performed by: FAMILY MEDICINE

## 2022-08-23 PROCEDURE — G0439 PPPS, SUBSEQ VISIT: HCPCS | Performed by: FAMILY MEDICINE

## 2022-08-23 PROCEDURE — 96160 PT-FOCUSED HLTH RISK ASSMT: CPT | Performed by: FAMILY MEDICINE

## 2022-08-23 RX ORDER — KETOCONAZOLE 20 MG/ML
SHAMPOO TOPICAL
COMMUNITY
Start: 2022-08-09 | End: 2023-02-23 | Stop reason: SDUPTHER

## 2022-08-23 RX ORDER — TACROLIMUS 1 MG/G
OINTMENT TOPICAL
COMMUNITY
Start: 2022-08-10

## 2022-08-23 RX ORDER — ZOLPIDEM TARTRATE 10 MG/1
10 TABLET ORAL
Qty: 30 TABLET | Refills: 2 | Status: SHIPPED | OUTPATIENT
Start: 2022-08-23 | End: 2022-11-23 | Stop reason: SDUPTHER

## 2022-08-23 RX ORDER — NYSTATIN 100000 [USP'U]/G
POWDER TOPICAL
COMMUNITY
Start: 2022-06-24 | End: 2022-08-23

## 2022-08-23 RX ORDER — CLOTRIMAZOLE AND BETAMETHASONE DIPROPIONATE 10; .64 MG/G; MG/G
CREAM TOPICAL
COMMUNITY
Start: 2022-06-09 | End: 2022-08-23

## 2022-08-23 RX ORDER — TRAMADOL HYDROCHLORIDE 50 MG/1
50 TABLET ORAL EVERY 12 HOURS PRN
Qty: 60 TABLET | Refills: 2 | Status: SHIPPED | OUTPATIENT
Start: 2022-08-23 | End: 2022-11-23 | Stop reason: SDUPTHER

## 2022-08-23 RX ORDER — FLUCONAZOLE 150 MG/1
150 TABLET ORAL DAILY
COMMUNITY
Start: 2022-06-23 | End: 2022-08-23

## 2022-08-23 NOTE — PROGRESS NOTES
Patient Name: Kavon Waters Jr.  : 1950   MRN: [id number]     Chief Complaint:    Chief Complaint   Patient presents with   • Hypertension   • Med Refill       History of Present Illness: Kavon Waters Jr. is a 71 y.o. male who is here today for follow up on sleep med and pain med.   HPI        Review of Systems:   Review of Systems   Constitutional: Negative.    HENT: Negative.    Eyes: Negative.    Respiratory: Negative.    Cardiovascular: Negative.    Gastrointestinal: Negative.    Neurological: Negative.         Past Medical History:   Past Medical History:   Diagnosis Date   • Acquired hypothyroidism    • Allergies    • Arrhythmia    • Arthritis    • Atrial fibrillation (HCC)    • AV block, 1st degree    • Borderline diabetes    • BPH associated with nocturia    • Chronic idiopathic constipation    • CKD (chronic kidney disease), stage III (HCC)    • Constipation    • Coronary artery disease involving native coronary artery of native heart without angina pectoris    • Depression    • Disease of thyroid gland    • Eczema    • Essential hypertension    • Full dentures    • GERD (gastroesophageal reflux disease)    • H/O heart artery stent     X 7   • High risk medication use    • Ischemic cardiomyopathy    • Long term (current) use of anticoagulants    • Low sodium levels    • Lower back pain    • Mixed hyperlipidemia    • PAUL (obstructive sleep apnea)     NO CPAP USE   • Paroxysmal A-fib (HCC)    • Primary osteoarthritis involving multiple joints    • Prostate cancer (HCC)     HAD 43 RADIATION TREATMENTS.   • Prostatic hypertrophy     Benign   • RBBB    • Recurrent major depressive disorder, in full remission (HCC)    • Seasonal allergies    • Skin cancer    • Stroke (HCC)     AFFECTED RIGHT ARM/LEG.  PT DENIES RESIDUAL DEFICITS.   • Thyroid disease    • Wears glasses        Past Surgical History:   Past Surgical History:   Procedure Laterality Date   • CARDIAC CATHETERIZATION     • CARDIAC  CATHETERIZATION N/A 2018    Procedure: Left Heart Cath;  Surgeon: Moshe Ahn MD;  Location:  [facility];  Service: Cardiovascular   • CHOLECYSTECTOMY     • CIRCUMCISION     • COLONOSCOPY  2013   • CORONARY ARTERY BYPASS GRAFT N/A 2018    Procedure: MEDIASTERNOTOMY CORONARY ARTERY BYPASS GRAFT X2 UTILIZING LEFT INTERNAL MAMMARY ARTERY, EVH LEFT GREATER SAPHENOUS VEIN;  Surgeon: Rakesh Mittal MD;  Location:  [facility];  Service: Cardiothoracic   • CORONARY STENT PLACEMENT     • SKIN CANCER EXCISION         Family History:   Family History   Problem Relation Age of Onset   • Kidney failure Mother         COMPLICATIONS OF DIALYSIS       Social History:   Social History     Socioeconomic History   • Marital status:      Spouse name: Mara   • Number of children: 2   • Highest education level: 9th grade   Tobacco Use   • Smoking status: Former Smoker     Packs/day: 1.00     Years: 23.00     Pack years: 23.00     Types: Cigarettes     Start date:      Quit date: 1988     Years since quittin.4   • Smokeless tobacco: Never Used   • Tobacco comment: QUIT 30 YEARS AGO   Vaping Use   • Vaping Use: Never used   Substance and Sexual Activity   • Alcohol use: No   • Drug use: No   • Sexual activity: Defer       Medications:     Current Outpatient Medications:   •  aspirin 81 MG chewable tablet, Chew 81 mg Daily., Disp: , Rfl:   •  clopidogrel (PLAVIX) 75 MG tablet, TAKE 1 TABLET BY MOUTH EVERY DAY, Disp: 90 tablet, Rfl: 0  •  cyclobenzaprine (FLEXERIL) 10 MG tablet, Take 1 tablet by mouth 2 (Two) Times a Day As Needed for Muscle Spasms., Disp: 40 tablet, Rfl: 0  •  fluticasone (FLONASE) 50 MCG/ACT nasal spray, 1 spray into each nostril Daily., Disp: , Rfl:   •  hydrocortisone 2.5 % cream, APPLY TO THE AFFECTED AREA TWICE DAILY AS NEEDED, Disp: , Rfl:   •  ketoconazole (NIZORAL) 2 % cream, ketoconazole 2 % topical cream  APPLY TO THE AFFECTED AREA(S) BY  "TOPICAL ROUTE ONCE DAILY, Disp: , Rfl:   •  ketoconazole (NIZORAL) 2 % shampoo, USE TWICE WEEKLY AS DIRECTED, Disp: , Rfl:   •  levothyroxine (SYNTHROID, LEVOTHROID) 50 MCG tablet, TAKE 1 TABLET BY MOUTH EVERY DAY, Disp: 90 tablet, Rfl: 0  •  liothyronine (CYTOMEL) 5 MCG tablet, TAKE 1 TABLET BY MOUTH TWICE DAILY, Disp: 180 tablet, Rfl: 0  •  losartan (COZAAR) 100 MG tablet, Take 1 tablet by mouth Daily., Disp: 90 tablet, Rfl: 3  •  lubiprostone (AMITIZA) 24 MCG capsule, Take 1 capsule by mouth 2 (Two) Times a Day With Meals. Approved case # [id number] from 1/01/2022 to 12/31/2022 cover my meds, Disp: 180 capsule, Rfl: 0  •  omeprazole (priLOSEC) 40 MG capsule, TAKE 1 CAPSULE BY MOUTH TWICE DAILY BEFORE A MEAL, Disp: 180 capsule, Rfl: 0  •  REPATHA SURECLICK 140 MG/ML solution auto-injector, , Disp: , Rfl: 1  •  traMADol (ULTRAM) 50 MG tablet, Take 1 tablet by mouth Every 12 (Twelve) Hours As Needed for Moderate Pain ., Disp: 60 tablet, Rfl: 2  •  zolpidem (AMBIEN) 10 MG tablet, Take 1 tablet by mouth every night at bedtime., Disp: 30 tablet, Rfl: 2  •  tacrolimus (PROTOPIC) 0.1 % ointment, APPLY TOPICALLY TO THE AFFECTED AREA TWICE DAILY AS NEEDED, Disp: , Rfl:     Allergies:   Allergies   Allergen Reactions   • Celecoxib Unknown - High Severity   • Ibuprofen Unknown - High Severity   • Terfenadine Unknown - High Severity         Physical Exam:  Vital Signs:   Vitals:    08/23/22 1250   BP: 170/90   BP Location: Right arm   Patient Position: Sitting   Cuff Size: Adult   Pulse: 60   SpO2: 96%   Weight: 93.3 kg (205 lb 11.2 oz)   Height: 182.9 cm (72\")   PainSc: 0-No pain     Body mass index is 27.9 kg/m².     Physical Exam  Vitals and nursing note reviewed.   Constitutional:       Appearance: Normal appearance. He is normal weight.   HENT:      Head: Normocephalic and atraumatic.      Right Ear: Tympanic membrane, ear canal and external ear normal.      Left Ear: Tympanic membrane, ear canal and external ear normal. "      Nose: Nose normal.      Mouth/Throat:      Mouth: Mucous membranes are dry.      Pharynx: Oropharynx is clear.   Eyes:      Extraocular Movements: Extraocular movements intact.      Conjunctiva/sclera: Conjunctivae normal.      Pupils: Pupils are equal, round, and reactive to light.   Cardiovascular:      Rate and Rhythm: Normal rate and regular rhythm.      Pulses: Normal pulses.      Heart sounds: Normal heart sounds.   Pulmonary:      Effort: Pulmonary effort is normal.      Breath sounds: Normal breath sounds.   Musculoskeletal:      Cervical back: Normal range of motion and neck supple.   Feet:      Comments:      Neurological:      Mental Status: He is alert.         Procedures      Assessment/Plan:   Diagnoses and all orders for this visit:    1. Primary insomnia (Primary)  Assessment & Plan:  Refill Ambien.  House bill 1.    Orders:  -     Hemoglobin A1c; Future  -     Lipid Panel; Future  -     Comprehensive Metabolic Panel; Future  -     TSH Rfx On Abnormal To Free T4; Future  -     CBC & Differential; Future  -     traMADol (ULTRAM) 50 MG tablet; Take 1 tablet by mouth Every 12 (Twelve) Hours As Needed for Moderate Pain .  Dispense: 60 tablet; Refill: 2  -     zolpidem (AMBIEN) 10 MG tablet; Take 1 tablet by mouth every night at bedtime.  Dispense: 30 tablet; Refill: 2    2. Chronic midline low back pain without sciatica  Assessment & Plan:  Refill tramadol.    Orders:  -     Hemoglobin A1c; Future  -     Lipid Panel; Future  -     Comprehensive Metabolic Panel; Future  -     TSH Rfx On Abnormal To Free T4; Future  -     CBC & Differential; Future  -     traMADol (ULTRAM) 50 MG tablet; Take 1 tablet by mouth Every 12 (Twelve) Hours As Needed for Moderate Pain .  Dispense: 60 tablet; Refill: 2  -     zolpidem (AMBIEN) 10 MG tablet; Take 1 tablet by mouth every night at bedtime.  Dispense: 30 tablet; Refill: 2    3. Coronary artery disease involving native coronary artery of native heart with angina  pectoris (HCC)  Assessment & Plan:  Aggressive risk factor modification.    Orders:  -     Hemoglobin A1c; Future  -     Lipid Panel; Future  -     Comprehensive Metabolic Panel; Future  -     TSH Rfx On Abnormal To Free T4; Future  -     CBC & Differential; Future    4. Acquired hypothyroidism  Assessment & Plan:  Blood work in 3 months    Orders:  -     Hemoglobin A1c; Future  -     Lipid Panel; Future  -     Comprehensive Metabolic Panel; Future  -     TSH Rfx On Abnormal To Free T4; Future  -     CBC & Differential; Future  -     traMADol (ULTRAM) 50 MG tablet; Take 1 tablet by mouth Every 12 (Twelve) Hours As Needed for Moderate Pain .  Dispense: 60 tablet; Refill: 2  -     zolpidem (AMBIEN) 10 MG tablet; Take 1 tablet by mouth every night at bedtime.  Dispense: 30 tablet; Refill: 2    5. Hyperglycemia  Assessment & Plan:  Blood work in 3 months.    Orders:  -     Hemoglobin A1c; Future  -     Lipid Panel; Future  -     Comprehensive Metabolic Panel; Future  -     TSH Rfx On Abnormal To Free T4; Future  -     CBC & Differential; Future  -     traMADol (ULTRAM) 50 MG tablet; Take 1 tablet by mouth Every 12 (Twelve) Hours As Needed for Moderate Pain .  Dispense: 60 tablet; Refill: 2  -     zolpidem (AMBIEN) 10 MG tablet; Take 1 tablet by mouth every night at bedtime.  Dispense: 30 tablet; Refill: 2    6. Coronary artery disease involving native heart without angina pectoris, unspecified vessel or lesion type  -     traMADol (ULTRAM) 50 MG tablet; Take 1 tablet by mouth Every 12 (Twelve) Hours As Needed for Moderate Pain .  Dispense: 60 tablet; Refill: 2  -     zolpidem (AMBIEN) 10 MG tablet; Take 1 tablet by mouth every night at bedtime.  Dispense: 30 tablet; Refill: 2    7. Hyperlipidemia, mixed  -     traMADol (ULTRAM) 50 MG tablet; Take 1 tablet by mouth Every 12 (Twelve) Hours As Needed for Moderate Pain .  Dispense: 60 tablet; Refill: 2  -     zolpidem (AMBIEN) 10 MG tablet; Take 1 tablet by mouth every  night at bedtime.  Dispense: 30 tablet; Refill: 2    8. Chronic kidney disease, stage 3a (HCC)  -     traMADol (ULTRAM) 50 MG tablet; Take 1 tablet by mouth Every 12 (Twelve) Hours As Needed for Moderate Pain .  Dispense: 60 tablet; Refill: 2  -     zolpidem (AMBIEN) 10 MG tablet; Take 1 tablet by mouth every night at bedtime.  Dispense: 30 tablet; Refill: 2    9. Cellulitis of chest wall  -     traMADol (ULTRAM) 50 MG tablet; Take 1 tablet by mouth Every 12 (Twelve) Hours As Needed for Moderate Pain .  Dispense: 60 tablet; Refill: 2  -     zolpidem (AMBIEN) 10 MG tablet; Take 1 tablet by mouth every night at bedtime.  Dispense: 30 tablet; Refill: 2           Follow Up:   Return in about 3 months (around 11/23/2022) for Bloodwork 1 week prior to next appointment.    Mina Story MD  [facility]

## 2022-08-23 NOTE — PROGRESS NOTES
QUICK REFERENCE INFORMATION:  The ABCs of the Annual Wellness Visit    Subsequent Medicare Wellness Visit      HEALTH RISK ASSESSMENT    1950    Recent Hospitalizations:  No hospitalization(s) within the last year..    Current Medical Providers:  Patient Care Team:  Mina Story MD as PCP - General  Jg Garcia Jr., MD as Consulting Physician (Urology)  Rosendo Jo MD (Dermatology)  Alida Mcknight MD as Consulting Physician (Hematology and Oncology)    Smoking Status:  Social History     Tobacco Use   Smoking Status Former Smoker   • Packs/day: 1.00   • Years: 23.00   • Pack years: 23.00   • Types: Cigarettes   • Start date:    • Quit date: 1988   • Years since quittin.4   Smokeless Tobacco Never Used   Tobacco Comment    QUIT 30 YEARS AGO       Alcohol Consumption:  Social History     Substance and Sexual Activity   Alcohol Use No       Depression Screen:   PHQ-2/PHQ-9 Depression Screening 2022   Little Interest or Pleasure in Doing Things 0-->not at all   Feeling Down, Depressed or Hopeless 0-->not at all   Trouble Falling or Staying Asleep, or Sleeping Too Much 0-->not at all   Feeling Tired or Having Little Energy 0-->not at all   Poor Appetite or Overeating 0-->not at all   Feeling Bad about Yourself - or that You are a Failure or Have Let Yourself or Your Family Down 0-->not at all   Trouble Concentrating on Things, Such as Reading the Newspaper or Watching Television 0-->not at all   Moving or Speaking So Slowly that Other People Could Have Noticed? Or the Opposite - Being So Fidgety 0-->not at all   Thoughts that You Would be Better Off Dead or of Hurting Yourself in Some Way 0-->not at all   PHQ-9: Brief Depression Severity Measure Score 0   If You Checked Off Any Problems, How Difficult Have These Problems Made It For You to Do Your Work, Take Care of Things at Home, or Get Along with Other People? not difficult at all       Health Habits and  Functional and Cognitive Screening:  Functional & Cognitive Status 8/23/2022   Do you have difficulty preparing food and eating? No   Do you have difficulty bathing yourself, getting dressed or grooming yourself? No   Do you have difficulty using the toilet? No   Do you have difficulty moving around from place to place? No   Current Diet Other        Current Diet Comment limited sugar   Dental Exam Not up to date   Eye Exam Not up to date   Exercise (times per week) 2 times per week   Current Exercises Include Other        Exercise Comment squats   Do you need help using the phone?  No   Are you deaf or do you have serious difficulty hearing?  No   Do you need help with transportation? No   Do you need help shopping? No   Do you need help preparing meals?  No   Do you need help with housework?  No   Do you need help with laundry? No   Do you need help taking your medications? No   Do you need help managing money? No   Do you ever drive or ride in a car without wearing a seat belt? No   Have you felt unusual stress, anger or loneliness in the last month? No   Who do you live with? Spouse   If you need help, do you have trouble finding someone available to you? No   Do you have difficulty concentrating, remembering or making decisions? No       Fall Risk Screen:  MIKIADI Fall Risk Assessment was completed, and patient is at LOW risk for falls.Assessment completed on:8/23/2022    ACE III MINI        Does the patient have evidence of cognitive impairment? No    Opioid medication/s are on active medication list.  and I have evaluated his active treatment plan and pain score trends (see table).  There were no vitals filed for this visit.  I have reviewed the chart for potential of high risk medication and harmful drug interactions in the elderly.            Aspirin use counseling: Taking ASA appropriately as indicated    Recent Lab Results:  CMP:  Lab Results   Component Value Date    BUN 16 05/16/2022    CREATININE 1.31  (H) 05/16/2022    EGFRIFNONA 55 (L) 04/09/2018    BCR 12 05/16/2022     05/16/2022    K 4.6 05/16/2022    CO2 22 05/16/2022    CALCIUM 9.2 05/16/2022    PROTENTOTREF 7.0 05/16/2022    ALBUMIN 4.5 05/16/2022    LABGLOBREF 2.5 05/16/2022    LABIL2 1.8 05/16/2022    BILITOT 0.4 05/16/2022    ALKPHOS 67 05/16/2022    AST 20 05/16/2022    ALT 16 05/16/2022     HbA1c:  Lab Results   Component Value Date    HGBA1C 5.8 (H) 05/16/2022    HGBA1C 5.80 (H) 04/02/2018     Microalbumin:  No results found for: MICROALBUR, POCMALB, POCCREAT  Lipid Panel  Lab Results   Component Value Date    TRIG 135 05/16/2022    HDL 37 (L) 05/16/2022     (H) 05/16/2022    AST 20 05/16/2022    ALT 16 05/16/2022       Visual Acuity:  No exam data present    Age-appropriate Screening Schedule:  Refer to the list below for future screening recommendations based on patient's age, sex and/or medical conditions. Orders for these recommended tests are listed in the plan section. The patient has been provided with a written plan.    Health Maintenance   Topic Date Due   • ZOSTER VACCINE (1 of 2) 08/23/2022 (Originally 11/11/1999)   • TDAP/TD VACCINES (2 - Td or Tdap) 05/23/2023 (Originally 8/12/2021)   • INFLUENZA VACCINE  10/01/2022   • LIPID PANEL  05/16/2023        Social Determinants of Health     Tobacco Use: Medium Risk   • Smoking Tobacco Use: Former Smoker   • Smokeless Tobacco Use: Never Used   Alcohol Use: Not At Risk   • Frequency of Alcohol Consumption: Never   • Average Number of Drinks: Patient does not drink   • Frequency of Binge Drinking: Never   Financial Resource Strain: Low Risk    • Difficulty of Paying Living Expenses: Not hard at all   Food Insecurity: No Food Insecurity   • Worried About Running Out of Food in the Last Year: Never true   • Ran Out of Food in the Last Year: Never true   Transportation Needs: No Transportation Needs   • Lack of Transportation (Medical): No   • Lack of Transportation (Non-Medical): No    Physical Activity: Insufficiently Active   • Days of Exercise per Week: 2 days   • Minutes of Exercise per Session: 10 min   Stress: No Stress Concern Present   • Feeling of Stress : Not at all   Social Connections: Moderately Integrated   • Frequency of Communication with Friends and Family: More than three times a week   • Frequency of Social Gatherings with Friends and Family: More than three times a week   • Attends Yazdanism Services: 1 to 4 times per year   • Active Member of Clubs or Organizations: No   • Attends Club or Organization Meetings: Never   • Marital Status:    Intimate Partner Violence: Not At Risk   • Fear of Current or Ex-Partner: No   • Emotionally Abused: No   • Physically Abused: No   • Sexually Abused: No   Depression: Not at risk   • PHQ-2 Score: 0   Housing Stability: Low Risk    • Unable to Pay for Housing in the Last Year: No   • Number of Places Lived in the Last Year: 1   • Unstable Housing in the Last Year: No         Subjective     Kavon Waters Jr. is a 71 y.o. male who presents for a Subsequent Wellness Visit.    The following portions of the patient's history were reviewed and updated as appropriate: allergies, current medications, past family history, past medical history, past social history, past surgical history and problem list.    Outpatient Medications Prior to Visit   Medication Sig Dispense Refill   • aspirin 81 MG chewable tablet Chew 81 mg Daily.     • clopidogrel (PLAVIX) 75 MG tablet TAKE 1 TABLET BY MOUTH EVERY DAY 90 tablet 0   • cyclobenzaprine (FLEXERIL) 10 MG tablet Take 1 tablet by mouth 2 (Two) Times a Day As Needed for Muscle Spasms. 40 tablet 0   • fluticasone (FLONASE) 50 MCG/ACT nasal spray 1 spray into each nostril Daily.     • hydrocortisone 2.5 % cream APPLY TO THE AFFECTED AREA TWICE DAILY AS NEEDED     • ketoconazole (NIZORAL) 2 % cream ketoconazole 2 % topical cream   APPLY TO THE AFFECTED AREA(S) BY TOPICAL ROUTE ONCE DAILY     •  levothyroxine (SYNTHROID, LEVOTHROID) 50 MCG tablet TAKE 1 TABLET BY MOUTH EVERY DAY 90 tablet 0   • liothyronine (CYTOMEL) 5 MCG tablet TAKE 1 TABLET BY MOUTH TWICE DAILY 180 tablet 0   • losartan (COZAAR) 100 MG tablet Take 1 tablet by mouth Daily. 90 tablet 3   • lubiprostone (AMITIZA) 24 MCG capsule Take 1 capsule by mouth 2 (Two) Times a Day With Meals. Approved case # [id number] from 1/01/2022 to 12/31/2022 cover my meds 180 capsule 0   • omeprazole (priLOSEC) 40 MG capsule TAKE 1 CAPSULE BY MOUTH TWICE DAILY BEFORE A MEAL 180 capsule 0   • REPATHA SURECLICK 140 MG/ML solution auto-injector   1   • traMADol (ULTRAM) 50 MG tablet Take 1 tablet by mouth Every 12 (Twelve) Hours As Needed for Moderate Pain . 60 tablet 2   • zolpidem (AMBIEN) 10 MG tablet Take 1 tablet by mouth every night at bedtime. 30 tablet 2   • ketoconazole (NIZORAL) 2 % shampoo USE TWICE WEEKLY AS DIRECTED     • tacrolimus (PROTOPIC) 0.1 % ointment APPLY TOPICALLY TO THE AFFECTED AREA TWICE DAILY AS NEEDED       No facility-administered medications prior to visit.       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 No residual and no occlusive carotid artery disease   • Neck pain   • Back pain   • Balanitis   • Chronic kidney disease   • Fatigue   • Hot flash due to medication   • Injection site reaction   • Hormone sensitive prostate cancer (HCC)   • Malignant tumor of prostate (HCC)   • Overweight with body mass index (BMI) 25.0-29.9   • Raised prostate specific antigen  "  • Acquired hypothyroidism   • Chronic kidney disease, stage 3a (HCC)   • Primary insomnia   • Hyperglycemia   • Cellulitis of chest wall   • BPH associated with nocturia   • Chronic idiopathic constipation   • High risk medication use   • Ischemic myocardial dysfunction   • Long term (current) use of anticoagulants   • Obstructive sleep apnea syndrome   • Paroxysmal atrial fibrillation (HCC)   • Primary osteoarthritis involving multiple joints   • RBBB   • Recurrent major depression in full remission (HCC)   • Seasonal allergies       Advance Care Planning:  ACP discussion was held with the patient during this visit. Patient does not have an advance directive, information provided.    Identification of Risk Factors:  Risk factors include: Advance Directive Discussion  Colon Cancer Screening  Immunizations Discussed/Encouraged (specific immunizations; Td, Shingrix and COVID19 )  Inactivity/Sedentary  Obesity/Overweight .    Compared to one year ago, the patient feels his physical health is the same.  Compared to one year ago, the patient feels his mental health is the same.    Objective       Vitals:    08/23/22 1300   BP: 170/90   Patient Position: Sitting   Pulse: 60   SpO2: 96%   Weight: 93.3 kg (205 lb 11.2 oz)   Height: 182.9 cm (72.01\")     BMI Readings from Last 1 Encounters:   08/23/22 27.89 kg/m²   BMI is above normal parameters. Recommendations include: exercise counseling and nutrition counseling      Assessment & Plan   Problem List Items Addressed This Visit    None       Patient Self-Management and Personalized Health Advice  The patient has been provided with information about: diet, exercise and designing advance directives and preventive services including:   · Annual Wellness Visit (AWV).    Outpatient Encounter Medications as of 8/23/2022   Medication Sig Dispense Refill   • aspirin 81 MG chewable tablet Chew 81 mg Daily.     • clopidogrel (PLAVIX) 75 MG tablet TAKE 1 TABLET BY MOUTH EVERY DAY 90 " tablet 0   • cyclobenzaprine (FLEXERIL) 10 MG tablet Take 1 tablet by mouth 2 (Two) Times a Day As Needed for Muscle Spasms. 40 tablet 0   • fluticasone (FLONASE) 50 MCG/ACT nasal spray 1 spray into each nostril Daily.     • hydrocortisone 2.5 % cream APPLY TO THE AFFECTED AREA TWICE DAILY AS NEEDED     • ketoconazole (NIZORAL) 2 % cream ketoconazole 2 % topical cream   APPLY TO THE AFFECTED AREA(S) BY TOPICAL ROUTE ONCE DAILY     • levothyroxine (SYNTHROID, LEVOTHROID) 50 MCG tablet TAKE 1 TABLET BY MOUTH EVERY DAY 90 tablet 0   • liothyronine (CYTOMEL) 5 MCG tablet TAKE 1 TABLET BY MOUTH TWICE DAILY 180 tablet 0   • losartan (COZAAR) 100 MG tablet Take 1 tablet by mouth Daily. 90 tablet 3   • lubiprostone (AMITIZA) 24 MCG capsule Take 1 capsule by mouth 2 (Two) Times a Day With Meals. Approved case # [id number] from 1/01/2022 to 12/31/2022 cover my meds 180 capsule 0   • omeprazole (priLOSEC) 40 MG capsule TAKE 1 CAPSULE BY MOUTH TWICE DAILY BEFORE A MEAL 180 capsule 0   • REPATHA SURECLICK 140 MG/ML solution auto-injector   1   • traMADol (ULTRAM) 50 MG tablet Take 1 tablet by mouth Every 12 (Twelve) Hours As Needed for Moderate Pain . 60 tablet 2   • zolpidem (AMBIEN) 10 MG tablet Take 1 tablet by mouth every night at bedtime. 30 tablet 2   • ketoconazole (NIZORAL) 2 % shampoo USE TWICE WEEKLY AS DIRECTED     • tacrolimus (PROTOPIC) 0.1 % ointment APPLY TOPICALLY TO THE AFFECTED AREA TWICE DAILY AS NEEDED     • [DISCONTINUED] bicalutamide (CASODEX) 50 MG chemo tablet   0   • [DISCONTINUED] cefdinir (OMNICEF) 300 MG capsule Take 1 capsule by mouth Every 12 (Twelve) Hours.     • [DISCONTINUED] clotrimazole-betamethasone (LOTRISONE) 1-0.05 % cream APPLY TOPICALLY TO THE AFFECTED AREA TWICE DAILY FOR 2 WEEKS     • [DISCONTINUED] fluconazole (DIFLUCAN) 150 MG tablet Take 150 mg by mouth Daily.     • [DISCONTINUED] methylPREDNISolone (MEDROL) 4 MG dose pack follow package directions     • [DISCONTINUED] montelukast  (SINGULAIR) 10 MG tablet Take 10 mg by mouth Every Night.     • [DISCONTINUED] nystatin 710183 UNIT/GM powder APPLY TO THE AFFECTED AREA FOUR TIMES DAILY     • [DISCONTINUED] traMADol (ULTRAM) 50 MG tablet Take 1 tablet by mouth Every 12 (Twelve) Hours As Needed for Moderate Pain . 60 tablet 2   • [DISCONTINUED] zolpidem (AMBIEN) 10 MG tablet Take 1 tablet by mouth every night at bedtime. 30 tablet 2     No facility-administered encounter medications on file as of 8/23/2022.       Follow Up:  Return in about 1 year (around 8/23/2023) for Medicare Wellness.     Patient Instructions   Colonoscopy in 2023. To go to pharmacy for TD and Covid booster      An After Visit Summary and PPPS with all of these plans were given to the patient.       I have reviewed and edited information documented by wellness nurse and documentation on diagnoses is my own.

## 2022-08-23 NOTE — ASSESSMENT & PLAN NOTE
Patient's PSA has been going up.  Dr. Barth wants to do an orchiectomy.  I talked to him about this.  He is going to talk to Dr. Alba surgeon to see if surgery is an option at all

## 2022-09-19 RX ORDER — FLUTICASONE PROPIONATE 50 MCG
SPRAY, SUSPENSION (ML) NASAL
Qty: 48 G | Refills: 0 | Status: SHIPPED | OUTPATIENT
Start: 2022-09-19 | End: 2023-02-23 | Stop reason: SDUPTHER

## 2022-09-19 RX ORDER — LEVOTHYROXINE SODIUM 0.05 MG/1
TABLET ORAL
Qty: 90 TABLET | Refills: 0 | Status: SHIPPED | OUTPATIENT
Start: 2022-09-19 | End: 2022-11-23 | Stop reason: SDUPTHER

## 2022-09-19 NOTE — TELEPHONE ENCOUNTER
Rx Refill Note    Requested Prescriptions     Pending Prescriptions Disp Refills   • fluticasone (FLONASE) 50 MCG/ACT nasal spray [Pharmacy Med Name: FLUTICASONE 50MCG YASMIN SP(120SP) RX] 48 g 0     Sig: INSTILL 1 TO 2 SPRAYS INTO EACH NOSTRIL ONCE DAILY   • levothyroxine (SYNTHROID, LEVOTHROID) 50 MCG tablet [Pharmacy Med Name: LEVOTHYROXINE 0.05MG (50MCG) TAB] 90 tablet 0     Sig: TAKE 1 TABLET BY MOUTH EVERY DAY        Last office visit with prescribing clinician: 8/23/2022      Next office visit with prescribing clinician: 11/23/2022   Last labs: 05/16/2022  Last refill:  06/24/2022  Pharmacy  [facility]

## 2022-10-14 RX ORDER — EVOLOCUMAB 140 MG/ML
INJECTION, SOLUTION SUBCUTANEOUS
Qty: 2 ML | Refills: 0 | Status: SHIPPED | OUTPATIENT
Start: 2022-10-14 | End: 2023-02-02

## 2022-10-14 NOTE — TELEPHONE ENCOUNTER
Caller: Kavon Waters Jr.    Relationship: Self    Best call back number: [phone]    Requested Prescriptions:   Requested Prescriptions     Pending Prescriptions Disp Refills   • Repatha SureClick solution auto-injector SureClick injection [Pharmacy Med Name: REPATHA SRCLK 140MG/ML PF AUTO INJ] 2 mL 0     Sig: INJECT CONTENTS OF 1 SYRINGE UNDER THE SKIN EVERY 2 WEEKS        [id number] [address] AT [facility] - [phone] [facility] [phone] FX     Additional details provided by patient: OUT OF MEDICATION     Does the patient have less than a 3 day supply:  [x] Yes  [] No    Salma Lund Rep   10/14/22 12:08 EDT

## 2022-10-14 NOTE — TELEPHONE ENCOUNTER
Rx Refill Note    Requested Prescriptions     Pending Prescriptions Disp Refills   • Repatha SureClick solution auto-injector SureClick injection [Pharmacy Med Name: REPATHA SRCLK 140MG/ML PF AUTO INJ] 2 mL 0     Sig: INJECT CONTENTS OF 1 SYRINGE UNDER THE SKIN EVERY 2 WEEKS        Last office visit with prescribing clinician: 8/23/2022      Next office visit with prescribing clinician: 11/23/2022   Last labs:   Last refill: 05/10/2022   Pharmacy (be sure to add in Epic). correct

## 2022-10-15 DIAGNOSIS — K21.9 GASTROESOPHAGEAL REFLUX DISEASE WITHOUT ESOPHAGITIS: ICD-10-CM

## 2022-10-17 RX ORDER — OMEPRAZOLE 40 MG/1
CAPSULE, DELAYED RELEASE ORAL
Qty: 180 CAPSULE | Refills: 0 | Status: SHIPPED | OUTPATIENT
Start: 2022-10-17 | End: 2022-10-31

## 2022-10-17 NOTE — TELEPHONE ENCOUNTER
Rx Refill Note    Requested Prescriptions     Pending Prescriptions Disp Refills   • omeprazole (priLOSEC) 40 MG capsule [Pharmacy Med Name: OMEPRAZOLE 40MG CAPSULES] 180 capsule 0     Sig: TAKE 1 CAPSULE BY MOUTH TWICE DAILY BEFORE A MEAL        Last office visit with prescribing clinician: 8/23/2022      Next office visit with prescribing clinician: 11/23/2022   Last labs:   Last refill: 07/26/2022   Pharmacy (be sure to add in Epic). correct

## 2022-10-20 DIAGNOSIS — K59.00 CONSTIPATION, UNSPECIFIED CONSTIPATION TYPE: ICD-10-CM

## 2022-10-20 NOTE — TELEPHONE ENCOUNTER
Rx Refill Note    Requested Prescriptions     Pending Prescriptions Disp Refills   • lubiprostone (AMITIZA) 24 MCG capsule [Pharmacy Med Name: LUBIPROSTONE 24MCG CAPSULES] 180 capsule 0     Sig: TAKE ONE CAPSULE BY MOUTH TWICE DAILY WITH FOOD OR WATER        Last office visit with prescribing clinician: 8/23/2022      Next office visit with prescribing clinician: 11/23/2022   Last labs:   Last refill: 05/25/2022   Pharmacy (be sure to add in Epic). correct

## 2022-10-21 RX ORDER — LUBIPROSTONE 24 UG/1
CAPSULE ORAL
Qty: 180 CAPSULE | Refills: 3 | Status: SHIPPED | OUTPATIENT
Start: 2022-10-21 | End: 2022-10-24

## 2022-10-23 DIAGNOSIS — K59.00 CONSTIPATION, UNSPECIFIED CONSTIPATION TYPE: ICD-10-CM

## 2022-10-23 DIAGNOSIS — E03.9 HYPOTHYROIDISM, UNSPECIFIED TYPE: ICD-10-CM

## 2022-10-24 RX ORDER — LUBIPROSTONE 24 UG/1
CAPSULE ORAL
Qty: 180 CAPSULE | Refills: 3 | Status: SHIPPED | OUTPATIENT
Start: 2022-10-24 | End: 2023-03-24

## 2022-10-24 RX ORDER — LIOTHYRONINE SODIUM 5 UG/1
TABLET ORAL
Qty: 180 TABLET | Refills: 0 | Status: SHIPPED | OUTPATIENT
Start: 2022-10-24 | End: 2023-01-11

## 2022-10-24 NOTE — TELEPHONE ENCOUNTER
Rx Refill Note    Requested Prescriptions     Pending Prescriptions Disp Refills   • lubiprostone (AMITIZA) 24 MCG capsule [Pharmacy Med Name: LUBIPROSTONE 24MCG CAPSULES] 180 capsule 3     Sig: TAKE ONE CAPSULE BY MOUTH TWICE DAILY WITH FOOD OR WATER   • liothyronine (CYTOMEL) 5 MCG tablet [Pharmacy Med Name: LIOTHYRONINE 5MCG TABLETS] 180 tablet 0     Sig: TAKE 1 TABLET BY MOUTH TWICE DAILY        Last office visit with prescribing clinician: 8/23/2022      Next office visit with prescribing clinician: 11/23/2022   Last labs:   Last refill: should just need the liothyronine sent in   Pharmacy (be sure to add in Epic). correct

## 2022-10-31 DIAGNOSIS — I73.9 PAD (PERIPHERAL ARTERY DISEASE): ICD-10-CM

## 2022-10-31 RX ORDER — CLOPIDOGREL BISULFATE 75 MG/1
TABLET ORAL
Qty: 90 TABLET | Refills: 0 | Status: SHIPPED | OUTPATIENT
Start: 2022-10-31 | End: 2023-02-08

## 2022-10-31 NOTE — TELEPHONE ENCOUNTER
Rx Refill Note    Requested Prescriptions     Pending Prescriptions Disp Refills   • clopidogrel (PLAVIX) 75 MG tablet [Pharmacy Med Name: CLOPIDOGREL 75MG TABLETS] 90 tablet 0     Sig: TAKE 1 TABLET BY MOUTH EVERY DAY        Last office visit with prescribing clinician: 8/23/2022      Next office visit with prescribing clinician: 11/23/2022   Last labs: 08/23/2022  Last refill:  02/01/2022  Pharmacy  [facility]

## 2022-11-17 RX ORDER — METHOCARBAMOL 750 MG/1
TABLET, FILM COATED ORAL
Qty: 60 TABLET | OUTPATIENT
Start: 2022-11-17

## 2022-11-18 ENCOUNTER — LAB (OUTPATIENT)
Dept: FAMILY MEDICINE CLINIC | Facility: CLINIC | Age: 72
End: 2022-11-18

## 2022-11-18 DIAGNOSIS — F51.01 PRIMARY INSOMNIA: ICD-10-CM

## 2022-11-18 DIAGNOSIS — E03.9 ACQUIRED HYPOTHYROIDISM: ICD-10-CM

## 2022-11-18 DIAGNOSIS — I25.119 CORONARY ARTERY DISEASE INVOLVING NATIVE CORONARY ARTERY OF NATIVE HEART WITH ANGINA PECTORIS: ICD-10-CM

## 2022-11-18 DIAGNOSIS — R73.9 HYPERGLYCEMIA: ICD-10-CM

## 2022-11-18 DIAGNOSIS — G89.29 CHRONIC MIDLINE LOW BACK PAIN WITHOUT SCIATICA: ICD-10-CM

## 2022-11-18 DIAGNOSIS — M54.50 CHRONIC MIDLINE LOW BACK PAIN WITHOUT SCIATICA: ICD-10-CM

## 2022-11-18 PROCEDURE — 36415 COLL VENOUS BLD VENIPUNCTURE: CPT | Performed by: FAMILY MEDICINE

## 2022-11-18 RX ORDER — CYCLOBENZAPRINE HCL 10 MG
10 TABLET ORAL 2 TIMES DAILY PRN
Qty: 90 TABLET | Refills: 0 | Status: SHIPPED | OUTPATIENT
Start: 2022-11-18 | End: 2023-02-23

## 2022-11-18 NOTE — TELEPHONE ENCOUNTER
Rx Refill Note    Requested Prescriptions     Pending Prescriptions Disp Refills   • cyclobenzaprine (FLEXERIL) 10 MG tablet 90 tablet 0     Sig: Take 1 tablet by mouth 2 (Two) Times a Day As Needed for Muscle Spasms.        Last office visit with prescribing clinician: 8/23/2022      Next office visit with prescribing clinician: 11/23/2022   Last labs: today  Last refill:    Pharmacy [facility]

## 2022-11-19 LAB
ALBUMIN SERPL-MCNC: 4.7 G/DL (ref 3.7–4.7)
ALBUMIN/GLOB SERPL: 1.8 {RATIO} (ref 1.2–2.2)
ALP SERPL-CCNC: 75 IU/L (ref 44–121)
ALT SERPL-CCNC: 27 IU/L (ref 0–44)
AST SERPL-CCNC: 29 IU/L (ref 0–40)
BASOPHILS # BLD AUTO: 0.1 X10E3/UL (ref 0–0.2)
BASOPHILS NFR BLD AUTO: 1 %
BILIRUB SERPL-MCNC: 0.5 MG/DL (ref 0–1.2)
BUN SERPL-MCNC: 19 MG/DL (ref 8–27)
BUN/CREAT SERPL: 13 (ref 10–24)
CALCIUM SERPL-MCNC: 9.9 MG/DL (ref 8.6–10.2)
CHLORIDE SERPL-SCNC: 96 MMOL/L (ref 96–106)
CHOLEST SERPL-MCNC: 191 MG/DL (ref 100–199)
CO2 SERPL-SCNC: 25 MMOL/L (ref 20–29)
CREAT SERPL-MCNC: 1.41 MG/DL (ref 0.76–1.27)
EGFRCR SERPLBLD CKD-EPI 2021: 53 ML/MIN/1.73
EOSINOPHIL # BLD AUTO: 0.3 X10E3/UL (ref 0–0.4)
EOSINOPHIL NFR BLD AUTO: 6 %
ERYTHROCYTE [DISTWIDTH] IN BLOOD BY AUTOMATED COUNT: 12.2 % (ref 11.6–15.4)
GLOBULIN SER CALC-MCNC: 2.6 G/DL (ref 1.5–4.5)
GLUCOSE SERPL-MCNC: 112 MG/DL (ref 70–99)
HBA1C MFR BLD: 5.8 % (ref 4.8–5.6)
HCT VFR BLD AUTO: 41.3 % (ref 37.5–51)
HDLC SERPL-MCNC: 46 MG/DL
HGB BLD-MCNC: 14.3 G/DL (ref 13–17.7)
IMM GRANULOCYTES # BLD AUTO: 0 X10E3/UL (ref 0–0.1)
IMM GRANULOCYTES NFR BLD AUTO: 0 %
LDLC SERPL CALC-MCNC: 118 MG/DL (ref 0–99)
LYMPHOCYTES # BLD AUTO: 1.3 X10E3/UL (ref 0.7–3.1)
LYMPHOCYTES NFR BLD AUTO: 22 %
MCH RBC QN AUTO: 31.3 PG (ref 26.6–33)
MCHC RBC AUTO-ENTMCNC: 34.6 G/DL (ref 31.5–35.7)
MCV RBC AUTO: 90 FL (ref 79–97)
MONOCYTES # BLD AUTO: 0.6 X10E3/UL (ref 0.1–0.9)
MONOCYTES NFR BLD AUTO: 10 %
NEUTROPHILS # BLD AUTO: 3.5 X10E3/UL (ref 1.4–7)
NEUTROPHILS NFR BLD AUTO: 61 %
PLATELET # BLD AUTO: 239 X10E3/UL (ref 150–450)
POTASSIUM SERPL-SCNC: 4.8 MMOL/L (ref 3.5–5.2)
PROT SERPL-MCNC: 7.3 G/DL (ref 6–8.5)
RBC # BLD AUTO: 4.57 X10E6/UL (ref 4.14–5.8)
SODIUM SERPL-SCNC: 134 MMOL/L (ref 134–144)
TRIGL SERPL-MCNC: 153 MG/DL (ref 0–149)
TSH SERPL DL<=0.005 MIU/L-ACNC: 3.6 UIU/ML (ref 0.45–4.5)
VLDLC SERPL CALC-MCNC: 27 MG/DL (ref 5–40)
WBC # BLD AUTO: 5.8 X10E3/UL (ref 3.4–10.8)

## 2022-11-23 ENCOUNTER — OFFICE VISIT (OUTPATIENT)
Dept: FAMILY MEDICINE CLINIC | Facility: CLINIC | Age: 72
End: 2022-11-23

## 2022-11-23 ENCOUNTER — TELEPHONE (OUTPATIENT)
Dept: FAMILY MEDICINE CLINIC | Facility: CLINIC | Age: 72
End: 2022-11-23

## 2022-11-23 VITALS
TEMPERATURE: 97.4 F | WEIGHT: 205 LBS | OXYGEN SATURATION: 98 % | RESPIRATION RATE: 12 BRPM | SYSTOLIC BLOOD PRESSURE: 140 MMHG | HEIGHT: 72 IN | DIASTOLIC BLOOD PRESSURE: 80 MMHG | BODY MASS INDEX: 27.77 KG/M2 | HEART RATE: 60 BPM

## 2022-11-23 DIAGNOSIS — M54.50 CHRONIC MIDLINE LOW BACK PAIN WITHOUT SCIATICA: ICD-10-CM

## 2022-11-23 DIAGNOSIS — N18.31 CHRONIC KIDNEY DISEASE, STAGE 3A: ICD-10-CM

## 2022-11-23 DIAGNOSIS — E78.2 HYPERLIPIDEMIA, MIXED: Primary | ICD-10-CM

## 2022-11-23 DIAGNOSIS — I25.10 CORONARY ARTERY DISEASE INVOLVING NATIVE CORONARY ARTERY OF NATIVE HEART WITHOUT ANGINA PECTORIS: ICD-10-CM

## 2022-11-23 DIAGNOSIS — I10 ESSENTIAL HYPERTENSION: ICD-10-CM

## 2022-11-23 DIAGNOSIS — R73.9 HYPERGLYCEMIA: ICD-10-CM

## 2022-11-23 DIAGNOSIS — G89.29 CHRONIC MIDLINE LOW BACK PAIN WITHOUT SCIATICA: ICD-10-CM

## 2022-11-23 DIAGNOSIS — F51.01 PRIMARY INSOMNIA: ICD-10-CM

## 2022-11-23 DIAGNOSIS — E03.9 ACQUIRED HYPOTHYROIDISM: ICD-10-CM

## 2022-11-23 DIAGNOSIS — I25.10 CORONARY ARTERY DISEASE INVOLVING NATIVE HEART WITHOUT ANGINA PECTORIS, UNSPECIFIED VESSEL OR LESION TYPE: ICD-10-CM

## 2022-11-23 DIAGNOSIS — L03.313 CELLULITIS OF CHEST WALL: ICD-10-CM

## 2022-11-23 PROCEDURE — 99214 OFFICE O/P EST MOD 30 MIN: CPT | Performed by: FAMILY MEDICINE

## 2022-11-23 RX ORDER — LEVOTHYROXINE SODIUM 0.05 MG/1
50 TABLET ORAL DAILY
Qty: 90 TABLET | Refills: 1 | Status: SHIPPED | OUTPATIENT
Start: 2022-11-23

## 2022-11-23 RX ORDER — METHOCARBAMOL 750 MG/1
750 TABLET, FILM COATED ORAL 2 TIMES DAILY
Qty: 180 TABLET | Refills: 1 | Status: SHIPPED | OUTPATIENT
Start: 2022-11-23 | End: 2023-02-23 | Stop reason: SDUPTHER

## 2022-11-23 RX ORDER — SULFAMETHOXAZOLE AND TRIMETHOPRIM 800; 160 MG/1; MG/1
1 TABLET ORAL EVERY 12 HOURS
COMMUNITY
Start: 2022-10-26

## 2022-11-23 RX ORDER — ZOLPIDEM TARTRATE 10 MG/1
10 TABLET ORAL
Qty: 30 TABLET | Refills: 2 | Status: SHIPPED | OUTPATIENT
Start: 2022-11-23 | End: 2023-02-23 | Stop reason: SDUPTHER

## 2022-11-23 RX ORDER — OXYCODONE AND ACETAMINOPHEN 7.5; 325 MG/1; MG/1
1 TABLET ORAL EVERY 6 HOURS PRN
COMMUNITY
Start: 2022-10-26

## 2022-11-23 RX ORDER — TRAMADOL HYDROCHLORIDE 50 MG/1
50 TABLET ORAL EVERY 12 HOURS PRN
Qty: 60 TABLET | Refills: 2 | Status: SHIPPED | OUTPATIENT
Start: 2022-11-23 | End: 2023-02-23 | Stop reason: SDUPTHER

## 2022-11-23 RX ORDER — METHOCARBAMOL 750 MG/1
750 TABLET, FILM COATED ORAL 4 TIMES DAILY PRN
COMMUNITY
End: 2022-11-23 | Stop reason: SDUPTHER

## 2022-11-23 RX ORDER — BICALUTAMIDE 50 MG/1
50 TABLET, FILM COATED ORAL DAILY
COMMUNITY
Start: 2022-10-08

## 2022-11-23 NOTE — ASSESSMENT & PLAN NOTE
HDL 46.  .  I am going to start him on Zetia 10 mg once a day.  He is only to take 5 mg at first for the first 3 months.  If he tolerates this then we will increase him to 10 at that time.

## 2022-11-23 NOTE — TELEPHONE ENCOUNTER
Caller: Kavon Waters Jr.    Relationship: Self    Best call back number: [phone]     What was the call regarding: PATIENT CALLED STATING THAT ZETIA HAS NOT BE SENT TO THE PHARMACY.      Do you require a callback: YES

## 2022-11-23 NOTE — PROGRESS NOTES
Patient Name: Kavon Watres Jr.  : 1950   MRN: [id number]     Chief Complaint:    Chief Complaint   Patient presents with   • LAB RESULTS     PT HERE FOR LAB RESULTS   • Hypothyroidism       History of Present Illness: Kavon Waters Jr. is a 72 y.o. male who is here today for follow up on BG and BP  HPI        Review of Systems:   Review of Systems   Constitutional: Negative.    HENT: Negative.    Eyes: Negative.    Respiratory: Negative.    Cardiovascular: Negative.    Gastrointestinal: Negative.    Neurological: Negative.         Past Medical History:   Past Medical History:   Diagnosis Date   • Acquired hypothyroidism    • Allergies    • Arrhythmia    • Arthritis    • Atrial fibrillation (HCC)    • AV block, 1st degree    • Borderline diabetes    • BPH associated with nocturia    • Chronic idiopathic constipation    • CKD (chronic kidney disease), stage III (HCC)    • Constipation    • Coronary artery disease involving native coronary artery of native heart without angina pectoris    • Depression    • Disease of thyroid gland    • Eczema    • Essential hypertension    • Full dentures    • GERD (gastroesophageal reflux disease)    • H/O heart artery stent     X 7   • High risk medication use    • Ischemic cardiomyopathy    • Long term (current) use of anticoagulants    • Low sodium levels    • Lower back pain    • Mixed hyperlipidemia    • PAUL (obstructive sleep apnea)     NO CPAP USE   • Paroxysmal A-fib (HCC)    • Primary osteoarthritis involving multiple joints    • Prostate cancer (HCC)     HAD 43 RADIATION TREATMENTS.   • Prostatic hypertrophy     Benign   • RBBB    • Recurrent major depressive disorder, in full remission (HCC)    • Seasonal allergies    • Skin cancer    • Stroke (HCC)     AFFECTED RIGHT ARM/LEG.  PT DENIES RESIDUAL DEFICITS.   • Thyroid disease    • Wears glasses        Past Surgical History:   Past Surgical History:   Procedure Laterality Date   • CARDIAC CATHETERIZATION      • CARDIAC CATHETERIZATION N/A 2018    Procedure: Left Heart Cath;  Surgeon: Moshe Ahn MD;  Location:  [facility];  Service: Cardiovascular   • CHOLECYSTECTOMY     • CIRCUMCISION     • COLONOSCOPY  2013   • CORONARY ARTERY BYPASS GRAFT N/A 2018    Procedure: MEDIASTERNOTOMY CORONARY ARTERY BYPASS GRAFT X2 UTILIZING LEFT INTERNAL MAMMARY ARTERY, EVH LEFT GREATER SAPHENOUS VEIN;  Surgeon: Rakesh Mittal MD;  Location:  [facility];  Service: Cardiothoracic   • CORONARY STENT PLACEMENT     • SKIN CANCER EXCISION         Family History:   Family History   Problem Relation Age of Onset   • Kidney failure Mother         COMPLICATIONS OF DIALYSIS       Social History:   Social History     Socioeconomic History   • Marital status:      Spouse name: Mara   • Number of children: 2   • Highest education level: 9th grade   Tobacco Use   • Smoking status: Former     Packs/day: 1.00     Years: 23.00     Pack years: 23.00     Types: Cigarettes     Start date:      Quit date: 1988     Years since quittin.6   • Smokeless tobacco: Never   • Tobacco comments:     QUIT 30 YEARS AGO   Vaping Use   • Vaping Use: Never used   Substance and Sexual Activity   • Alcohol use: No   • Drug use: No   • Sexual activity: Defer       Medications:     Current Outpatient Medications:   •  aspirin 81 MG chewable tablet, Chew 81 mg Daily., Disp: , Rfl:   •  bicalutamide (CASODEX) 50 MG chemo tablet, Take 50 mg by mouth Daily., Disp: , Rfl:   •  clopidogrel (PLAVIX) 75 MG tablet, TAKE 1 TABLET BY MOUTH EVERY DAY, Disp: 90 tablet, Rfl: 0  •  cyclobenzaprine (FLEXERIL) 10 MG tablet, Take 1 tablet by mouth 2 (Two) Times a Day As Needed for Muscle Spasms., Disp: 90 tablet, Rfl: 0  •  fluticasone (FLONASE) 50 MCG/ACT nasal spray, INSTILL 1 TO 2 SPRAYS INTO EACH NOSTRIL ONCE DAILY, Disp: 48 g, Rfl: 0  •  hydrocortisone 2.5 % cream, APPLY TO THE AFFECTED AREA TWICE DAILY AS NEEDED, Disp:  ", Rfl:   •  ketoconazole (NIZORAL) 2 % shampoo, USE TWICE WEEKLY AS DIRECTED, Disp: , Rfl:   •  levothyroxine (SYNTHROID, LEVOTHROID) 50 MCG tablet, Take 1 tablet by mouth Daily., Disp: 90 tablet, Rfl: 1  •  liothyronine (CYTOMEL) 5 MCG tablet, TAKE 1 TABLET BY MOUTH TWICE DAILY, Disp: 180 tablet, Rfl: 0  •  losartan (COZAAR) 100 MG tablet, Take 1 tablet by mouth Daily., Disp: 90 tablet, Rfl: 3  •  lubiprostone (AMITIZA) 24 MCG capsule, TAKE ONE CAPSULE BY MOUTH TWICE DAILY WITH FOOD OR WATER, Disp: 180 capsule, Rfl: 3  •  methocarbamol (ROBAXIN) 750 MG tablet, Take 1 tablet by mouth 2 (Two) Times a Day., Disp: 180 tablet, Rfl: 1  •  oxyCODONE-acetaminophen (PERCOCET) 7.5-325 MG per tablet, Take 1 tablet by mouth Every 6 (Six) Hours As Needed., Disp: , Rfl:   •  Repatha SureClick solution auto-injector SureClick injection, INJECT CONTENTS OF 1 SYRINGE UNDER THE SKIN EVERY 2 WEEKS, Disp: 2 mL, Rfl: 0  •  sulfamethoxazole-trimethoprim (BACTRIM DS,SEPTRA DS) 800-160 MG per tablet, Take 1 tablet by mouth Every 12 (Twelve) Hours., Disp: , Rfl:   •  tacrolimus (PROTOPIC) 0.1 % ointment, APPLY TOPICALLY TO THE AFFECTED AREA TWICE DAILY AS NEEDED, Disp: , Rfl:   •  traMADol (ULTRAM) 50 MG tablet, Take 1 tablet by mouth Every 12 (Twelve) Hours As Needed for Moderate Pain., Disp: 60 tablet, Rfl: 2  •  zolpidem (AMBIEN) 10 MG tablet, Take 1 tablet by mouth every night at bedtime., Disp: 30 tablet, Rfl: 2    Allergies:   Allergies   Allergen Reactions   • Ibuprofen Other (See Comments)     Instructed not to take due to his kidneys         Physical Exam:  Vital Signs:   Vitals:    11/23/22 1027   BP: 140/80   BP Location: Left arm   Patient Position: Sitting   Cuff Size: Adult   Pulse: 60   Resp: 12   Temp: 97.4 °F (36.3 °C)   TempSrc: Temporal   SpO2: 98%   Weight: 93 kg (205 lb)   Height: 182.9 cm (72\")   PainSc: 0-No pain     Body mass index is 27.8 kg/m².     Physical Exam  Vitals and nursing note reviewed.   Constitutional: "       Appearance: Normal appearance. He is normal weight.   HENT:      Head: Normocephalic and atraumatic.      Right Ear: Tympanic membrane, ear canal and external ear normal.      Left Ear: Tympanic membrane, ear canal and external ear normal.      Nose: Nose normal.      Mouth/Throat:      Mouth: Mucous membranes are dry.      Pharynx: Oropharynx is clear.   Eyes:      Extraocular Movements: Extraocular movements intact.      Conjunctiva/sclera: Conjunctivae normal.      Pupils: Pupils are equal, round, and reactive to light.   Cardiovascular:      Rate and Rhythm: Normal rate and regular rhythm.      Pulses: Normal pulses.      Heart sounds: Normal heart sounds.   Pulmonary:      Effort: Pulmonary effort is normal.      Breath sounds: Normal breath sounds.   Musculoskeletal:      Cervical back: Normal range of motion and neck supple.   Feet:      Comments:      Neurological:      Mental Status: He is alert.         Procedures      Assessment/Plan:   Diagnoses and all orders for this visit:    1. Hyperlipidemia, mixed (Primary)  Assessment & Plan:  HDL 46.  .  I am going to start him on Zetia 10 mg once a day.  He is only to take 5 mg at first for the first 3 months.  If he tolerates this then we will increase him to 10 at that time.    Orders:  -     zolpidem (AMBIEN) 10 MG tablet; Take 1 tablet by mouth every night at bedtime.  Dispense: 30 tablet; Refill: 2  -     traMADol (ULTRAM) 50 MG tablet; Take 1 tablet by mouth Every 12 (Twelve) Hours As Needed for Moderate Pain.  Dispense: 60 tablet; Refill: 2  -     Hemoglobin A1c; Future  -     Lipid Panel; Future  -     Comprehensive Metabolic Panel; Future  -     TSH Rfx On Abnormal To Free T4; Future  -     CBC & Differential; Future    2. Essential hypertension  Assessment & Plan:  Discussed with patient to monitor their blood pressure and if systolic blood pressure goes above 140 or diastolic is above 90 to return to clinic.  Take medicines as directed,  call for any problems, patient not having or any worrisome symptoms.        Orders:  -     Hemoglobin A1c; Future  -     Lipid Panel; Future  -     Comprehensive Metabolic Panel; Future  -     TSH Rfx On Abnormal To Free T4; Future  -     CBC & Differential; Future    3. Coronary artery disease involving native coronary artery of native heart without angina pectoris  Assessment & Plan:  Aggressive risk factor modification.    Orders:  -     Hemoglobin A1c; Future  -     Lipid Panel; Future  -     Comprehensive Metabolic Panel; Future  -     TSH Rfx On Abnormal To Free T4; Future  -     CBC & Differential; Future    4. Acquired hypothyroidism  Assessment & Plan:  TSH 3.6.  Recheck in 6 months.    Orders:  -     zolpidem (AMBIEN) 10 MG tablet; Take 1 tablet by mouth every night at bedtime.  Dispense: 30 tablet; Refill: 2  -     traMADol (ULTRAM) 50 MG tablet; Take 1 tablet by mouth Every 12 (Twelve) Hours As Needed for Moderate Pain.  Dispense: 60 tablet; Refill: 2  -     Hemoglobin A1c; Future  -     Lipid Panel; Future  -     Comprehensive Metabolic Panel; Future  -     TSH Rfx On Abnormal To Free T4; Future  -     CBC & Differential; Future    5. Chronic kidney disease, stage 3a (HCC)  -     zolpidem (AMBIEN) 10 MG tablet; Take 1 tablet by mouth every night at bedtime.  Dispense: 30 tablet; Refill: 2  -     traMADol (ULTRAM) 50 MG tablet; Take 1 tablet by mouth Every 12 (Twelve) Hours As Needed for Moderate Pain.  Dispense: 60 tablet; Refill: 2  -     Hemoglobin A1c; Future  -     Lipid Panel; Future  -     Comprehensive Metabolic Panel; Future  -     TSH Rfx On Abnormal To Free T4; Future  -     CBC & Differential; Future    6. Primary insomnia  Assessment & Plan:  Refill Ambien.    Orders:  -     zolpidem (AMBIEN) 10 MG tablet; Take 1 tablet by mouth every night at bedtime.  Dispense: 30 tablet; Refill: 2  -     traMADol (ULTRAM) 50 MG tablet; Take 1 tablet by mouth Every 12 (Twelve) Hours As Needed for Moderate  Pain.  Dispense: 60 tablet; Refill: 2  -     Hemoglobin A1c; Future  -     Lipid Panel; Future  -     Comprehensive Metabolic Panel; Future  -     TSH Rfx On Abnormal To Free T4; Future  -     CBC & Differential; Future    7. Chronic midline low back pain without sciatica  -     zolpidem (AMBIEN) 10 MG tablet; Take 1 tablet by mouth every night at bedtime.  Dispense: 30 tablet; Refill: 2  -     traMADol (ULTRAM) 50 MG tablet; Take 1 tablet by mouth Every 12 (Twelve) Hours As Needed for Moderate Pain.  Dispense: 60 tablet; Refill: 2  -     Hemoglobin A1c; Future  -     Lipid Panel; Future  -     Comprehensive Metabolic Panel; Future  -     TSH Rfx On Abnormal To Free T4; Future  -     CBC & Differential; Future    8. Hyperglycemia  -     zolpidem (AMBIEN) 10 MG tablet; Take 1 tablet by mouth every night at bedtime.  Dispense: 30 tablet; Refill: 2  -     traMADol (ULTRAM) 50 MG tablet; Take 1 tablet by mouth Every 12 (Twelve) Hours As Needed for Moderate Pain.  Dispense: 60 tablet; Refill: 2  -     Hemoglobin A1c; Future  -     Lipid Panel; Future  -     Comprehensive Metabolic Panel; Future  -     TSH Rfx On Abnormal To Free T4; Future  -     CBC & Differential; Future    9. Coronary artery disease involving native heart without angina pectoris, unspecified vessel or lesion type  Assessment & Plan:  Aggressive risk factor modification.    Orders:  -     zolpidem (AMBIEN) 10 MG tablet; Take 1 tablet by mouth every night at bedtime.  Dispense: 30 tablet; Refill: 2  -     traMADol (ULTRAM) 50 MG tablet; Take 1 tablet by mouth Every 12 (Twelve) Hours As Needed for Moderate Pain.  Dispense: 60 tablet; Refill: 2  -     Hemoglobin A1c; Future  -     Lipid Panel; Future  -     Comprehensive Metabolic Panel; Future  -     TSH Rfx On Abnormal To Free T4; Future  -     CBC & Differential; Future    10. Cellulitis of chest wall  -     zolpidem (AMBIEN) 10 MG tablet; Take 1 tablet by mouth every night at bedtime.  Dispense: 30  tablet; Refill: 2  -     traMADol (ULTRAM) 50 MG tablet; Take 1 tablet by mouth Every 12 (Twelve) Hours As Needed for Moderate Pain.  Dispense: 60 tablet; Refill: 2  -     Hemoglobin A1c; Future  -     Lipid Panel; Future  -     Comprehensive Metabolic Panel; Future  -     TSH Rfx On Abnormal To Free T4; Future  -     CBC & Differential; Future    Other orders  -     levothyroxine (SYNTHROID, LEVOTHROID) 50 MCG tablet; Take 1 tablet by mouth Daily.  Dispense: 90 tablet; Refill: 1  -     methocarbamol (ROBAXIN) 750 MG tablet; Take 1 tablet by mouth 2 (Two) Times a Day.  Dispense: 180 tablet; Refill: 1           Follow Up:   Return in about 3 months (around 2/23/2023) for Annual physical.    Mina Story MD  [facility]

## 2022-11-23 NOTE — TELEPHONE ENCOUNTER
Rx Refill Note    Requested Prescriptions     Pending Prescriptions Disp Refills   • ezetimibe (Zetia) 10 MG tablet 30 tablet 5     Sig: Take 1 tablet by mouth Daily.        Last office visit with prescribing clinician: 11/23/2022      Next office visit with prescribing clinician: 2/23/2023   Last labs:   Last refill: Dr. Story, this was not on his active med list via [facility]   Pharmacy (be sure to add in Epic). correct

## 2022-11-28 RX ORDER — EZETIMIBE 10 MG/1
10 TABLET ORAL DAILY
Qty: 30 TABLET | Refills: 5 | Status: SHIPPED | OUTPATIENT
Start: 2022-11-28

## 2022-11-28 NOTE — TELEPHONE ENCOUNTER
Called to inform patient that this med was sent in.  No answer, left message to call back.    (hub to read)

## 2023-01-08 DIAGNOSIS — K21.9 GASTROESOPHAGEAL REFLUX DISEASE WITHOUT ESOPHAGITIS: ICD-10-CM

## 2023-01-09 RX ORDER — OMEPRAZOLE 40 MG/1
CAPSULE, DELAYED RELEASE ORAL
Qty: 180 CAPSULE | Refills: 1 | Status: SHIPPED | OUTPATIENT
Start: 2023-01-09

## 2023-01-09 NOTE — TELEPHONE ENCOUNTER
Rx Refill Note    Requested Prescriptions     Pending Prescriptions Disp Refills   • omeprazole (priLOSEC) 40 MG capsule [Pharmacy Med Name: OMEPRAZOLE 40MG CAPSULES] 180 capsule 0     Sig: TAKE 1 CAPSULE BY MOUTH TWICE DAILY BEFORE A MEAL        Last office visit with prescribing clinician: 11/23/2022      Next office visit with prescribing clinician: 2/23/2023   Last labs:  Last refill:    Pharmacy  [facility]

## 2023-01-10 DIAGNOSIS — E03.9 HYPOTHYROIDISM, UNSPECIFIED TYPE: ICD-10-CM

## 2023-01-10 NOTE — TELEPHONE ENCOUNTER
Rx Refill Note    Requested Prescriptions     Pending Prescriptions Disp Refills   • liothyronine (CYTOMEL) 5 MCG tablet [Pharmacy Med Name: LIOTHYRONINE 5MCG TABLETS] 180 tablet 0     Sig: TAKE 1 TABLET BY MOUTH TWICE DAILY        Last office visit with prescribing clinician: 11/23/2022      Next office visit with prescribing clinician: 2/23/2023   Last labs:   Last refill: 10/24/2022   Pharmacy (be sure to add in Epic). correct

## 2023-01-11 RX ORDER — LIOTHYRONINE SODIUM 5 UG/1
TABLET ORAL
Qty: 180 TABLET | Refills: 0 | Status: SHIPPED | OUTPATIENT
Start: 2023-01-11 | End: 2023-03-20

## 2023-02-02 RX ORDER — EVOLOCUMAB 140 MG/ML
INJECTION, SOLUTION SUBCUTANEOUS
Qty: 2 ML | Refills: 0 | Status: SHIPPED | OUTPATIENT
Start: 2023-02-02 | End: 2023-03-09

## 2023-02-02 NOTE — TELEPHONE ENCOUNTER
Rx Refill Note    Requested Prescriptions     Pending Prescriptions Disp Refills   • Repatha SureClick solution auto-injector SureClick injection [Pharmacy Med Name: REPATHA SRCLK 140MG/ML PF AUTO INJ] 2 mL 0     Sig: INJECT CONTENTS OF 1 SYRINGE UNDER THE SKIN EVERY 2 WEEKS        Last office visit with prescribing clinician: 11/23/2022      Next office visit with prescribing clinician: 2/23/2023   Last labs:   Last refill: 10/14/2022   Pharmacy (be sure to add in Epic). correct

## 2023-02-08 DIAGNOSIS — I73.9 PAD (PERIPHERAL ARTERY DISEASE): ICD-10-CM

## 2023-02-08 RX ORDER — CLOPIDOGREL BISULFATE 75 MG/1
TABLET ORAL
Qty: 90 TABLET | Refills: 0 | Status: SHIPPED | OUTPATIENT
Start: 2023-02-08

## 2023-02-16 ENCOUNTER — LAB (OUTPATIENT)
Dept: FAMILY MEDICINE CLINIC | Facility: CLINIC | Age: 73
End: 2023-02-16
Payer: MEDICARE

## 2023-02-16 DIAGNOSIS — I25.10 CORONARY ARTERY DISEASE INVOLVING NATIVE HEART WITHOUT ANGINA PECTORIS, UNSPECIFIED VESSEL OR LESION TYPE: ICD-10-CM

## 2023-02-16 DIAGNOSIS — G89.29 CHRONIC MIDLINE LOW BACK PAIN WITHOUT SCIATICA: ICD-10-CM

## 2023-02-16 DIAGNOSIS — L03.313 CELLULITIS OF CHEST WALL: ICD-10-CM

## 2023-02-16 DIAGNOSIS — I10 ESSENTIAL HYPERTENSION: ICD-10-CM

## 2023-02-16 DIAGNOSIS — R73.9 HYPERGLYCEMIA: ICD-10-CM

## 2023-02-16 DIAGNOSIS — E03.9 ACQUIRED HYPOTHYROIDISM: ICD-10-CM

## 2023-02-16 DIAGNOSIS — I25.10 CORONARY ARTERY DISEASE INVOLVING NATIVE CORONARY ARTERY OF NATIVE HEART WITHOUT ANGINA PECTORIS: ICD-10-CM

## 2023-02-16 DIAGNOSIS — F51.01 PRIMARY INSOMNIA: ICD-10-CM

## 2023-02-16 DIAGNOSIS — E78.2 HYPERLIPIDEMIA, MIXED: ICD-10-CM

## 2023-02-16 DIAGNOSIS — N18.31 CHRONIC KIDNEY DISEASE, STAGE 3A: ICD-10-CM

## 2023-02-16 DIAGNOSIS — M54.50 CHRONIC MIDLINE LOW BACK PAIN WITHOUT SCIATICA: ICD-10-CM

## 2023-02-16 PROCEDURE — 36415 COLL VENOUS BLD VENIPUNCTURE: CPT | Performed by: FAMILY MEDICINE

## 2023-02-17 LAB
ALBUMIN SERPL-MCNC: 4.3 G/DL (ref 3.7–4.7)
ALBUMIN/GLOB SERPL: 1.7 {RATIO} (ref 1.2–2.2)
ALP SERPL-CCNC: 69 IU/L (ref 44–121)
ALT SERPL-CCNC: 14 IU/L (ref 0–44)
AST SERPL-CCNC: 20 IU/L (ref 0–40)
BASOPHILS # BLD AUTO: 0.1 X10E3/UL (ref 0–0.2)
BASOPHILS NFR BLD AUTO: 2 %
BILIRUB SERPL-MCNC: 0.5 MG/DL (ref 0–1.2)
BUN SERPL-MCNC: 23 MG/DL (ref 8–27)
BUN/CREAT SERPL: 17 (ref 10–24)
CALCIUM SERPL-MCNC: 9.6 MG/DL (ref 8.6–10.2)
CHLORIDE SERPL-SCNC: 99 MMOL/L (ref 96–106)
CHOLEST SERPL-MCNC: 188 MG/DL (ref 100–199)
CO2 SERPL-SCNC: 22 MMOL/L (ref 20–29)
CREAT SERPL-MCNC: 1.32 MG/DL (ref 0.76–1.27)
EGFRCR SERPLBLD CKD-EPI 2021: 57 ML/MIN/1.73
EOSINOPHIL # BLD AUTO: 0.4 X10E3/UL (ref 0–0.4)
EOSINOPHIL NFR BLD AUTO: 7 %
ERYTHROCYTE [DISTWIDTH] IN BLOOD BY AUTOMATED COUNT: 13.1 % (ref 11.6–15.4)
GLOBULIN SER CALC-MCNC: 2.5 G/DL (ref 1.5–4.5)
GLUCOSE SERPL-MCNC: 102 MG/DL (ref 70–99)
HBA1C MFR BLD: 5.5 % (ref 4.8–5.6)
HCT VFR BLD AUTO: 36.1 % (ref 37.5–51)
HDLC SERPL-MCNC: 44 MG/DL
HGB BLD-MCNC: 12.1 G/DL (ref 13–17.7)
IMM GRANULOCYTES # BLD AUTO: 0 X10E3/UL (ref 0–0.1)
IMM GRANULOCYTES NFR BLD AUTO: 0 %
LDLC SERPL CALC-MCNC: 121 MG/DL (ref 0–99)
LYMPHOCYTES # BLD AUTO: 1.5 X10E3/UL (ref 0.7–3.1)
LYMPHOCYTES NFR BLD AUTO: 27 %
MCH RBC QN AUTO: 31.5 PG (ref 26.6–33)
MCHC RBC AUTO-ENTMCNC: 33.5 G/DL (ref 31.5–35.7)
MCV RBC AUTO: 94 FL (ref 79–97)
MONOCYTES # BLD AUTO: 0.5 X10E3/UL (ref 0.1–0.9)
MONOCYTES NFR BLD AUTO: 10 %
NEUTROPHILS # BLD AUTO: 3.1 X10E3/UL (ref 1.4–7)
NEUTROPHILS NFR BLD AUTO: 54 %
PLATELET # BLD AUTO: 203 X10E3/UL (ref 150–450)
POTASSIUM SERPL-SCNC: 4.6 MMOL/L (ref 3.5–5.2)
PROT SERPL-MCNC: 6.8 G/DL (ref 6–8.5)
RBC # BLD AUTO: 3.84 X10E6/UL (ref 4.14–5.8)
SODIUM SERPL-SCNC: 134 MMOL/L (ref 134–144)
TRIGL SERPL-MCNC: 127 MG/DL (ref 0–149)
TSH SERPL DL<=0.005 MIU/L-ACNC: 2.88 UIU/ML (ref 0.45–4.5)
VLDLC SERPL CALC-MCNC: 23 MG/DL (ref 5–40)
WBC # BLD AUTO: 5.6 X10E3/UL (ref 3.4–10.8)

## 2023-02-23 ENCOUNTER — OFFICE VISIT (OUTPATIENT)
Dept: FAMILY MEDICINE CLINIC | Facility: CLINIC | Age: 73
End: 2023-02-23
Payer: MEDICARE

## 2023-02-23 VITALS
TEMPERATURE: 97.8 F | BODY MASS INDEX: 27.56 KG/M2 | WEIGHT: 203.5 LBS | OXYGEN SATURATION: 99 % | DIASTOLIC BLOOD PRESSURE: 80 MMHG | HEART RATE: 63 BPM | SYSTOLIC BLOOD PRESSURE: 110 MMHG | RESPIRATION RATE: 12 BRPM | HEIGHT: 72 IN

## 2023-02-23 DIAGNOSIS — E03.9 ACQUIRED HYPOTHYROIDISM: ICD-10-CM

## 2023-02-23 DIAGNOSIS — F51.01 PRIMARY INSOMNIA: ICD-10-CM

## 2023-02-23 DIAGNOSIS — I25.10 CORONARY ARTERY DISEASE INVOLVING NATIVE HEART WITHOUT ANGINA PECTORIS, UNSPECIFIED VESSEL OR LESION TYPE: ICD-10-CM

## 2023-02-23 DIAGNOSIS — E78.2 HYPERLIPIDEMIA, MIXED: ICD-10-CM

## 2023-02-23 DIAGNOSIS — N18.31 CHRONIC KIDNEY DISEASE, STAGE 3A: ICD-10-CM

## 2023-02-23 DIAGNOSIS — L03.313 CELLULITIS OF CHEST WALL: ICD-10-CM

## 2023-02-23 DIAGNOSIS — D64.9 ANEMIA, UNSPECIFIED TYPE: Primary | ICD-10-CM

## 2023-02-23 DIAGNOSIS — M54.50 CHRONIC MIDLINE LOW BACK PAIN WITHOUT SCIATICA: ICD-10-CM

## 2023-02-23 DIAGNOSIS — R73.9 HYPERGLYCEMIA: ICD-10-CM

## 2023-02-23 DIAGNOSIS — G89.29 CHRONIC MIDLINE LOW BACK PAIN WITHOUT SCIATICA: ICD-10-CM

## 2023-02-23 PROBLEM — C61 METASTASIS FROM MALIGNANT TUMOR OF PROSTATE: Status: ACTIVE | Noted: 2022-10-08

## 2023-02-23 PROBLEM — C79.9 METASTASIS FROM MALIGNANT TUMOR OF PROSTATE: Status: ACTIVE | Noted: 2022-10-08

## 2023-02-23 PROCEDURE — 99214 OFFICE O/P EST MOD 30 MIN: CPT | Performed by: FAMILY MEDICINE

## 2023-02-23 RX ORDER — ZOLPIDEM TARTRATE 10 MG/1
10 TABLET ORAL
Qty: 30 TABLET | Refills: 2 | Status: SHIPPED | OUTPATIENT
Start: 2023-02-23 | End: 2023-02-23

## 2023-02-23 RX ORDER — FLUTICASONE PROPIONATE 50 MCG
2 SPRAY, SUSPENSION (ML) NASAL DAILY
Qty: 48 G | Refills: 1 | Status: SHIPPED | OUTPATIENT
Start: 2023-02-23

## 2023-02-23 RX ORDER — TRAMADOL HYDROCHLORIDE 50 MG/1
50 TABLET ORAL EVERY 12 HOURS PRN
Qty: 60 TABLET | Refills: 2 | Status: SHIPPED | OUTPATIENT
Start: 2023-02-23

## 2023-02-23 RX ORDER — EZETIMIBE 10 MG/1
TABLET ORAL
COMMUNITY
Start: 2022-11-28 | End: 2023-02-23 | Stop reason: SDUPTHER

## 2023-02-23 RX ORDER — CYCLOBENZAPRINE HCL 10 MG
TABLET ORAL
COMMUNITY
Start: 2022-11-18 | End: 2023-02-23 | Stop reason: SDUPTHER

## 2023-02-23 RX ORDER — FLUTICASONE PROPIONATE 0.05 %
CREAM (GRAM) TOPICAL
COMMUNITY
Start: 2022-12-07 | End: 2023-02-23 | Stop reason: SDUPTHER

## 2023-02-23 RX ORDER — FLUCONAZOLE 150 MG/1
1 TABLET ORAL WEEKLY
COMMUNITY
Start: 2023-02-13

## 2023-02-23 RX ORDER — ZOLPIDEM TARTRATE 12.5 MG/1
12.5 TABLET, FILM COATED, EXTENDED RELEASE ORAL NIGHTLY PRN
Qty: 30 TABLET | Refills: 2 | Status: SHIPPED | OUTPATIENT
Start: 2023-02-23

## 2023-02-23 RX ORDER — METHOCARBAMOL 750 MG/1
750 TABLET, FILM COATED ORAL 2 TIMES DAILY
Qty: 180 TABLET | Refills: 1 | Status: SHIPPED | OUTPATIENT
Start: 2023-02-23

## 2023-02-23 RX ORDER — ZOLPIDEM TARTRATE 10 MG/1
10 TABLET ORAL NIGHTLY PRN
COMMUNITY
End: 2023-02-23 | Stop reason: SDUPTHER

## 2023-02-23 RX ORDER — KETOCONAZOLE 20 MG/ML
SHAMPOO TOPICAL WEEKLY
Qty: 120 ML | Refills: 3 | Status: SHIPPED | OUTPATIENT
Start: 2023-02-23

## 2023-02-23 RX ORDER — CETIRIZINE HYDROCHLORIDE 10 MG/1
TABLET ORAL
Qty: 90 TABLET | Refills: 1 | Status: SHIPPED | OUTPATIENT
Start: 2023-02-23

## 2023-02-23 NOTE — PROGRESS NOTES
Patient Name: Kavon Waters Jr.  : 1950   MRN: [id number]     Chief Complaint:    Chief Complaint   Patient presents with   • lab results     Pt here for lab results   • Hyperlipidemia   • Hypertension       History of Present Illness: Kavon Waters Jr. is a 72 y.o. male who is here today for follow up.  HPI        Review of Systems:   Review of Systems   Constitutional: Negative.    HENT: Negative.    Eyes: Negative.    Respiratory: Negative.    Cardiovascular: Negative.    Gastrointestinal: Negative.    Neurological: Negative.         Past Medical History:   Past Medical History:   Diagnosis Date   • Acquired hypothyroidism    • Allergies    • Arrhythmia    • Arthritis    • Atrial fibrillation (HCC)    • AV block, 1st degree    • Borderline diabetes    • BPH associated with nocturia    • Chronic idiopathic constipation    • CKD (chronic kidney disease), stage III (HCC)    • Constipation    • Coronary artery disease involving native coronary artery of native heart without angina pectoris    • Depression    • Disease of thyroid gland    • Eczema    • Essential hypertension    • Full dentures    • GERD (gastroesophageal reflux disease)    • H/O heart artery stent     X 7   • High risk medication use    • Ischemic cardiomyopathy    • Long term (current) use of anticoagulants    • Low sodium levels    • Lower back pain    • Mixed hyperlipidemia    • PAUL (obstructive sleep apnea)     NO CPAP USE   • Paroxysmal A-fib (HCC)    • Primary osteoarthritis involving multiple joints    • Prostate cancer (HCC)     HAD 43 RADIATION TREATMENTS.   • Prostatic hypertrophy     Benign   • RBBB    • Recurrent major depressive disorder, in full remission (HCC)    • Seasonal allergies    • Skin cancer    • Stroke (HCC)     AFFECTED RIGHT ARM/LEG.  PT DENIES RESIDUAL DEFICITS.   • Thyroid disease    • Wears glasses        Past Surgical History:   Past Surgical History:   Procedure Laterality Date   • CARDIAC  CATHETERIZATION     • CARDIAC CATHETERIZATION N/A 2018    Procedure: Left Heart Cath;  Surgeon: Moshe Ahn MD;  Location:  [facility];  Service: Cardiovascular   • CHOLECYSTECTOMY     • CIRCUMCISION     • COLONOSCOPY  2013   • CORONARY ARTERY BYPASS GRAFT N/A 2018    Procedure: MEDIASTERNOTOMY CORONARY ARTERY BYPASS GRAFT X2 UTILIZING LEFT INTERNAL MAMMARY ARTERY, EVH LEFT GREATER SAPHENOUS VEIN;  Surgeon: Rakesh Mittal MD;  Location:  [facility];  Service: Cardiothoracic   • CORONARY STENT PLACEMENT     • SKIN CANCER EXCISION         Family History:   Family History   Problem Relation Age of Onset   • Kidney failure Mother         COMPLICATIONS OF DIALYSIS       Social History:   Social History     Socioeconomic History   • Marital status:      Spouse name: Mara   • Number of children: 2   • Highest education level: 9th grade   Tobacco Use   • Smoking status: Former     Packs/day: 1.00     Years: 23.00     Pack years: 23.00     Types: Cigarettes     Start date:      Quit date: 1988     Years since quittin.9   • Smokeless tobacco: Never   • Tobacco comments:     QUIT 30 YEARS AGO   Vaping Use   • Vaping Use: Never used   Substance and Sexual Activity   • Alcohol use: No   • Drug use: No   • Sexual activity: Defer       Medications:     Current Outpatient Medications:   •  aspirin 81 MG chewable tablet, Chew 81 mg Daily., Disp: , Rfl:   •  bicalutamide (CASODEX) 50 MG chemo tablet, Take 50 mg by mouth Daily., Disp: , Rfl:   •  clopidogrel (PLAVIX) 75 MG tablet, TAKE 1 TABLET BY MOUTH EVERY DAY, Disp: 90 tablet, Rfl: 0  •  ezetimibe (Zetia) 10 MG tablet, Take 1 tablet by mouth Daily., Disp: 30 tablet, Rfl: 5  •  fluconazole (DIFLUCAN) 150 MG tablet, Take 1 tablet by mouth 1 (One) Time Per Week., Disp: , Rfl:   •  fluticasone (FLONASE) 50 MCG/ACT nasal spray, 2 sprays into the nostril(s) as directed by provider Daily., Disp: 48 g, Rfl: 1  •   ketoconazole (NIZORAL) 2 % shampoo, Apply  topically to the appropriate area as directed 1 (One) Time Per Week., Disp: 120 mL, Rfl: 3  •  levothyroxine (SYNTHROID, LEVOTHROID) 50 MCG tablet, Take 1 tablet by mouth Daily., Disp: 90 tablet, Rfl: 1  •  liothyronine (CYTOMEL) 5 MCG tablet, TAKE 1 TABLET BY MOUTH TWICE DAILY, Disp: 180 tablet, Rfl: 0  •  losartan (COZAAR) 100 MG tablet, Take 1 tablet by mouth Daily., Disp: 90 tablet, Rfl: 3  •  lubiprostone (AMITIZA) 24 MCG capsule, TAKE ONE CAPSULE BY MOUTH TWICE DAILY WITH FOOD OR WATER, Disp: 180 capsule, Rfl: 3  •  methocarbamol (ROBAXIN) 750 MG tablet, Take 1 tablet by mouth 2 (Two) Times a Day., Disp: 180 tablet, Rfl: 1  •  omeprazole (priLOSEC) 40 MG capsule, TAKE 1 CAPSULE BY MOUTH TWICE DAILY BEFORE A MEAL, Disp: 180 capsule, Rfl: 1  •  oxyCODONE-acetaminophen (PERCOCET) 7.5-325 MG per tablet, Take 1 tablet by mouth Every 6 (Six) Hours As Needed., Disp: , Rfl:   •  Repatha SureClick solution auto-injector SureClick injection, INJECT CONTENTS OF 1 SYRINGE UNDER THE SKIN EVERY 2 WEEKS, Disp: 2 mL, Rfl: 0  •  sulfamethoxazole-trimethoprim (BACTRIM DS,SEPTRA DS) 800-160 MG per tablet, Take 1 tablet by mouth Every 12 (Twelve) Hours., Disp: , Rfl:   •  tacrolimus (PROTOPIC) 0.1 % ointment, APPLY TOPICALLY TO THE AFFECTED AREA TWICE DAILY AS NEEDED, Disp: , Rfl:   •  traMADol (ULTRAM) 50 MG tablet, Take 1 tablet by mouth Every 12 (Twelve) Hours As Needed for Moderate Pain., Disp: 60 tablet, Rfl: 2  •  zolpidem CR (Ambien CR) 12.5 MG CR tablet, Take 1 tablet by mouth At Night As Needed for Sleep., Disp: 30 tablet, Rfl: 2    Allergies:   Allergies   Allergen Reactions   • Ibuprofen Other (See Comments)     Instructed not to take due to his kidneys         Physical Exam:  Vital Signs:   Vitals:    02/23/23 1002   BP: 110/80   BP Location: Left arm   Patient Position: Sitting   Cuff Size: Adult   Pulse: 63   Resp: 12   Temp: 97.8 °F (36.6 °C)   TempSrc: Temporal   SpO2:  "99%   Weight: 92.3 kg (203 lb 8 oz)   Height: 182.9 cm (72\")   PainSc: 0-No pain     Body mass index is 27.6 kg/m².     Physical Exam  Vitals and nursing note reviewed.   Constitutional:       Appearance: Normal appearance. He is normal weight.   HENT:      Head: Normocephalic and atraumatic.      Right Ear: Tympanic membrane, ear canal and external ear normal.      Left Ear: Tympanic membrane, ear canal and external ear normal.      Nose: Nose normal.      Mouth/Throat:      Mouth: Mucous membranes are dry.      Pharynx: Oropharynx is clear.   Eyes:      Extraocular Movements: Extraocular movements intact.      Conjunctiva/sclera: Conjunctivae normal.      Pupils: Pupils are equal, round, and reactive to light.   Cardiovascular:      Rate and Rhythm: Normal rate and regular rhythm.      Pulses: Normal pulses.      Heart sounds: Normal heart sounds.   Pulmonary:      Effort: Pulmonary effort is normal.      Breath sounds: Normal breath sounds.   Musculoskeletal:      Cervical back: Normal range of motion and neck supple.   Feet:      Comments:      Neurological:      Mental Status: He is alert.         Procedures      Assessment/Plan:   Diagnoses and all orders for this visit:    1. Anemia, unspecified type (Primary)  Assessment & Plan:  Patient is anemic.  No signs or symptoms.  We will recheck in 3 months.  Patient does have a history of prostate cancer.    Orders:  -     Hemoglobin A1c; Future  -     Lipid Panel; Future  -     Comprehensive Metabolic Panel; Future  -     Ferritin; Future  -     Iron Profile; Future  -     Vitamin B12; Future  -     TSH Rfx On Abnormal To Free T4; Future  -     CBC & Differential; Future    2. Hyperlipidemia, mixed  Assessment & Plan:  HDL 44.  .  Patient did not start Zetia.  He will restart it and recheck in 3 months.    Orders:  -     Discontinue: zolpidem (AMBIEN) 10 MG tablet; Take 1 tablet by mouth every night at bedtime.  Dispense: 30 tablet; Refill: 2  -     " Hemoglobin A1c; Future  -     Lipid Panel; Future  -     Comprehensive Metabolic Panel; Future  -     Ferritin; Future  -     Iron Profile; Future  -     Vitamin B12; Future  -     TSH Rfx On Abnormal To Free T4; Future  -     CBC & Differential; Future  -     traMADol (ULTRAM) 50 MG tablet; Take 1 tablet by mouth Every 12 (Twelve) Hours As Needed for Moderate Pain.  Dispense: 60 tablet; Refill: 2    3. Acquired hypothyroidism  Assessment & Plan:  TSH is 2.880.  Recheck in 3 months.    Orders:  -     Discontinue: zolpidem (AMBIEN) 10 MG tablet; Take 1 tablet by mouth every night at bedtime.  Dispense: 30 tablet; Refill: 2  -     Hemoglobin A1c; Future  -     Lipid Panel; Future  -     Comprehensive Metabolic Panel; Future  -     Ferritin; Future  -     Iron Profile; Future  -     Vitamin B12; Future  -     TSH Rfx On Abnormal To Free T4; Future  -     CBC & Differential; Future  -     traMADol (ULTRAM) 50 MG tablet; Take 1 tablet by mouth Every 12 (Twelve) Hours As Needed for Moderate Pain.  Dispense: 60 tablet; Refill: 2    4. Chronic kidney disease, stage 3a (HCC)  Assessment & Plan:  GFR 57.Patient is instructed to not take any NSAIDs.  Medicines as directed.  Stay well-hydrated.      Orders:  -     Discontinue: zolpidem (AMBIEN) 10 MG tablet; Take 1 tablet by mouth every night at bedtime.  Dispense: 30 tablet; Refill: 2  -     Hemoglobin A1c; Future  -     Lipid Panel; Future  -     Comprehensive Metabolic Panel; Future  -     Ferritin; Future  -     Iron Profile; Future  -     Vitamin B12; Future  -     TSH Rfx On Abnormal To Free T4; Future  -     CBC & Differential; Future  -     traMADol (ULTRAM) 50 MG tablet; Take 1 tablet by mouth Every 12 (Twelve) Hours As Needed for Moderate Pain.  Dispense: 60 tablet; Refill: 2    5. Primary insomnia  Assessment & Plan:  Patient requesting long-acting Ambien.  We will fill out form told what enough insurance would pay for it.    Orders:  -     Discontinue: zolpidem  (AMBIEN) 10 MG tablet; Take 1 tablet by mouth every night at bedtime.  Dispense: 30 tablet; Refill: 2  -     Hemoglobin A1c; Future  -     Lipid Panel; Future  -     Comprehensive Metabolic Panel; Future  -     Ferritin; Future  -     Iron Profile; Future  -     Vitamin B12; Future  -     TSH Rfx On Abnormal To Free T4; Future  -     CBC & Differential; Future  -     zolpidem CR (Ambien CR) 12.5 MG CR tablet; Take 1 tablet by mouth At Night As Needed for Sleep.  Dispense: 30 tablet; Refill: 2  -     traMADol (ULTRAM) 50 MG tablet; Take 1 tablet by mouth Every 12 (Twelve) Hours As Needed for Moderate Pain.  Dispense: 60 tablet; Refill: 2    6. Chronic midline low back pain without sciatica  Assessment & Plan:  Continue tramadol.    Orders:  -     Discontinue: zolpidem (AMBIEN) 10 MG tablet; Take 1 tablet by mouth every night at bedtime.  Dispense: 30 tablet; Refill: 2  -     Hemoglobin A1c; Future  -     Lipid Panel; Future  -     Comprehensive Metabolic Panel; Future  -     Ferritin; Future  -     Iron Profile; Future  -     Vitamin B12; Future  -     TSH Rfx On Abnormal To Free T4; Future  -     CBC & Differential; Future  -     traMADol (ULTRAM) 50 MG tablet; Take 1 tablet by mouth Every 12 (Twelve) Hours As Needed for Moderate Pain.  Dispense: 60 tablet; Refill: 2    7. Hyperglycemia  Assessment & Plan:  A1c he is better 5.5.  Patient stopped all sugar intake.    Orders:  -     Discontinue: zolpidem (AMBIEN) 10 MG tablet; Take 1 tablet by mouth every night at bedtime.  Dispense: 30 tablet; Refill: 2  -     Hemoglobin A1c; Future  -     Lipid Panel; Future  -     Comprehensive Metabolic Panel; Future  -     Ferritin; Future  -     Iron Profile; Future  -     Vitamin B12; Future  -     TSH Rfx On Abnormal To Free T4; Future  -     CBC & Differential; Future  -     traMADol (ULTRAM) 50 MG tablet; Take 1 tablet by mouth Every 12 (Twelve) Hours As Needed for Moderate Pain.  Dispense: 60 tablet; Refill: 2    8.  Coronary artery disease involving native heart without angina pectoris, unspecified vessel or lesion type  Assessment & Plan:  Rest of risk factor modification.    Orders:  -     Discontinue: zolpidem (AMBIEN) 10 MG tablet; Take 1 tablet by mouth every night at bedtime.  Dispense: 30 tablet; Refill: 2  -     Hemoglobin A1c; Future  -     Lipid Panel; Future  -     Comprehensive Metabolic Panel; Future  -     Ferritin; Future  -     Iron Profile; Future  -     Vitamin B12; Future  -     TSH Rfx On Abnormal To Free T4; Future  -     CBC & Differential; Future  -     traMADol (ULTRAM) 50 MG tablet; Take 1 tablet by mouth Every 12 (Twelve) Hours As Needed for Moderate Pain.  Dispense: 60 tablet; Refill: 2    9. Cellulitis of chest wall  -     Discontinue: zolpidem (AMBIEN) 10 MG tablet; Take 1 tablet by mouth every night at bedtime.  Dispense: 30 tablet; Refill: 2  -     Hemoglobin A1c; Future  -     Lipid Panel; Future  -     Comprehensive Metabolic Panel; Future  -     Ferritin; Future  -     Iron Profile; Future  -     Vitamin B12; Future  -     TSH Rfx On Abnormal To Free T4; Future  -     CBC & Differential; Future  -     traMADol (ULTRAM) 50 MG tablet; Take 1 tablet by mouth Every 12 (Twelve) Hours As Needed for Moderate Pain.  Dispense: 60 tablet; Refill: 2    Other orders  -     ketoconazole (NIZORAL) 2 % shampoo; Apply  topically to the appropriate area as directed 1 (One) Time Per Week.  Dispense: 120 mL; Refill: 3  -     methocarbamol (ROBAXIN) 750 MG tablet; Take 1 tablet by mouth 2 (Two) Times a Day.  Dispense: 180 tablet; Refill: 1  -     fluticasone (FLONASE) 50 MCG/ACT nasal spray; 2 sprays into the nostril(s) as directed by provider Daily.  Dispense: 48 g; Refill: 1           Follow Up:   Return in about 3 months (around 5/23/2023) for Annual physical, Bloodwork 1 week prior to next appointment.    Mina Story MD  [facility]

## 2023-02-23 NOTE — TELEPHONE ENCOUNTER
Rx Refill Note    Requested Prescriptions     Pending Prescriptions Disp Refills   • cetirizine (zyrTEC) 10 MG tablet [Pharmacy Med Name: CETIRIZINE 10MG TABLETS] 90 tablet      Sig: TAKE 1 TABLET BY MOUTH EVERY DAY        Last office visit with prescribing clinician: 2/23/2023      Next office visit with prescribing clinician: 5/19/2023   Last labs:   Last refill:    Pharmacy [facility]

## 2023-02-23 NOTE — ASSESSMENT & PLAN NOTE
Patient is anemic.  No signs or symptoms.  We will recheck in 3 months.  Patient does have a history of prostate cancer.

## 2023-02-23 NOTE — ASSESSMENT & PLAN NOTE
Patient requesting long-acting Ambien.  We will fill out form told what enough insurance would pay for it.

## 2023-02-23 NOTE — ASSESSMENT & PLAN NOTE
GFR 57.Patient is instructed to not take any NSAIDs.  Medicines as directed.  Stay well-hydrated.

## 2023-03-09 NOTE — TELEPHONE ENCOUNTER
Rx Refill Note    Requested Prescriptions     Pending Prescriptions Disp Refills   • Repatha SureClick solution auto-injector SureClick injection [Pharmacy Med Name: REPATHA SRCLK 140MG/ML PF AUTO INJ] 2 mL 0     Sig: INJECT THE CONTENTS OF 1 SYRINGE UNDER THE SKIN EVERY 2 WEEKS        Last office visit with prescribing clinician: 2/23/2023      Next office visit with prescribing clinician: 5/19/2023   Last labs:   Last refill:    [facility]

## 2023-03-10 RX ORDER — EVOLOCUMAB 140 MG/ML
INJECTION, SOLUTION SUBCUTANEOUS
Qty: 2 ML | Refills: 5 | Status: SHIPPED | OUTPATIENT
Start: 2023-03-10

## 2023-03-18 DIAGNOSIS — E03.9 HYPOTHYROIDISM, UNSPECIFIED TYPE: ICD-10-CM

## 2023-03-20 RX ORDER — LIOTHYRONINE SODIUM 5 UG/1
TABLET ORAL
Qty: 180 TABLET | Refills: 0 | Status: SHIPPED | OUTPATIENT
Start: 2023-03-20

## 2023-03-20 NOTE — TELEPHONE ENCOUNTER
Rx Refill Note    Requested Prescriptions     Pending Prescriptions Disp Refills   • liothyronine (CYTOMEL) 5 MCG tablet [Pharmacy Med Name: LIOTHYRONINE 5MCG TABLETS] 180 tablet 0     Sig: TAKE 1 TABLET BY MOUTH TWICE DAILY        Last office visit with prescribing clinician: 2/23/2023      Next office visit with prescribing clinician: 5/19/2023   Last labs:   Last refill:    Pharmacy [facility]            '

## 2023-03-23 DIAGNOSIS — K59.00 CONSTIPATION, UNSPECIFIED CONSTIPATION TYPE: ICD-10-CM

## 2023-03-24 RX ORDER — LUBIPROSTONE 24 UG/1
CAPSULE ORAL
Qty: 180 CAPSULE | Refills: 1 | Status: SHIPPED | OUTPATIENT
Start: 2023-03-24

## 2023-03-24 NOTE — TELEPHONE ENCOUNTER
Rx Refill Note    Requested Prescriptions     Pending Prescriptions Disp Refills   • lubiprostone (AMITIZA) 24 MCG capsule [Pharmacy Med Name: LUBIPROSTONE 24MCG CAPSULES] 180 capsule 3     Sig: TAKE ONE CAPSULE BY MOUTH TWICE DAILY WITH FOOD OR WATER        Last office visit with prescribing clinician: 2/23/2023      Next office visit with prescribing clinician: 5/19/2023   Last labs:   Last refill:    Pharmacy [facility]

## 2023-04-06 DIAGNOSIS — F51.01 PRIMARY INSOMNIA: ICD-10-CM

## 2023-04-06 RX ORDER — ZOLPIDEM TARTRATE 12.5 MG/1
12.5 TABLET, FILM COATED, EXTENDED RELEASE ORAL NIGHTLY PRN
Qty: 30 TABLET | Refills: 2 | OUTPATIENT
Start: 2023-04-06

## 2023-04-06 NOTE — TELEPHONE ENCOUNTER
Caller: Kavon Waters Jr.    Relationship: Self    Best call back number: [phone]    Requested Prescriptions:   Requested Prescriptions     Pending Prescriptions Disp Refills   • zolpidem CR (Ambien CR) 12.5 MG CR tablet 30 tablet 2     Sig: Take 1 tablet by mouth At Night As Needed for Sleep.        Pharmacy where request should be sent: [id number] [address] AT [facility] & [address] [facility] [phone] [facility] [phone] FX     Last office visit with prescribing clinician: 2/23/2023   Last telemedicine visit with prescribing clinician: 5/12/2023   Next office visit with prescribing clinician: 5/19/2023     Additional details provided by patient: PATIENT WILL BE OUT IN 2 TO 3 DAYS.  PATIENT WANTED TO MAKE SURE IS WAS THE TIME RELEASE 12 MG PILL.    Does the patient have less than a 3 day supply:  [x] Yes  [] No    Would you like a call back once the refill request has been completed: [] Yes [] No    If the office needs to give you a call back, can they leave a voicemail: [] Yes [] No    Salma Austin Rep   04/06/23 09:10 EDT

## 2023-04-28 ENCOUNTER — TELEPHONE (OUTPATIENT)
Dept: FAMILY MEDICINE CLINIC | Facility: CLINIC | Age: 73
End: 2023-04-28

## 2023-04-28 NOTE — TELEPHONE ENCOUNTER
Caller: Kavon Waters Jr.    Relationship: Self    Best call back number: [phone]    What is the best time to reach you: ANYTIME    Who are you requesting to speak with (clinical staff, provider,  specific staff member): CLINICAL - DR ELLIS      What was the call regarding: PATIENT WAS SEEN ON 4/20/23 AT [facility] BY ALFRED NUNEZ FOR BLADDER LEAKAGE/ISSUES/ACHING/BURNING. HE WAS GIVEN CIPRO BUT TEST CAME BACK NEGATIVE FOR BACTERIA.   HE'S STILL HAVING SAME ISSUES. HE ONLY WANTS TO BE SEEN BY DR ELLIS FOR THIS ISSUE.   IS THERE ANYWAY TO WORK HIM IN EARLY NEXT WEEK, PREFERABLY ON Monday?  PLEASE ADVISE HIM  THANK YOU.    Do you require a callback: YES

## 2023-05-01 ENCOUNTER — OFFICE VISIT (OUTPATIENT)
Dept: FAMILY MEDICINE CLINIC | Facility: CLINIC | Age: 73
End: 2023-05-01
Payer: MEDICARE

## 2023-05-01 VITALS
WEIGHT: 205.3 LBS | SYSTOLIC BLOOD PRESSURE: 120 MMHG | RESPIRATION RATE: 14 BRPM | HEIGHT: 72 IN | HEART RATE: 65 BPM | OXYGEN SATURATION: 98 % | DIASTOLIC BLOOD PRESSURE: 80 MMHG | BODY MASS INDEX: 27.81 KG/M2 | TEMPERATURE: 98.4 F

## 2023-05-01 DIAGNOSIS — B37.89 CANDIDIASIS OF ANUS: ICD-10-CM

## 2023-05-01 DIAGNOSIS — N41.9 PROSTATITIS, UNSPECIFIED PROSTATITIS TYPE: Primary | ICD-10-CM

## 2023-05-01 LAB
BILIRUB BLD-MCNC: NEGATIVE MG/DL
CLARITY, POC: CLEAR
COLOR UR: YELLOW
EXPIRATION DATE: NORMAL
GLUCOSE UR STRIP-MCNC: NEGATIVE MG/DL
KETONES UR QL: NEGATIVE
LEUKOCYTE EST, POC: NEGATIVE
Lab: NORMAL
NITRITE UR-MCNC: NEGATIVE MG/ML
PH UR: 5.5 [PH] (ref 5–8)
PROT UR STRIP-MCNC: NEGATIVE MG/DL
RBC # UR STRIP: NEGATIVE /UL
SP GR UR: 1 (ref 1–1.03)
UROBILINOGEN UR QL: NORMAL

## 2023-05-01 PROCEDURE — 3074F SYST BP LT 130 MM HG: CPT | Performed by: FAMILY MEDICINE

## 2023-05-01 PROCEDURE — 81003 URINALYSIS AUTO W/O SCOPE: CPT | Performed by: FAMILY MEDICINE

## 2023-05-01 PROCEDURE — 3079F DIAST BP 80-89 MM HG: CPT | Performed by: FAMILY MEDICINE

## 2023-05-01 PROCEDURE — 99214 OFFICE O/P EST MOD 30 MIN: CPT | Performed by: FAMILY MEDICINE

## 2023-05-01 RX ORDER — METHOCARBAMOL 750 MG/1
TABLET, FILM COATED ORAL
COMMUNITY
Start: 2023-02-23 | End: 2023-05-01 | Stop reason: SDUPTHER

## 2023-05-01 RX ORDER — FLUTICASONE PROPIONATE 50 MCG
SPRAY, SUSPENSION (ML) NASAL
COMMUNITY
Start: 2023-02-23 | End: 2023-05-01 | Stop reason: SDUPTHER

## 2023-05-01 RX ORDER — CIPROFLOXACIN 250 MG/1
250 TABLET, FILM COATED ORAL 2 TIMES DAILY
Qty: 42 TABLET | Refills: 0 | Status: SHIPPED | OUTPATIENT
Start: 2023-05-01

## 2023-05-01 RX ORDER — CIPROFLOXACIN 500 MG/1
1 TABLET, FILM COATED ORAL EVERY 12 HOURS SCHEDULED
COMMUNITY
Start: 2023-04-20 | End: 2023-05-01

## 2023-05-01 NOTE — PROGRESS NOTES
Patient Name: Kavon Waters Jr.  : 1950   MRN: [id number]     Chief Complaint:    Chief Complaint   Patient presents with   • Urinary Tract Infection     Pt went to Urgent care and they put him on ciprofloxiacine 500       History of Present Illness: Kavon Waters Jr. is a 72 y.o. male who is here today for follow up dysuria  HPI        Review of Systems:   Review of Systems   Constitutional: Negative.    HENT: Negative.    Eyes: Negative.    Respiratory: Negative.    Cardiovascular: Negative.    Gastrointestinal: Negative.    Genitourinary: Positive for dysuria.   Neurological: Negative.         Past Medical History:   Past Medical History:   Diagnosis Date   • Acquired hypothyroidism    • Allergies    • Arrhythmia    • Arthritis    • Atrial fibrillation    • AV block, 1st degree    • Borderline diabetes    • BPH associated with nocturia    • Chronic idiopathic constipation    • CKD (chronic kidney disease), stage III    • Constipation    • Coronary artery disease involving native coronary artery of native heart without angina pectoris    • Depression    • Disease of thyroid gland    • Eczema    • Essential hypertension    • Full dentures    • GERD (gastroesophageal reflux disease)    • H/O heart artery stent     X 7   • High risk medication use    • Ischemic cardiomyopathy    • Long term (current) use of anticoagulants    • Low sodium levels    • Lower back pain    • Mixed hyperlipidemia    • PAUL (obstructive sleep apnea)     NO CPAP USE   • Paroxysmal A-fib    • Primary osteoarthritis involving multiple joints    • Prostate cancer     HAD 43 RADIATION TREATMENTS.   • Prostatic hypertrophy     Benign   • RBBB    • Recurrent major depressive disorder, in full remission    • Seasonal allergies    • Skin cancer    • Stroke     AFFECTED RIGHT ARM/LEG.  PT DENIES RESIDUAL DEFICITS.   • Thyroid disease    • Wears glasses        Past Surgical History:   Past Surgical History:   Procedure Laterality Date    • CARDIAC CATHETERIZATION     • CARDIAC CATHETERIZATION N/A 2018    Procedure: Left Heart Cath;  Surgeon: Moshe Ahn MD;  Location:  [facility];  Service: Cardiovascular   • CHOLECYSTECTOMY     • CIRCUMCISION     • COLONOSCOPY  2013   • CORONARY ARTERY BYPASS GRAFT N/A 2018    Procedure: MEDIASTERNOTOMY CORONARY ARTERY BYPASS GRAFT X2 UTILIZING LEFT INTERNAL MAMMARY ARTERY, EVH LEFT GREATER SAPHENOUS VEIN;  Surgeon: Rakesh Mittal MD;  Location:  [facility];  Service: Cardiothoracic   • CORONARY STENT PLACEMENT     • SKIN CANCER EXCISION         Family History:   Family History   Problem Relation Age of Onset   • Kidney failure Mother         COMPLICATIONS OF DIALYSIS       Social History:   Social History     Socioeconomic History   • Marital status:      Spouse name: Mara   • Number of children: 2   • Highest education level: 9th grade   Tobacco Use   • Smoking status: Former     Packs/day: 1.00     Years: 23.00     Pack years: 23.00     Types: Cigarettes     Start date:      Quit date: 1988     Years since quittin.1   • Smokeless tobacco: Never   • Tobacco comments:     QUIT 30 YEARS AGO   Vaping Use   • Vaping Use: Never used   Substance and Sexual Activity   • Alcohol use: No   • Drug use: No   • Sexual activity: Defer       Medications:     Current Outpatient Medications:   •  aspirin 81 MG chewable tablet, Chew 1 tablet Daily., Disp: , Rfl:   •  bicalutamide (CASODEX) 50 MG chemo tablet, Take 1 tablet by mouth Daily., Disp: , Rfl:   •  cetirizine (zyrTEC) 10 MG tablet, TAKE 1 TABLET BY MOUTH EVERY DAY, Disp: 90 tablet, Rfl: 1  •  clopidogrel (PLAVIX) 75 MG tablet, TAKE 1 TABLET BY MOUTH EVERY DAY, Disp: 90 tablet, Rfl: 0  •  ezetimibe (Zetia) 10 MG tablet, Take 1 tablet by mouth Daily., Disp: 30 tablet, Rfl: 5  •  fluconazole (DIFLUCAN) 150 MG tablet, Take 1 tablet by mouth 1 (One) Time Per Week., Disp: , Rfl:   •  fluticasone  (FLONASE) 50 MCG/ACT nasal spray, 2 sprays into the nostril(s) as directed by provider Daily., Disp: 48 g, Rfl: 1  •  ketoconazole (NIZORAL) 2 % shampoo, Apply  topically to the appropriate area as directed 1 (One) Time Per Week., Disp: 120 mL, Rfl: 3  •  levothyroxine (SYNTHROID, LEVOTHROID) 50 MCG tablet, Take 1 tablet by mouth Daily., Disp: 90 tablet, Rfl: 1  •  liothyronine (CYTOMEL) 5 MCG tablet, TAKE 1 TABLET BY MOUTH TWICE DAILY, Disp: 180 tablet, Rfl: 0  •  losartan (COZAAR) 100 MG tablet, Take 1 tablet by mouth Daily., Disp: 90 tablet, Rfl: 3  •  lubiprostone (AMITIZA) 24 MCG capsule, TAKE ONE CAPSULE BY MOUTH TWICE DAILY WITH FOOD OR WATER, Disp: 180 capsule, Rfl: 1  •  methocarbamol (ROBAXIN) 750 MG tablet, Take 1 tablet by mouth 2 (Two) Times a Day., Disp: 180 tablet, Rfl: 1  •  omeprazole (priLOSEC) 40 MG capsule, TAKE 1 CAPSULE BY MOUTH TWICE DAILY BEFORE A MEAL, Disp: 180 capsule, Rfl: 1  •  oxyCODONE-acetaminophen (PERCOCET) 7.5-325 MG per tablet, Take 1 tablet by mouth Every 6 (Six) Hours As Needed., Disp: , Rfl:   •  Repatha SureClick solution auto-injector SureClick injection, INJECT THE CONTENTS OF 1 SYRINGE UNDER THE SKIN EVERY 2 WEEKS, Disp: 2 mL, Rfl: 5  •  sulfamethoxazole-trimethoprim (BACTRIM DS,SEPTRA DS) 800-160 MG per tablet, Take 1 tablet by mouth Every 12 (Twelve) Hours., Disp: , Rfl:   •  tacrolimus (PROTOPIC) 0.1 % ointment, APPLY TOPICALLY TO THE AFFECTED AREA TWICE DAILY AS NEEDED, Disp: , Rfl:   •  traMADol (ULTRAM) 50 MG tablet, Take 1 tablet by mouth Every 12 (Twelve) Hours As Needed for Moderate Pain., Disp: 60 tablet, Rfl: 2  •  zolpidem CR (Ambien CR) 12.5 MG CR tablet, Take 1 tablet by mouth At Night As Needed for Sleep., Disp: 30 tablet, Rfl: 2  •  ciprofloxacin (Cipro) 250 MG tablet, Take 1 tablet by mouth 2 (Two) Times a Day., Disp: 42 tablet, Rfl: 0  •  nystatin (MYCOSTATIN) 100,000 unit/mL suspension, Swish and swallow 5 mL 4 (Four) Times a Day., Disp: 60 mL, Rfl:  "2    Allergies:   Allergies   Allergen Reactions   • Ibuprofen Other (See Comments)     Instructed not to take due to his kidneys         Physical Exam:  Vital Signs:   Vitals:    05/01/23 1510   BP: 120/80   BP Location: Left arm   Patient Position: Sitting   Cuff Size: Adult   Pulse: 65   Resp: 14   Temp: 98.4 °F (36.9 °C)   TempSrc: Temporal   SpO2: 98%   Weight: 93.1 kg (205 lb 4.8 oz)   Height: 182.9 cm (72\")   PainSc: 0-No pain     Body mass index is 27.84 kg/m².     Physical Exam  Vitals and nursing note reviewed.   Constitutional:       Appearance: Normal appearance. He is normal weight.   HENT:      Head: Normocephalic and atraumatic.      Right Ear: Tympanic membrane, ear canal and external ear normal.      Left Ear: Tympanic membrane, ear canal and external ear normal.      Nose: Nose normal.      Mouth/Throat:      Mouth: Mucous membranes are moist.      Pharynx: Oropharynx is clear.   Eyes:      Extraocular Movements: Extraocular movements intact.      Conjunctiva/sclera: Conjunctivae normal.      Pupils: Pupils are equal, round, and reactive to light.   Cardiovascular:      Rate and Rhythm: Normal rate and regular rhythm.      Pulses: Normal pulses.      Heart sounds: Normal heart sounds.   Pulmonary:      Effort: Pulmonary effort is normal.      Breath sounds: Normal breath sounds.   Genitourinary:     Comments: Perianal dermatitis  Musculoskeletal:      Cervical back: Normal range of motion and neck supple.   Feet:      Comments:      Skin:     General: Skin is warm and dry.   Neurological:      General: No focal deficit present.      Mental Status: He is alert and oriented to person, place, and time.   Psychiatric:         Mood and Affect: Mood normal.         Behavior: Behavior normal.         Thought Content: Thought content normal.         Judgment: Judgment normal.         Procedures      Assessment/Plan:   Diagnoses and all orders for this visit:    1. Prostatitis, unspecified prostatitis type " (Primary)  Assessment & Plan:  I am going to give him ciprofloxacin for 6 weeks.  Return to clinic at that time and see if he is better.    Orders:  -     POCT urinalysis dipstick, automated  -     Urine Culture - Urine, Urine, Clean Catch; Future    2. Candidiasis of anus  Assessment & Plan:  Patient appears to have a yeast candidiasis around his rectum and in his gluteal fold.  I am going to have him put some nystatin cream on that.  Return to clinic in 6 weeks for recheck.    Orders:  -     POCT urinalysis dipstick, automated  -     Urine Culture - Urine, Urine, Clean Catch; Future    Other orders  -     nystatin (MYCOSTATIN) 100,000 unit/mL suspension; Swish and swallow 5 mL 4 (Four) Times a Day.  Dispense: 60 mL; Refill: 2  -     ciprofloxacin (Cipro) 250 MG tablet; Take 1 tablet by mouth 2 (Two) Times a Day.  Dispense: 42 tablet; Refill: 0           Follow Up:   No follow-ups on file.    Mina Story MD  [facility]

## 2023-05-01 NOTE — ASSESSMENT & PLAN NOTE
I am going to give him ciprofloxacin for 6 weeks.  Return to clinic at that time and see if he is better.

## 2023-05-01 NOTE — ASSESSMENT & PLAN NOTE
Patient appears to have a yeast candidiasis around his rectum and in his gluteal fold.  I am going to have him put some nystatin cream on that.  Return to clinic in 6 weeks for recheck.

## 2023-05-04 LAB
BACTERIA UR CULT: NORMAL
BACTERIA UR CULT: NORMAL

## 2023-05-10 NOTE — TELEPHONE ENCOUNTER
Caller: Kavon Waters Jr.    Relationship: Self    Best call back number: [phone]    Requested Prescriptions:   Requested Prescriptions     Pending Prescriptions Disp Refills   • nystatin (MYCOSTATIN) 100,000 unit/mL suspension 60 mL 2     Sig: Swish and swallow 5 mL 4 (Four) Times a Day.        Pharmacy where request should be sent: [facility] #[id number] [address] AT [facility] & [address] [facility] [phone] [facility] [phone] FX     Last office visit with prescribing clinician: 5/1/2023   Last telemedicine visit with prescribing clinician: 5/9/2023   Next office visit with prescribing clinician: 6/26/2023     Additional details provided by patient: WILL BE OUT AFTER TODAY. SAYS BOTTLES ARE VERY SMALL AND RUNS OUT IN A FEW DAYS. PLEASE CALL PATIENT ONCE YOU SEND IT IN TO PHARMACY.     Does the patient have less than a 3 day supply:  [x] Yes  [] No    Would you like a call back once the refill request has been completed: [x] Yes [] No    If the office needs to give you a call back, can they leave a voicemail: [x] Yes [] No    Salma Almonte Rep   05/10/23 14:07 EDT

## 2023-05-11 NOTE — TELEPHONE ENCOUNTER
Caller: Jt Kavon Jr.    Relationship: Self    Best call back number:[phone]     Requested Prescriptions:   Requested Prescriptions     Pending Prescriptions Disp Refills   • nystatin (MYCOSTATIN) 100,000 unit/mL suspension [Pharmacy Med Name: NYSTATIN ORAL SUSP [id number]/ML] 60 mL 2     Sig: SWISH AND SWALLOW 5 ML BY MOUTH FOUR TIMES DAILY        Pharmacy where request should be sent: [facility] #[id number] [address] AT [facility] & [address] [facility] [phone] [facility] [phone]      Last office visit with prescribing clinician: 5/1/2023   Last telemedicine visit with prescribing clinician: 5/10/2023   Next office visit with prescribing clinician: 6/26/2023     Additional details provided by patient: COMPLETELY OUT AND WOULD LIKE TO HAVE TODAY PLEASE    Does the patient have less than a 3 day supply:  [x] Yes  [] No    Would you like a call back once the refill request has been completed: [x] Yes [] No    If the office needs to give you a call back, can they leave a voicemail: [x] Yes [] No    Salma Lloyd Rep   05/11/23 12:21 EDT

## 2023-05-17 ENCOUNTER — TELEPHONE (OUTPATIENT)
Dept: FAMILY MEDICINE CLINIC | Facility: CLINIC | Age: 73
End: 2023-05-17
Payer: MEDICARE

## 2023-05-17 RX ORDER — KETOCONAZOLE 20 MG/G
1 CREAM TOPICAL DAILY
Qty: 60 G | Refills: 0 | Status: SHIPPED | OUTPATIENT
Start: 2023-05-17

## 2023-05-17 NOTE — TELEPHONE ENCOUNTER
Caller: Kavon Waters Jr.    Relationship: Self    Best call back number: [phone]    What medication are you requesting:   LAMISIL     What are your current symptoms:   YEAST INFECTION     How long have you been experiencing symptoms:   6 MONTHS     Have you had these symptoms before:    [x] Yes  [] No    Have you been treated for these symptoms before:   [x] Yes  [] No    If a prescription is needed, what is your preferred pharmacy and phone number:    [id number] [address] AT [facility] & [address] [facility] - [phone] [facility] [phone]   [phone]    Additional notes:  PATIENT WOULD LIKE FOR MEDICATION TO BE CALLED INTO THE PHARMACY TO HELP WITH A ONGOING YEAST INFECTION

## 2023-05-25 DIAGNOSIS — I25.10 CORONARY ARTERY DISEASE INVOLVING NATIVE HEART WITHOUT ANGINA PECTORIS, UNSPECIFIED VESSEL OR LESION TYPE: ICD-10-CM

## 2023-05-25 DIAGNOSIS — N18.31 CHRONIC KIDNEY DISEASE, STAGE 3A: ICD-10-CM

## 2023-05-25 DIAGNOSIS — L03.313 CELLULITIS OF CHEST WALL: ICD-10-CM

## 2023-05-25 DIAGNOSIS — F51.01 PRIMARY INSOMNIA: ICD-10-CM

## 2023-05-25 DIAGNOSIS — E78.2 HYPERLIPIDEMIA, MIXED: ICD-10-CM

## 2023-05-25 DIAGNOSIS — R73.9 HYPERGLYCEMIA: ICD-10-CM

## 2023-05-25 DIAGNOSIS — E03.9 ACQUIRED HYPOTHYROIDISM: ICD-10-CM

## 2023-05-25 DIAGNOSIS — M54.50 CHRONIC MIDLINE LOW BACK PAIN WITHOUT SCIATICA: ICD-10-CM

## 2023-05-25 DIAGNOSIS — G89.29 CHRONIC MIDLINE LOW BACK PAIN WITHOUT SCIATICA: ICD-10-CM

## 2023-05-25 RX ORDER — TRAMADOL HYDROCHLORIDE 50 MG/1
50 TABLET ORAL EVERY 12 HOURS PRN
Qty: 60 TABLET | Refills: 0 | Status: SHIPPED | OUTPATIENT
Start: 2023-05-25

## 2023-05-25 RX ORDER — ZOLPIDEM TARTRATE 12.5 MG/1
12.5 TABLET, FILM COATED, EXTENDED RELEASE ORAL NIGHTLY PRN
Qty: 30 TABLET | Refills: 0 | Status: SHIPPED | OUTPATIENT
Start: 2023-05-25

## 2023-05-25 NOTE — TELEPHONE ENCOUNTER
Rx Refill Note    Requested Prescriptions     Pending Prescriptions Disp Refills   • zolpidem CR (Ambien CR) 12.5 MG CR tablet 30 tablet 0     Sig: Take 1 tablet by mouth At Night As Needed for Sleep.   • traMADol (ULTRAM) 50 MG tablet 60 tablet 0     Sig: Take 1 tablet by mouth Every 12 (Twelve) Hours As Needed for Moderate Pain.        Last office visit with prescribing clinician: 5/1/2023      Next office visit with prescribing clinician: 6/26/2023   Last labs:   Last refill: 02/23/2023   Pharmacy (be sure to add in Epic). correct

## 2023-05-25 NOTE — TELEPHONE ENCOUNTER
Caller: WatersKavon chua Jr.    Relationship: Self    Best call back number: [phone]  Requested Prescriptions:   Requested Prescriptions     Pending Prescriptions Disp Refills   • zolpidem CR (Ambien CR) 12.5 MG CR tablet 30 tablet 2     Sig: Take 1 tablet by mouth At Night As Needed for Sleep.   • traMADol (ULTRAM) 50 MG tablet 60 tablet 2     Sig: Take 1 tablet by mouth Every 12 (Twelve) Hours As Needed for Moderate Pain.        Pharmacy where request should be sent: [facility] #[id number] [address] AT [facility] & [address] [facility] - [phone] [facility] [phone]      Last office visit with prescribing clinician: 5/1/2023   Last telemedicine visit with prescribing clinician: Visit date not found   Next office visit with prescribing clinician: 6/26/2023     Additional details provided by patient: PLEASE REFILL   Does the patient have less than a 3 day supply:  [] Yes  [x] No    Would you like a call back once the refill request has been completed: [] Yes [x] No    If the office needs to give you a call back, can they leave a voicemail: [] Yes [x] No    Salma Arboleda Rep   05/25/23 11:46 EDT

## 2023-05-26 DIAGNOSIS — I73.9 PAD (PERIPHERAL ARTERY DISEASE): ICD-10-CM

## 2023-05-26 RX ORDER — CLOPIDOGREL BISULFATE 75 MG/1
TABLET ORAL
Qty: 90 TABLET | Refills: 0 | Status: SHIPPED | OUTPATIENT
Start: 2023-05-26

## 2023-05-26 RX ORDER — LOSARTAN POTASSIUM 100 MG/1
100 TABLET ORAL DAILY
Qty: 90 TABLET | Refills: 3 | Status: SHIPPED | OUTPATIENT
Start: 2023-05-26

## 2023-06-05 RX ORDER — LEVOTHYROXINE SODIUM 0.05 MG/1
50 TABLET ORAL DAILY
Qty: 90 TABLET | Refills: 1 | Status: SHIPPED | OUTPATIENT
Start: 2023-06-05

## 2023-06-05 NOTE — TELEPHONE ENCOUNTER
Rx Refill Note    Requested Prescriptions     Pending Prescriptions Disp Refills    levothyroxine (SYNTHROID, LEVOTHROID) 50 MCG tablet [Pharmacy Med Name: LEVOTHYROXINE 0.05MG (50MCG) TAB] 90 tablet 1     Sig: TAKE 1 TABLET BY MOUTH DAILY        Last office visit with prescribing clinician: 5/1/2023      Next office visit with prescribing clinician: 6/26/2023   Last labs:   Last refill:    Pharmacy [facility]

## 2023-06-07 ENCOUNTER — TELEPHONE (OUTPATIENT)
Dept: FAMILY MEDICINE CLINIC | Facility: CLINIC | Age: 73
End: 2023-06-07
Payer: MEDICARE

## 2023-06-07 RX ORDER — KETOCONAZOLE 20 MG/G
1 CREAM TOPICAL DAILY
Qty: 60 G | Refills: 0 | OUTPATIENT
Start: 2023-06-07

## 2023-06-19 ENCOUNTER — OFFICE VISIT (OUTPATIENT)
Dept: FAMILY MEDICINE CLINIC | Facility: CLINIC | Age: 73
End: 2023-06-19
Payer: MEDICARE

## 2023-06-19 VITALS
WEIGHT: 201 LBS | OXYGEN SATURATION: 98 % | DIASTOLIC BLOOD PRESSURE: 92 MMHG | HEART RATE: 78 BPM | BODY MASS INDEX: 27.22 KG/M2 | HEIGHT: 72 IN | SYSTOLIC BLOOD PRESSURE: 138 MMHG

## 2023-06-19 DIAGNOSIS — B37.2 CANDIDAL INTERTRIGO: Primary | ICD-10-CM

## 2023-06-19 PROCEDURE — 3080F DIAST BP >= 90 MM HG: CPT | Performed by: NURSE PRACTITIONER

## 2023-06-19 PROCEDURE — 3075F SYST BP GE 130 - 139MM HG: CPT | Performed by: NURSE PRACTITIONER

## 2023-06-19 PROCEDURE — 99213 OFFICE O/P EST LOW 20 MIN: CPT | Performed by: NURSE PRACTITIONER

## 2023-06-19 RX ORDER — NYSTATIN 100000 U/G
CREAM TOPICAL
COMMUNITY
Start: 2023-05-15

## 2023-06-19 RX ORDER — TACROLIMUS 1 MG/G
OINTMENT TOPICAL
Status: CANCELLED | OUTPATIENT
Start: 2023-06-19

## 2023-06-19 NOTE — PROGRESS NOTES
Office Note     Name: Kavon Waters Jr.    : 1950     MRN: [id number]     Chief Complaint  Rash    Subjective     History of Present Illness:  Kavon Waters Jr. is a 72 y.o. male who presents today for long standing history of yeast infection located in groin, penis and buttock areas. H/o radiation to pelvic area due to prostate cancer but no surgery. Couldn't tolerate lupron injections. Is currently on chemo and had bilateral orchidectomy.  Has tried various creams/ topicals without much success. Has seen 2 dermatologists. States is borderline diabetic; does not take any medications for this.     Review of Systems:   Review of Systemsas per HPI    Family History:   Family History   Problem Relation Age of Onset    Kidney failure Mother         COMPLICATIONS OF DIALYSIS       Social History:   Social History     Socioeconomic History    Marital status:      Spouse name: Mara    Number of children: 2    Highest education level: 9th grade   Tobacco Use    Smoking status: Former     Packs/day: 1.00     Years: 23.00     Pack years: 23.00     Types: Cigarettes     Start date:      Quit date: 1988     Years since quittin.2    Smokeless tobacco: Never    Tobacco comments:     QUIT 30 YEARS AGO   Vaping Use    Vaping Use: Never used   Substance and Sexual Activity    Alcohol use: No    Drug use: No    Sexual activity: Defer       Past Medical History:   Past Medical History:   Diagnosis Date    Acquired hypothyroidism     Allergies     Arrhythmia     Arthritis     Atrial fibrillation     AV block, 1st degree     Borderline diabetes     BPH associated with nocturia     Chronic idiopathic constipation     CKD (chronic kidney disease), stage III     Constipation     Coronary artery disease involving native coronary artery of native heart without angina pectoris     Depression     Disease of thyroid gland     Eczema     Essential hypertension     Full dentures     GERD (gastroesophageal reflux  disease)     H/O heart artery stent     X 7    High risk medication use     Ischemic cardiomyopathy     Long term (current) use of anticoagulants     Low sodium levels     Lower back pain     Mixed hyperlipidemia     PAUL (obstructive sleep apnea)     NO CPAP USE    Paroxysmal A-fib     Primary osteoarthritis involving multiple joints     Prostate cancer     HAD 43 RADIATION TREATMENTS.    Prostatic hypertrophy     Benign    RBBB     Recurrent major depressive disorder, in full remission     Seasonal allergies     Skin cancer     Stroke     AFFECTED RIGHT ARM/LEG.  PT DENIES RESIDUAL DEFICITS.    Thyroid disease     Wears glasses        Past Surgical History:   Past Surgical History:   Procedure Laterality Date    CARDIAC CATHETERIZATION      CARDIAC CATHETERIZATION N/A 03/30/2018    Procedure: Left Heart Cath;  Surgeon: Moshe Ahn MD;  Location:  [facility];  Service: Cardiovascular    CHOLECYSTECTOMY      CIRCUMCISION  2005    COLONOSCOPY  08/14/2013    CORONARY ARTERY BYPASS GRAFT N/A 04/05/2018    Procedure: MEDIASTERNOTOMY CORONARY ARTERY BYPASS GRAFT X2 UTILIZING LEFT INTERNAL MAMMARY ARTERY, EVH LEFT GREATER SAPHENOUS VEIN;  Surgeon: Rakesh Mittal MD;  Location:  [facility];  Service: Cardiothoracic    CORONARY STENT PLACEMENT      SKIN CANCER EXCISION         Immunizations:   Immunization History   Administered Date(s) Administered    COVID-19 (PFIZER) Purple Cap Monovalent 02/28/2021, 03/28/2021, 12/22/2021    FLUAD TRI 65YR+ 09/28/2017    Flu Vaccine Quad PF >36MO 11/10/2015, 10/19/2016, 10/12/2017, 09/18/2018, 11/20/2019    Fluad Quad 65+ 09/24/2020    Fluzone High Dose =>65 Years ([employer] ONLY) 11/14/2016    Fluzone High-Dose 65+yrs 10/19/2022    INFLUENZA SPLIT TRI 12/01/2010, 09/19/2012    Influenza TIV (IM) 11/08/2007, 11/12/2008    Influenza, Unspecified 09/24/2020    Pneumococcal Conjugate 13-Valent (PCV13) 01/09/2015    Pneumococcal Polysaccharide (PPSV23) 12/01/2010,  06/08/2016, 09/28/2017    Pneumococcal, Unspecified 10/01/2010    Tdap 08/12/2011        Medications:     Current Outpatient Medications:     aspirin 81 MG chewable tablet, Chew 1 tablet Daily., Disp: , Rfl:     bicalutamide (CASODEX) 50 MG chemo tablet, Take 1 tablet by mouth Daily., Disp: , Rfl:     cetirizine (zyrTEC) 10 MG tablet, TAKE 1 TABLET BY MOUTH EVERY DAY, Disp: 90 tablet, Rfl: 1    clopidogrel (PLAVIX) 75 MG tablet, TAKE 1 TABLET BY MOUTH EVERY DAY, Disp: 90 tablet, Rfl: 0    fluticasone (FLONASE) 50 MCG/ACT nasal spray, 2 sprays into the nostril(s) as directed by provider Daily., Disp: 48 g, Rfl: 1    ketoconazole (NIZORAL) 2 % cream, Apply 1 application topically to the appropriate area as directed Daily., Disp: 60 g, Rfl: 0    ketoconazole (NIZORAL) 2 % shampoo, Apply  topically to the appropriate area as directed 1 (One) Time Per Week., Disp: 120 mL, Rfl: 3    levothyroxine (SYNTHROID, LEVOTHROID) 50 MCG tablet, TAKE 1 TABLET BY MOUTH DAILY, Disp: 90 tablet, Rfl: 1    liothyronine (CYTOMEL) 5 MCG tablet, TAKE 1 TABLET BY MOUTH TWICE DAILY, Disp: 180 tablet, Rfl: 0    losartan (COZAAR) 100 MG tablet, TAKE 1 TABLET BY MOUTH DAILY, Disp: 90 tablet, Rfl: 3    lubiprostone (AMITIZA) 24 MCG capsule, TAKE ONE CAPSULE BY MOUTH TWICE DAILY WITH FOOD OR WATER, Disp: 180 capsule, Rfl: 1    methocarbamol (ROBAXIN) 750 MG tablet, Take 1 tablet by mouth 2 (Two) Times a Day., Disp: 180 tablet, Rfl: 1    nystatin (MYCOSTATIN) 100,000 unit/mL suspension, Swish and swallow 5 mL 4 (Four) Times a Day., Disp: 60 mL, Rfl: 2    Repatha SureClick solution auto-injector SureClick injection, INJECT THE CONTENTS OF 1 SYRINGE UNDER THE SKIN EVERY 2 WEEKS, Disp: 2 mL, Rfl: 5    tacrolimus (PROTOPIC) 0.1 % ointment, APPLY TOPICALLY TO THE AFFECTED AREA TWICE DAILY AS NEEDED, Disp: , Rfl:     traMADol (ULTRAM) 50 MG tablet, Take 1 tablet by mouth Every 12 (Twelve) Hours As Needed for Moderate Pain., Disp: 60 tablet, Rfl: 0     "zolpidem CR (Ambien CR) 12.5 MG CR tablet, Take 1 tablet by mouth At Night As Needed for Sleep., Disp: 30 tablet, Rfl: 0    ciprofloxacin (Cipro) 250 MG tablet, Take 1 tablet by mouth 2 (Two) Times a Day. (Patient not taking: Reported on 6/19/2023), Disp: 42 tablet, Rfl: 0    ezetimibe (Zetia) 10 MG tablet, Take 1 tablet by mouth Daily. (Patient not taking: Reported on 6/19/2023), Disp: 30 tablet, Rfl: 5    fluconazole (DIFLUCAN) 150 MG tablet, Take 1 tablet by mouth 1 (One) Time Per Week. (Patient not taking: Reported on 6/19/2023), Disp: , Rfl:     nystatin (MYCOSTATIN) 222487 UNIT/GM cream, , Disp: , Rfl:     omeprazole (priLOSEC) 40 MG capsule, TAKE 1 CAPSULE BY MOUTH TWICE DAILY BEFORE A MEAL (Patient not taking: Reported on 6/19/2023), Disp: 180 capsule, Rfl: 1    oxyCODONE-acetaminophen (PERCOCET) 7.5-325 MG per tablet, Take 1 tablet by mouth Every 6 (Six) Hours As Needed. (Patient not taking: Reported on 6/19/2023), Disp: , Rfl:     sulfamethoxazole-trimethoprim (BACTRIM DS,SEPTRA DS) 800-160 MG per tablet, Take 1 tablet by mouth Every 12 (Twelve) Hours. (Patient not taking: Reported on 6/19/2023), Disp: , Rfl:     Allergies:   Allergies   Allergen Reactions    Ibuprofen Other (See Comments)     Instructed not to take due to his kidneys       Objective     Vital Signs  /92 (BP Location: Right arm, Patient Position: Sitting, Cuff Size: Adult)   Pulse 78   Ht 182.9 cm (72.01\")   Wt 91.2 kg (201 lb)   SpO2 98%   BMI 27.25 kg/m²   Estimated body mass index is 27.25 kg/m² as calculated from the following:    Height as of this encounter: 182.9 cm (72.01\").    Weight as of this encounter: 91.2 kg (201 lb).          Physical Exam  Vitals and nursing note reviewed.   Constitutional:       General: He is not in acute distress.     Appearance: Normal appearance. He is not ill-appearing.   HENT:      Head: Normocephalic and atraumatic.   Cardiovascular:      Rate and Rhythm: Normal rate.   Pulmonary:      " Effort: Pulmonary effort is normal.   Skin:     Findings: Rash present.      Comments: Mildly erythematous papular rash to posterior buttock folds and midline; also to bilateral inguinal areas and circumferentially on penis just proximal to head; no odor or discharge   Neurological:      Mental Status: He is alert and oriented to person, place, and time.   Psychiatric:         Mood and Affect: Mood normal.         Behavior: Behavior normal.        Procedures    Assessment and Plan     1. Candidal intertrigo  Advised patient to apply OTC Zeasorb powder to affected areas twice daily, then apply Calmoseptine ointment to affected areas twice daily after powder. Instructed to dry areas well after showering and wear cotton underwear. RTC in 1 week for recheck. Patient stated understanding and agreed with POC.        Follow Up  Return in about 1 week (around 6/26/2023) for Recheck, Next scheduled follow up.    ANTIONETTE Gee [facility]  [address] [facility] [id number] [phone]

## 2023-07-27 DIAGNOSIS — E03.9 HYPOTHYROIDISM, UNSPECIFIED TYPE: ICD-10-CM

## 2023-07-28 RX ORDER — LIOTHYRONINE SODIUM 5 UG/1
TABLET ORAL
Qty: 180 TABLET | Refills: 0 | Status: SHIPPED | OUTPATIENT
Start: 2023-07-28

## 2023-07-28 NOTE — TELEPHONE ENCOUNTER
Rx Refill Note    Requested Prescriptions     Pending Prescriptions Disp Refills    liothyronine (CYTOMEL) 5 MCG tablet [Pharmacy Med Name: LIOTHYRONINE 5MCG TABLETS] 180 tablet 0     Sig: TAKE 1 TABLET BY MOUTH TWICE DAILY        Last office visit with prescribing clinician: 6/26/2023      Next office visit with prescribing clinician: 9/26/2023   Last labs: 6/19/2023  Last refill: 3/20/2023   Pharmacy (be sure to add in Epic). yes

## 2023-08-24 DIAGNOSIS — I73.9 PAD (PERIPHERAL ARTERY DISEASE): ICD-10-CM

## 2023-08-25 RX ORDER — CLOPIDOGREL BISULFATE 75 MG/1
TABLET ORAL
Qty: 90 TABLET | Refills: 0 | Status: SHIPPED | OUTPATIENT
Start: 2023-08-25

## 2023-08-25 NOTE — TELEPHONE ENCOUNTER
Rx Refill Note    Requested Prescriptions     Pending Prescriptions Disp Refills    clopidogrel (PLAVIX) 75 MG tablet [Pharmacy Med Name: CLOPIDOGREL 75MG TABLETS] 90 tablet 0     Sig: TAKE 1 TABLET BY MOUTH EVERY DAY        Last office visit with prescribing clinician: 6/26/2023      Next office visit with prescribing clinician: 8/25/2023   Last labs:   Last refill: 05/26/2023   Pharmacy (be sure to add in Epic). correct

## 2023-08-26 NOTE — TELEPHONE ENCOUNTER
Rx Refill Note    Requested Prescriptions     Pending Prescriptions Disp Refills    cetirizine (zyrTEC) 10 MG tablet [Pharmacy Med Name: CETIRIZINE 10MG TABLETS] 90 tablet 1     Sig: TAKE 1 TABLET BY MOUTH EVERY DAY        Last office visit with prescribing clinician: 6/26/2023      Next office visit with prescribing clinician: 9/26/2023   Last labs:   Last refill: 02/23/2023   Pharmacy (be sure to add in Epic). correct

## 2023-08-28 RX ORDER — CETIRIZINE HYDROCHLORIDE 10 MG/1
TABLET ORAL
Qty: 90 TABLET | Refills: 1 | Status: SHIPPED | OUTPATIENT
Start: 2023-08-28

## 2023-09-14 ENCOUNTER — TELEPHONE (OUTPATIENT)
Dept: FAMILY MEDICINE CLINIC | Facility: CLINIC | Age: 73
End: 2023-09-14
Payer: MEDICARE

## 2023-09-14 DIAGNOSIS — M25.559 HIP PAIN, UNSPECIFIED LATERALITY: Primary | ICD-10-CM

## 2023-09-14 NOTE — TELEPHONE ENCOUNTER
Caller: Kavon Waters Jr.    Relationship: Self    Best call back number: [phone]     What is the medical concern/diagnosis: BURNING IN HIPS    What specialty or service is being requested: NEUROLOGY    What is the provider, practice or medical service name: [facility]    What is the office location: [facility]    Any additional details: PLEASE FAX REFERRAL [phone]

## 2023-09-26 ENCOUNTER — OFFICE VISIT (OUTPATIENT)
Dept: FAMILY MEDICINE CLINIC | Facility: CLINIC | Age: 73
End: 2023-09-26
Payer: MEDICARE

## 2023-09-26 VITALS
OXYGEN SATURATION: 98 % | HEIGHT: 72 IN | BODY MASS INDEX: 26.82 KG/M2 | RESPIRATION RATE: 16 BRPM | DIASTOLIC BLOOD PRESSURE: 82 MMHG | SYSTOLIC BLOOD PRESSURE: 138 MMHG | HEART RATE: 68 BPM | WEIGHT: 198 LBS

## 2023-09-26 DIAGNOSIS — F51.01 PRIMARY INSOMNIA: ICD-10-CM

## 2023-09-26 DIAGNOSIS — E78.2 HYPERLIPIDEMIA, MIXED: ICD-10-CM

## 2023-09-26 DIAGNOSIS — G89.29 CHRONIC MIDLINE LOW BACK PAIN WITHOUT SCIATICA: ICD-10-CM

## 2023-09-26 DIAGNOSIS — N18.31 CHRONIC KIDNEY DISEASE, STAGE 3A: ICD-10-CM

## 2023-09-26 DIAGNOSIS — E03.9 ACQUIRED HYPOTHYROIDISM: ICD-10-CM

## 2023-09-26 DIAGNOSIS — Z79.899 ENCOUNTER FOR LONG-TERM (CURRENT) USE OF OTHER MEDICATIONS: ICD-10-CM

## 2023-09-26 DIAGNOSIS — R73.9 HYPERGLYCEMIA: ICD-10-CM

## 2023-09-26 DIAGNOSIS — M54.50 CHRONIC MIDLINE LOW BACK PAIN WITHOUT SCIATICA: ICD-10-CM

## 2023-09-26 DIAGNOSIS — Z12.11 SCREENING FOR MALIGNANT NEOPLASM OF COLON: ICD-10-CM

## 2023-09-26 DIAGNOSIS — I25.10 CORONARY ARTERY DISEASE INVOLVING NATIVE HEART WITHOUT ANGINA PECTORIS, UNSPECIFIED VESSEL OR LESION TYPE: ICD-10-CM

## 2023-09-26 DIAGNOSIS — Z00.00 MEDICARE ANNUAL WELLNESS VISIT, SUBSEQUENT: Primary | ICD-10-CM

## 2023-09-26 DIAGNOSIS — M54.42 CHRONIC LEFT-SIDED LOW BACK PAIN WITH LEFT-SIDED SCIATICA: ICD-10-CM

## 2023-09-26 DIAGNOSIS — G89.29 CHRONIC LEFT-SIDED LOW BACK PAIN WITH LEFT-SIDED SCIATICA: ICD-10-CM

## 2023-09-26 DIAGNOSIS — L03.313 CELLULITIS OF CHEST WALL: ICD-10-CM

## 2023-09-26 PROBLEM — R97.20 HIGH PROSTATE SPECIFIC ANTIGEN (PSA): Status: ACTIVE | Noted: 2017-11-28

## 2023-09-26 RX ORDER — ZOLPIDEM TARTRATE 12.5 MG/1
12.5 TABLET, FILM COATED, EXTENDED RELEASE ORAL NIGHTLY PRN
Qty: 30 TABLET | Refills: 2 | Status: SHIPPED | OUTPATIENT
Start: 2023-09-26

## 2023-09-26 RX ORDER — TAMSULOSIN HYDROCHLORIDE 0.4 MG/1
1 CAPSULE ORAL DAILY
COMMUNITY
Start: 2023-08-14 | End: 2023-09-26

## 2023-09-26 RX ORDER — FLUTICASONE PROPIONATE 0.05 %
1 CREAM (GRAM) TOPICAL DAILY
COMMUNITY
Start: 2023-08-28

## 2023-09-26 RX ORDER — EVOLOCUMAB 140 MG/ML
140 INJECTION, SOLUTION SUBCUTANEOUS
Qty: 2 ML | Refills: 5 | Status: SHIPPED | OUTPATIENT
Start: 2023-09-26

## 2023-09-26 RX ORDER — EVOLOCUMAB 140 MG/ML
INJECTION, SOLUTION SUBCUTANEOUS
Qty: 2 ML | Refills: 5 | OUTPATIENT
Start: 2023-09-26

## 2023-09-26 RX ORDER — KETOCONAZOLE 20 MG/ML
SHAMPOO TOPICAL WEEKLY
Qty: 120 ML | Refills: 3 | Status: SHIPPED | OUTPATIENT
Start: 2023-09-26

## 2023-09-26 RX ORDER — TRAMADOL HYDROCHLORIDE 50 MG/1
50 TABLET ORAL EVERY 12 HOURS PRN
Qty: 60 TABLET | Refills: 2 | Status: SHIPPED | OUTPATIENT
Start: 2023-09-26

## 2023-09-26 RX ORDER — DOXYCYCLINE HYCLATE 100 MG
100 TABLET ORAL DAILY
COMMUNITY
Start: 2023-08-28 | End: 2023-09-26

## 2023-09-26 NOTE — ASSESSMENT & PLAN NOTE
Patient with low back pain and a left-sided radiculopathy.  We will get an x-ray and MRI of his back.    Tramadol as needed.

## 2023-09-26 NOTE — PROGRESS NOTES
The ABCs of the Annual Wellness Visit  Subsequent Medicare Wellness Visit    Subjective    Kavon Waters Jr. is a 72 y.o. male who presents for a Subsequent Medicare Wellness Visit.    The following portions of the patient's history were reviewed and   updated as appropriate: allergies, current medications, past family history, past medical history, past social history, past surgical history, and problem list.    Compared to one year ago, the patient feels his physical   health is the same.    Compared to one year ago, the patient feels his mental   health is the same.    Recent Hospitalizations:  He was not admitted to the hospital during the last year.       Current Medical Providers:  Patient Care Team:  Mina Story MD as PCP - General  [facility]ing Physician (Urology)  Rosendo Jo MD (Dermatology)  Alida Mcknight MD as Consulting Physician (Hematology and Oncology)    Outpatient Medications Prior to Visit   Medication Sig Dispense Refill    aspirin 81 MG chewable tablet Chew 1 tablet Daily.      bicalutamide (CASODEX) 50 MG chemo tablet Take 1 tablet by mouth Daily.      cetirizine (zyrTEC) 10 MG tablet TAKE 1 TABLET BY MOUTH EVERY DAY 90 tablet 1    clopidogrel (PLAVIX) 75 MG tablet TAKE 1 TABLET BY MOUTH EVERY DAY 90 tablet 0    fluticasone (CUTIVATE) 0.05 % cream Apply 1 application  topically to the appropriate area as directed Daily.      fluticasone (FLONASE) 50 MCG/ACT nasal spray 2 sprays into the nostril(s) as directed by provider Daily. 48 g 1    levothyroxine (SYNTHROID, LEVOTHROID) 50 MCG tablet TAKE 1 TABLET BY MOUTH DAILY 90 tablet 1    liothyronine (CYTOMEL) 5 MCG tablet TAKE 1 TABLET BY MOUTH TWICE DAILY 180 tablet 0    losartan (COZAAR) 100 MG tablet TAKE 1 TABLET BY MOUTH DAILY 90 tablet 3    lubiprostone (AMITIZA) 24 MCG capsule Take 1 capsule by mouth 2 (Two) Times a Day With Meals. 180 capsule 1    methocarbamol (ROBAXIN) 750 MG tablet  Take 1 tablet by mouth 2 (Two) Times a Day. 180 tablet 1    omeprazole (priLOSEC) 40 MG capsule Take 1 capsule by mouth 2 (Two) Times a Day Before Meals. 180 capsule 1    ciprofloxacin (Cipro) 250 MG tablet Take 1 tablet by mouth 2 (Two) Times a Day. 42 tablet 0    ketoconazole (NIZORAL) 2 % shampoo Apply  topically to the appropriate area as directed 1 (One) Time Per Week. 120 mL 3    Repatha SureClick solution auto-injector SureClick injection INJECT THE CONTENTS OF 1 SYRINGE UNDER THE SKIN EVERY 2 WEEKS 2 mL 5    traMADol (ULTRAM) 50 MG tablet Take 1 tablet by mouth Every 12 (Twelve) Hours As Needed for Moderate Pain. 60 tablet 2    zolpidem CR (Ambien CR) 12.5 MG CR tablet Take 1 tablet by mouth At Night As Needed for Sleep. 30 tablet 2    doxycycline (VIBRAMYICN) 100 MG tablet Take 1 tablet by mouth Daily. (Patient not taking: Reported on 9/26/2023)      ezetimibe (Zetia) 10 MG tablet Take 1 tablet by mouth Daily. (Patient not taking: Reported on 6/19/2023) 30 tablet 5    fluconazole (DIFLUCAN) 150 MG tablet Take 1 tablet by mouth 1 (One) Time Per Week. (Patient not taking: Reported on 6/19/2023)      ketoconazole (NIZORAL) 2 % cream Apply 1 application topically to the appropriate area as directed Daily. (Patient not taking: Reported on 6/26/2023) 60 g 0    methylPREDNISolone (MEDROL) 4 MG tablet Take 1 tablet by mouth Daily. (Patient not taking: Reported on 9/26/2023)      nystatin (MYCOSTATIN) 100,000 unit/mL suspension Swish and swallow 5 mL 4 (Four) Times a Day. (Patient not taking: Reported on 6/26/2023) 60 mL 2    nystatin (MYCOSTATIN) 290036 UNIT/GM cream  (Patient not taking: Reported on 6/19/2023)      oxyCODONE-acetaminophen (PERCOCET) 7.5-325 MG per tablet Take 1 tablet by mouth Every 6 (Six) Hours As Needed. (Patient not taking: Reported on 6/19/2023)      sulfamethoxazole-trimethoprim (BACTRIM DS,SEPTRA DS) 800-160 MG per tablet Take 1 tablet by mouth Every 12 (Twelve) Hours. (Patient not taking:  Reported on 6/19/2023)      tacrolimus (PROTOPIC) 0.1 % ointment APPLY TOPICALLY TO THE AFFECTED AREA TWICE DAILY AS NEEDED (Patient not taking: Reported on 6/26/2023)      tamsulosin (FLOMAX) 0.4 MG capsule 24 hr capsule Take 1 capsule by mouth Daily. (Patient not taking: Reported on 9/26/2023)       No facility-administered medications prior to visit.       Opioid medication/s are on active medication list.  and I have evaluated his active treatment plan and pain score trends (see table).  There were no vitals filed for this visit.  I have reviewed the chart for potential of high risk medication and harmful drug interactions in the elderly.          Aspirin is on active medication list. Aspirin use is indicated based on review of current medical condition/s. Pros and cons of this therapy have been discussed today. Benefits of this medication outweigh potential harm.  Patient has been encouraged to continue taking this medication.  .      Patient Active Problem List   Diagnosis    Unstable angina    Angina at rest    Coronary artery disease involving native coronary artery of native heart with angina pectoris    Peripheral arterial disease    Coronary artery disease involving native heart without angina pectoris    S/P CABG x 2. 4/5/18    Essential hypertension    Hyperlipidemia, mixed    GERD (gastroesophageal reflux disease)    History of prostate Cancer s/p radiation therapy    History of left corona radiata CVA 2015. No residual and no occlusive carotid artery disease    Neck pain    Back pain    Balanitis    Chronic kidney disease    Fatigue    Hot flash due to medication    Injection site reaction    Hormone sensitive prostate cancer    Malignant tumor of prostate    Overweight with body mass index (BMI) 25.0-29.9    Raised prostate specific antigen    Acquired hypothyroidism    Chronic kidney disease, stage 3a    Primary insomnia    Hyperglycemia    Cellulitis of chest wall    BPH associated with nocturia     "Chronic idiopathic constipation    High risk medication use    Ischemic myocardial dysfunction    Long term (current) use of anticoagulants    Obstructive sleep apnea syndrome    Paroxysmal atrial fibrillation    Primary osteoarthritis involving multiple joints    RBBB    Recurrent major depression in full remission    Seasonal allergies    Metastasis from malignant tumor of prostate    Anemia    Prostatitis    Candidiasis of anus    High prostate specific antigen (PSA)     Advance Care Planning   Advance Care Planning     Advance Directive is not on file.  ACP discussion was held with the patient during this visit. Patient does not have an advance directive, declines further assistance.     Objective    Vitals:    23 1026   BP: 138/82   BP Location: Left arm   Patient Position: Sitting   Cuff Size: Adult   Pulse: 68   Resp: 16   SpO2: 98%   Weight: 89.8 kg (198 lb)   Height: 182.9 cm (72.01\")     Estimated body mass index is 26.85 kg/m² as calculated from the following:    Height as of this encounter: 182.9 cm (72.01\").    Weight as of this encounter: 89.8 kg (198 lb).           Does the patient have evidence of cognitive impairment? No          HEALTH RISK ASSESSMENT    Smoking Status:  Social History     Tobacco Use   Smoking Status Former    Packs/day: 1.00    Years: 23.00    Pack years: 23.00    Types: Cigarettes    Start date:     Quit date: 1988    Years since quittin.5   Smokeless Tobacco Never   Tobacco Comments    QUIT 30 YEARS AGO     Alcohol Consumption:  Social History     Substance and Sexual Activity   Alcohol Use No     Fall Risk Screen:    STEADI Fall Risk Assessment was completed, and patient is at LOW risk for falls.Assessment completed on:2023    Depression Screenin/26/2023    10:32 AM   PHQ-2/PHQ-9 Depression Screening   Little Interest or Pleasure in Doing Things 0-->not at all   Feeling Down, Depressed or Hopeless 0-->not at all   PHQ-9: Brief Depression " Severity Measure Score 0       Health Habits and Functional and Cognitive Screenin/26/2023    10:31 AM   Functional & Cognitive Status   Do you have difficulty preparing food and eating? No   Do you have difficulty bathing yourself, getting dressed or grooming yourself? No   Do you have difficulty using the toilet? No   Do you have difficulty moving around from place to place? No   Do you have trouble with steps or getting out of a bed or a chair? No   Current Diet Well Balanced Diet   Dental Exam Up to date   Eye Exam Up to date   Exercise (times per week) 0 times per week   Current Exercises Include Walking;Gardening;Yard Work   Do you need help using the phone?  No   Are you deaf or do you have serious difficulty hearing?  No   Do you need help to go to places out of walking distance? No   Do you need help shopping? No   Do you need help preparing meals?  No   Do you need help with housework?  No   Do you need help with laundry? No   Do you need help taking your medications? No   Do you need help managing money? No   Do you ever drive or ride in a car without wearing a seat belt? No   Have you felt unusual stress, anger or loneliness in the last month? No   Who do you live with? Spouse   If you need help, do you have trouble finding someone available to you? No   Do you have difficulty concentrating, remembering or making decisions? No       Age-appropriate Screening Schedule:  Refer to the list below for future screening recommendations based on patient's age, sex and/or medical conditions. Orders for these recommended tests are listed in the plan section. The patient has been provided with a written plan.    Health Maintenance   Topic Date Due    ZOSTER VACCINE (1 of 2) Never done    COVID-19 Vaccine (4 - Pfizer series) 2022    COLORECTAL CANCER SCREENING  2023    BMI FOLLOWUP  2023    TDAP/TD VACCINES (2 - Td or Tdap) 2024 (Originally 2021)    INFLUENZA VACCINE  10/01/2023  "   LIPID PANEL  06/19/2024    ANNUAL WELLNESS VISIT  09/26/2024    HEPATITIS C SCREENING  Completed    Pneumococcal Vaccine 65+  Completed    AAA SCREEN (ONE-TIME)  Completed                  CMS Preventative Services Quick Reference  Risk Factors Identified During Encounter  None Identified  The above risks/problems have been discussed with the patient.  Pertinent information has been shared with the patient in the After Visit Summary.  An After Visit Summary and PPPS were made available to the patient.    Follow Up:   Next Medicare Wellness visit to be scheduled in 1 year.       Additional E&M Note during same encounter follows:  Patient has multiple medical problems which are significant and separately identifiable that require additional work above and beyond the Medicare Wellness Visit.      Chief Complaint  Medicare Wellness-subsequent and Follow-up    Subjective        HPI  Kavon Waters Jr. is also being seen today for wellness and medication         Objective   Vital Signs:  /82 (BP Location: Left arm, Patient Position: Sitting, Cuff Size: Adult)   Pulse 68   Resp 16   Ht 182.9 cm (72.01\")   Wt 89.8 kg (198 lb)   SpO2 98%   BMI 26.85 kg/m²     Physical Exam                    Assessment and Plan   Diagnoses and all orders for this visit:    1. Medicare annual wellness visit, subsequent (Primary)  -     Hemoglobin A1c; Future  -     Lipid Panel; Future  -     Comprehensive Metabolic Panel; Future  -     Vitamin D,25-Hydroxy; Future  -     TSH Rfx On Abnormal To Free T4; Future  -     CBC & Differential; Future  -     Vitamin B12; Future    2. Primary insomnia  Assessment & Plan:  Ambien.  House bill 1    Orders:  -     zolpidem CR (Ambien CR) 12.5 MG CR tablet; Take 1 tablet by mouth At Night As Needed for Sleep.  Dispense: 30 tablet; Refill: 2  -     traMADol (ULTRAM) 50 MG tablet; Take 1 tablet by mouth Every 12 (Twelve) Hours As Needed for Moderate Pain.  Dispense: 60 tablet; Refill: 2  -     " Hemoglobin A1c; Future  -     Lipid Panel; Future  -     Comprehensive Metabolic Panel; Future  -     Vitamin D,25-Hydroxy; Future  -     TSH Rfx On Abnormal To Free T4; Future  -     CBC & Differential; Future  -     Vitamin B12; Future    3. Cellulitis of chest wall  -     traMADol (ULTRAM) 50 MG tablet; Take 1 tablet by mouth Every 12 (Twelve) Hours As Needed for Moderate Pain.  Dispense: 60 tablet; Refill: 2  -     Hemoglobin A1c; Future  -     Lipid Panel; Future  -     Comprehensive Metabolic Panel; Future  -     Vitamin D,25-Hydroxy; Future  -     TSH Rfx On Abnormal To Free T4; Future  -     CBC & Differential; Future  -     Vitamin B12; Future    4. Acquired hypothyroidism  Assessment & Plan:  Blood work in 3 months    Orders:  -     traMADol (ULTRAM) 50 MG tablet; Take 1 tablet by mouth Every 12 (Twelve) Hours As Needed for Moderate Pain.  Dispense: 60 tablet; Refill: 2  -     Hemoglobin A1c; Future  -     Lipid Panel; Future  -     Comprehensive Metabolic Panel; Future  -     Vitamin D,25-Hydroxy; Future  -     TSH Rfx On Abnormal To Free T4; Future  -     CBC & Differential; Future  -     Vitamin B12; Future    5. Hyperlipidemia, mixed  Assessment & Plan:  Blood work in 3 months    Orders:  -     traMADol (ULTRAM) 50 MG tablet; Take 1 tablet by mouth Every 12 (Twelve) Hours As Needed for Moderate Pain.  Dispense: 60 tablet; Refill: 2  -     Hemoglobin A1c; Future  -     Lipid Panel; Future  -     Comprehensive Metabolic Panel; Future  -     Vitamin D,25-Hydroxy; Future  -     TSH Rfx On Abnormal To Free T4; Future  -     CBC & Differential; Future  -     Vitamin B12; Future    6. Hyperglycemia  Assessment & Plan:  Blood work in 3 months    Orders:  -     traMADol (ULTRAM) 50 MG tablet; Take 1 tablet by mouth Every 12 (Twelve) Hours As Needed for Moderate Pain.  Dispense: 60 tablet; Refill: 2  -     Hemoglobin A1c; Future  -     Lipid Panel; Future  -     Comprehensive Metabolic Panel; Future  -      Vitamin D,25-Hydroxy; Future  -     TSH Rfx On Abnormal To Free T4; Future  -     CBC & Differential; Future  -     Vitamin B12; Future    7. Chronic midline low back pain without sciatica  Assessment & Plan:  Patient with low back pain and a left-sided radiculopathy.  We will get an x-ray and MRI of his back.    Tramadol as needed.    Orders:  -     traMADol (ULTRAM) 50 MG tablet; Take 1 tablet by mouth Every 12 (Twelve) Hours As Needed for Moderate Pain.  Dispense: 60 tablet; Refill: 2  -     Hemoglobin A1c; Future  -     Lipid Panel; Future  -     Comprehensive Metabolic Panel; Future  -     Vitamin D,25-Hydroxy; Future  -     TSH Rfx On Abnormal To Free T4; Future  -     CBC & Differential; Future  -     Vitamin B12; Future    8. Chronic kidney disease, stage 3a  Assessment & Plan:  Patient is instructed to not take any NSAIDs.  Medicines as directed.  Stay well-hydrated.      Orders:  -     traMADol (ULTRAM) 50 MG tablet; Take 1 tablet by mouth Every 12 (Twelve) Hours As Needed for Moderate Pain.  Dispense: 60 tablet; Refill: 2  -     Hemoglobin A1c; Future  -     Lipid Panel; Future  -     Comprehensive Metabolic Panel; Future  -     Vitamin D,25-Hydroxy; Future  -     TSH Rfx On Abnormal To Free T4; Future  -     CBC & Differential; Future  -     Vitamin B12; Future    9. Coronary artery disease involving native heart without angina pectoris, unspecified vessel or lesion type  Assessment & Plan:  risk factor modification    Orders:  -     traMADol (ULTRAM) 50 MG tablet; Take 1 tablet by mouth Every 12 (Twelve) Hours As Needed for Moderate Pain.  Dispense: 60 tablet; Refill: 2  -     Hemoglobin A1c; Future  -     Lipid Panel; Future  -     Comprehensive Metabolic Panel; Future  -     Vitamin D,25-Hydroxy; Future  -     TSH Rfx On Abnormal To Free T4; Future  -     CBC & Differential; Future  -     Vitamin B12; Future    10. Screening for malignant neoplasm of colon  -     Cologuard - Stool, Per Rectum;  Future  -     Hemoglobin A1c; Future  -     Lipid Panel; Future  -     Comprehensive Metabolic Panel; Future  -     Vitamin D,25-Hydroxy; Future  -     TSH Rfx On Abnormal To Free T4; Future  -     CBC & Differential; Future  -     Vitamin B12; Future    11. Encounter for long-term (current) use of other medications  -     POC Urine Drug Screen, Triage  -     Hemoglobin A1c; Future  -     Lipid Panel; Future  -     Comprehensive Metabolic Panel; Future  -     Vitamin D,25-Hydroxy; Future  -     TSH Rfx On Abnormal To Free T4; Future  -     CBC & Differential; Future  -     Vitamin B12; Future    12. Chronic left-sided low back pain with left-sided sciatica  Assessment & Plan:  Patient with low back pain and a left-sided radiculopathy.  We will get an x-ray and MRI of his back.    Tramadol as needed.    Orders:  -     XR Spine Lumbar 2 or 3 View (In Office)  -     MRI Lumbar Spine Without Contrast; Future  -     Hemoglobin A1c; Future  -     Lipid Panel; Future  -     Comprehensive Metabolic Panel; Future  -     Vitamin D,25-Hydroxy; Future  -     TSH Rfx On Abnormal To Free T4; Future  -     CBC & Differential; Future  -     Vitamin B12; Future    Other orders  -     Evolocumab (Repatha SureClick) solution auto-injector SureClick injection; Inject 1 mL under the skin into the appropriate area as directed Every 14 (Fourteen) Days.  Dispense: 2 mL; Refill: 5  -     ketoconazole (NIZORAL) 2 % shampoo; Apply  topically to the appropriate area as directed 1 (One) Time Per Week.  Dispense: 120 mL; Refill: 3           I spent 10 minutes caring for Kavon on this date of service. This time includes time spent by me in the following activities:preparing for the visit, reviewing tests, obtaining and/or reviewing a separately obtained history, performing a medically appropriate examination and/or evaluation , counseling and educating the patient/family/caregiver, ordering medications, tests, or procedures, referring and  communicating with other health care professionals , documenting information in the medical record, independently interpreting results and communicating that information with the patient/family/caregiver, and care coordination  Follow Up   Return in about 3 months (around 12/26/2023).  Patient was given instructions and counseling regarding his condition or for health maintenance advice. Please see specific information pulled into the AVS if appropriate.

## 2023-10-13 ENCOUNTER — TELEPHONE (OUTPATIENT)
Dept: FAMILY MEDICINE CLINIC | Facility: CLINIC | Age: 73
End: 2023-10-13
Payer: MEDICARE

## 2023-10-13 NOTE — TELEPHONE ENCOUNTER
Caller: Kavon Waters Jr.    Relationship: Self    Best call back number: [phone]     What test was performed: KASIA     When was the test performed: 10/06/2023     Additional notes:   PATIENT WOULD LIKE A CALL BACK TO BE INFORMED OF THE RESULTS OF HIS COLOGUARD DONE ON 10/06/2023

## 2023-10-20 DIAGNOSIS — E03.9 HYPOTHYROIDISM, UNSPECIFIED TYPE: ICD-10-CM

## 2023-10-20 RX ORDER — LIOTHYRONINE SODIUM 5 UG/1
TABLET ORAL
Qty: 180 TABLET | Refills: 0 | Status: SHIPPED | OUTPATIENT
Start: 2023-10-20

## 2023-11-25 DIAGNOSIS — K21.9 GASTROESOPHAGEAL REFLUX DISEASE WITHOUT ESOPHAGITIS: ICD-10-CM

## 2023-11-25 DIAGNOSIS — I73.9 PAD (PERIPHERAL ARTERY DISEASE): ICD-10-CM

## 2023-11-27 RX ORDER — OMEPRAZOLE 40 MG/1
40 CAPSULE, DELAYED RELEASE ORAL
Qty: 180 CAPSULE | Refills: 1 | Status: SHIPPED | OUTPATIENT
Start: 2023-11-27

## 2023-11-27 RX ORDER — CLOPIDOGREL BISULFATE 75 MG/1
TABLET ORAL
Qty: 90 TABLET | Refills: 0 | Status: SHIPPED | OUTPATIENT
Start: 2023-11-27

## 2023-12-06 RX ORDER — LEVOTHYROXINE SODIUM 0.05 MG/1
50 TABLET ORAL DAILY
Qty: 90 TABLET | Refills: 0 | Status: SHIPPED | OUTPATIENT
Start: 2023-12-06

## 2023-12-11 ENCOUNTER — HOSPITAL ENCOUNTER (OUTPATIENT)
Dept: MRI IMAGING | Facility: HOSPITAL | Age: 73
Discharge: HOME | End: 2023-12-11
Payer: MEDICARE

## 2023-12-11 ENCOUNTER — HOSPITAL ENCOUNTER (OUTPATIENT)
Dept: GENERAL RADIOLOGY | Facility: HOSPITAL | Age: 73
Discharge: HOME | End: 2023-12-11
Payer: MEDICARE

## 2023-12-11 DIAGNOSIS — M54.42 CHRONIC LEFT-SIDED LOW BACK PAIN WITH LEFT-SIDED SCIATICA: ICD-10-CM

## 2023-12-11 DIAGNOSIS — G89.29 CHRONIC LEFT-SIDED LOW BACK PAIN WITH LEFT-SIDED SCIATICA: ICD-10-CM

## 2023-12-11 PROCEDURE — 72100 X-RAY EXAM L-S SPINE 2/3 VWS: CPT

## 2023-12-11 PROCEDURE — 72148 MRI LUMBAR SPINE W/O DYE: CPT

## 2023-12-22 ENCOUNTER — LAB (OUTPATIENT)
Dept: FAMILY MEDICINE CLINIC | Facility: CLINIC | Age: 73
End: 2023-12-22
Payer: MEDICARE

## 2023-12-22 DIAGNOSIS — R73.9 HYPERGLYCEMIA: ICD-10-CM

## 2023-12-22 DIAGNOSIS — M54.50 CHRONIC MIDLINE LOW BACK PAIN WITHOUT SCIATICA: ICD-10-CM

## 2023-12-22 DIAGNOSIS — L03.313 CELLULITIS OF CHEST WALL: ICD-10-CM

## 2023-12-22 DIAGNOSIS — Z00.00 MEDICARE ANNUAL WELLNESS VISIT, SUBSEQUENT: ICD-10-CM

## 2023-12-22 DIAGNOSIS — N18.31 CHRONIC KIDNEY DISEASE, STAGE 3A: ICD-10-CM

## 2023-12-22 DIAGNOSIS — E03.9 ACQUIRED HYPOTHYROIDISM: ICD-10-CM

## 2023-12-22 DIAGNOSIS — M54.42 CHRONIC LEFT-SIDED LOW BACK PAIN WITH LEFT-SIDED SCIATICA: ICD-10-CM

## 2023-12-22 DIAGNOSIS — G89.29 CHRONIC LEFT-SIDED LOW BACK PAIN WITH LEFT-SIDED SCIATICA: ICD-10-CM

## 2023-12-22 DIAGNOSIS — F51.01 PRIMARY INSOMNIA: ICD-10-CM

## 2023-12-22 DIAGNOSIS — E78.2 HYPERLIPIDEMIA, MIXED: ICD-10-CM

## 2023-12-22 DIAGNOSIS — Z79.899 ENCOUNTER FOR LONG-TERM (CURRENT) USE OF OTHER MEDICATIONS: ICD-10-CM

## 2023-12-22 DIAGNOSIS — I25.10 CORONARY ARTERY DISEASE INVOLVING NATIVE HEART WITHOUT ANGINA PECTORIS, UNSPECIFIED VESSEL OR LESION TYPE: ICD-10-CM

## 2023-12-22 DIAGNOSIS — Z12.11 SCREENING FOR MALIGNANT NEOPLASM OF COLON: ICD-10-CM

## 2023-12-22 DIAGNOSIS — G89.29 CHRONIC MIDLINE LOW BACK PAIN WITHOUT SCIATICA: ICD-10-CM

## 2023-12-23 LAB
25(OH)D3+25(OH)D2 SERPL-MCNC: 60.8 NG/ML (ref 30–100)
ALBUMIN SERPL-MCNC: 4.3 G/DL (ref 3.8–4.8)
ALBUMIN/GLOB SERPL: 1.9 {RATIO} (ref 1.2–2.2)
ALP SERPL-CCNC: 93 IU/L (ref 44–121)
ALT SERPL-CCNC: 16 IU/L (ref 0–44)
AST SERPL-CCNC: 20 IU/L (ref 0–40)
BASOPHILS # BLD AUTO: 0.1 X10E3/UL (ref 0–0.2)
BASOPHILS NFR BLD AUTO: 1 %
BILIRUB SERPL-MCNC: 0.3 MG/DL (ref 0–1.2)
BUN SERPL-MCNC: 20 MG/DL (ref 8–27)
BUN/CREAT SERPL: 15 (ref 10–24)
CALCIUM SERPL-MCNC: 9.5 MG/DL (ref 8.6–10.2)
CHLORIDE SERPL-SCNC: 100 MMOL/L (ref 96–106)
CHOLEST SERPL-MCNC: 163 MG/DL (ref 100–199)
CO2 SERPL-SCNC: 22 MMOL/L (ref 20–29)
CREAT SERPL-MCNC: 1.31 MG/DL (ref 0.76–1.27)
EGFRCR SERPLBLD CKD-EPI 2021: 57 ML/MIN/1.73
EOSINOPHIL # BLD AUTO: 0.4 X10E3/UL (ref 0–0.4)
EOSINOPHIL NFR BLD AUTO: 6 %
ERYTHROCYTE [DISTWIDTH] IN BLOOD BY AUTOMATED COUNT: 12.7 % (ref 11.6–15.4)
GLOBULIN SER CALC-MCNC: 2.3 G/DL (ref 1.5–4.5)
GLUCOSE SERPL-MCNC: 98 MG/DL (ref 70–99)
HBA1C MFR BLD: 5.7 % (ref 4.8–5.6)
HCT VFR BLD AUTO: 35.8 % (ref 37.5–51)
HDLC SERPL-MCNC: 47 MG/DL
HGB BLD-MCNC: 12.2 G/DL (ref 13–17.7)
IMM GRANULOCYTES # BLD AUTO: 0 X10E3/UL (ref 0–0.1)
IMM GRANULOCYTES NFR BLD AUTO: 0 %
LDLC SERPL CALC-MCNC: 94 MG/DL (ref 0–99)
LYMPHOCYTES # BLD AUTO: 1.4 X10E3/UL (ref 0.7–3.1)
LYMPHOCYTES NFR BLD AUTO: 23 %
MCH RBC QN AUTO: 31.9 PG (ref 26.6–33)
MCHC RBC AUTO-ENTMCNC: 34.1 G/DL (ref 31.5–35.7)
MCV RBC AUTO: 94 FL (ref 79–97)
MONOCYTES # BLD AUTO: 0.6 X10E3/UL (ref 0.1–0.9)
MONOCYTES NFR BLD AUTO: 10 %
NEUTROPHILS # BLD AUTO: 3.5 X10E3/UL (ref 1.4–7)
NEUTROPHILS NFR BLD AUTO: 60 %
PLATELET # BLD AUTO: 185 X10E3/UL (ref 150–450)
POTASSIUM SERPL-SCNC: 4.3 MMOL/L (ref 3.5–5.2)
PROT SERPL-MCNC: 6.6 G/DL (ref 6–8.5)
RBC # BLD AUTO: 3.83 X10E6/UL (ref 4.14–5.8)
SODIUM SERPL-SCNC: 136 MMOL/L (ref 134–144)
TRIGL SERPL-MCNC: 124 MG/DL (ref 0–149)
TSH SERPL DL<=0.005 MIU/L-ACNC: 3.78 UIU/ML (ref 0.45–4.5)
VIT B12 SERPL-MCNC: 898 PG/ML (ref 232–1245)
VLDLC SERPL CALC-MCNC: 22 MG/DL (ref 5–40)
WBC # BLD AUTO: 5.9 X10E3/UL (ref 3.4–10.8)

## 2023-12-27 ENCOUNTER — OFFICE VISIT (OUTPATIENT)
Dept: FAMILY MEDICINE CLINIC | Facility: CLINIC | Age: 73
End: 2023-12-27
Payer: MEDICARE

## 2023-12-27 VITALS
BODY MASS INDEX: 28.2 KG/M2 | HEIGHT: 72 IN | DIASTOLIC BLOOD PRESSURE: 70 MMHG | OXYGEN SATURATION: 93 % | WEIGHT: 208.2 LBS | SYSTOLIC BLOOD PRESSURE: 112 MMHG | HEART RATE: 56 BPM

## 2023-12-27 DIAGNOSIS — R73.9 HYPERGLYCEMIA: ICD-10-CM

## 2023-12-27 DIAGNOSIS — M15.9 PRIMARY OSTEOARTHRITIS INVOLVING MULTIPLE JOINTS: Primary | ICD-10-CM

## 2023-12-27 DIAGNOSIS — F51.01 PRIMARY INSOMNIA: ICD-10-CM

## 2023-12-27 DIAGNOSIS — I10 ESSENTIAL HYPERTENSION: ICD-10-CM

## 2023-12-27 DIAGNOSIS — L03.313 CELLULITIS OF CHEST WALL: ICD-10-CM

## 2023-12-27 DIAGNOSIS — M54.50 CHRONIC MIDLINE LOW BACK PAIN WITHOUT SCIATICA: ICD-10-CM

## 2023-12-27 DIAGNOSIS — G89.29 CHRONIC MIDLINE LOW BACK PAIN WITHOUT SCIATICA: ICD-10-CM

## 2023-12-27 DIAGNOSIS — E03.9 ACQUIRED HYPOTHYROIDISM: ICD-10-CM

## 2023-12-27 DIAGNOSIS — I25.10 CORONARY ARTERY DISEASE INVOLVING NATIVE HEART WITHOUT ANGINA PECTORIS, UNSPECIFIED VESSEL OR LESION TYPE: ICD-10-CM

## 2023-12-27 DIAGNOSIS — N18.31 CHRONIC KIDNEY DISEASE, STAGE 3A: ICD-10-CM

## 2023-12-27 DIAGNOSIS — E78.2 HYPERLIPIDEMIA, MIXED: ICD-10-CM

## 2023-12-27 RX ORDER — ZOLPIDEM TARTRATE 12.5 MG/1
12.5 TABLET, FILM COATED, EXTENDED RELEASE ORAL NIGHTLY PRN
Qty: 30 TABLET | Refills: 2 | Status: SHIPPED | OUTPATIENT
Start: 2023-12-27

## 2023-12-27 RX ORDER — TRAMADOL HYDROCHLORIDE 50 MG/1
50 TABLET ORAL EVERY 12 HOURS PRN
Qty: 60 TABLET | Refills: 2 | Status: SHIPPED | OUTPATIENT
Start: 2023-12-27

## 2023-12-27 NOTE — ASSESSMENT & PLAN NOTE
Reviewed MRI.  Patient is some spinal stenosis.  He has CKD so cannot give NSAIDs for his pain.  I am going to give him some tramadol.  House bill 1.

## 2023-12-27 NOTE — ASSESSMENT & PLAN NOTE
GFR is 57.Patient is instructed to not take any NSAIDs.  Medicines as directed.  Stay well-hydrated.

## 2023-12-27 NOTE — ASSESSMENT & PLAN NOTE
Refill Ambien.  House bill 1.  He is aware that long-term use this medication can lead to dementia.

## 2023-12-27 NOTE — PROGRESS NOTES
Patient Name: Kavon Waters Jr.  : 1950   MRN: [id number]     Chief Complaint:    Chief Complaint   Patient presents with    Med Refill       History of Present Illness: Kavon Waters Jr. is a 73 y.o. male who is here today for follow up on sleep and low back pain  HPI        Review of Systems:   Review of Systems   Constitutional: Negative.    HENT: Negative.     Eyes: Negative.    Respiratory: Negative.     Cardiovascular: Negative.    Gastrointestinal: Negative.    Neurological: Negative.         Past Medical History:   Past Medical History:   Diagnosis Date    Acquired hypothyroidism     Allergies     Arrhythmia     Arthritis     Atrial fibrillation     AV block, 1st degree     Borderline diabetes     BPH associated with nocturia     Chronic idiopathic constipation     CKD (chronic kidney disease), stage III     Constipation     Coronary artery disease involving native coronary artery of native heart without angina pectoris     Depression     Disease of thyroid gland     Eczema     Essential hypertension     Full dentures     GERD (gastroesophageal reflux disease)     H/O heart artery stent     X 7    High risk medication use     Ischemic cardiomyopathy     Long term (current) use of anticoagulants     Low sodium levels     Lower back pain     Mixed hyperlipidemia     PAUL (obstructive sleep apnea)     NO CPAP USE    Paroxysmal A-fib     Primary osteoarthritis involving multiple joints     Prostate cancer     HAD 43 RADIATION TREATMENTS.    Prostatic hypertrophy     Benign    RBBB     Recurrent major depressive disorder, in full remission     Seasonal allergies     Skin cancer     Stroke     AFFECTED RIGHT ARM/LEG.  PT DENIES RESIDUAL DEFICITS.    Thyroid disease     Wears glasses        Past Surgical History:   Past Surgical History:   Procedure Laterality Date    CARDIAC CATHETERIZATION      CARDIAC CATHETERIZATION N/A 2018    Procedure: Left Heart Cath;  Surgeon: Moshe Ahn MD;   Location: [facility] CATH INVASIVE LOCATION;  Service: Cardiovascular    CHOLECYSTECTOMY      CIRCUMCISION  2005    COLONOSCOPY  2013    CORONARY ARTERY BYPASS GRAFT N/A 2018    Procedure: MEDIASTERNOTOMY CORONARY ARTERY BYPASS GRAFT X2 UTILIZING LEFT INTERNAL MAMMARY ARTERY, EVH LEFT GREATER SAPHENOUS VEIN;  Surgeon: Rakesh Mittal MD;  Location: [facility] OR;  Service: Cardiothoracic    CORONARY STENT PLACEMENT      SKIN CANCER EXCISION         Family History:   Family History   Problem Relation Age of Onset    Kidney failure Mother         COMPLICATIONS OF DIALYSIS       Social History:   Social History     Socioeconomic History    Marital status:      Spouse name: Mara    Number of children: 2    Highest education level: 9th grade   Tobacco Use    Smoking status: Former     Packs/day: 1.00     Years: 23.00     Additional pack years: 0.00     Total pack years: 23.00     Types: Cigarettes     Start date:      Quit date: 1988     Years since quittin.7    Smokeless tobacco: Never    Tobacco comments:     QUIT 30 YEARS AGO   Vaping Use    Vaping Use: Never used   Substance and Sexual Activity    Alcohol use: No    Drug use: No    Sexual activity: Not Currently       Medications:     Current Outpatient Medications:     aspirin 81 MG chewable tablet, Chew 1 tablet Daily., Disp: , Rfl:     bicalutamide (CASODEX) 50 MG chemo tablet, Take 1 tablet by mouth Daily., Disp: , Rfl:     cetirizine (zyrTEC) 10 MG tablet, TAKE 1 TABLET BY MOUTH EVERY DAY, Disp: 90 tablet, Rfl: 1    clopidogrel (PLAVIX) 75 MG tablet, TAKE 1 TABLET BY MOUTH EVERY DAY, Disp: 90 tablet, Rfl: 0    Evolocumab (Repatha SureClick) solution auto-injector SureClick injection, Inject 1 mL under the skin into the appropriate area as directed Every 14 (Fourteen) Days., Disp: 2 mL, Rfl: 5    fluticasone (CUTIVATE) 0.05 % cream, Apply 1 application  topically to the appropriate area as directed Daily., Disp: , Rfl:     fluticasone  "(FLONASE) 50 MCG/ACT nasal spray, 2 sprays into the nostril(s) as directed by provider Daily., Disp: 48 g, Rfl: 1    ketoconazole (NIZORAL) 2 % shampoo, Apply  topically to the appropriate area as directed 1 (One) Time Per Week., Disp: 120 mL, Rfl: 3    levothyroxine (SYNTHROID, LEVOTHROID) 50 MCG tablet, TAKE 1 TABLET BY MOUTH DAILY, Disp: 90 tablet, Rfl: 0    liothyronine (CYTOMEL) 5 MCG tablet, TAKE 1 TABLET BY MOUTH TWICE DAILY, Disp: 180 tablet, Rfl: 0    losartan (COZAAR) 100 MG tablet, TAKE 1 TABLET BY MOUTH DAILY, Disp: 90 tablet, Rfl: 3    lubiprostone (AMITIZA) 24 MCG capsule, Take 1 capsule by mouth 2 (Two) Times a Day With Meals., Disp: 180 capsule, Rfl: 1    methocarbamol (ROBAXIN) 750 MG tablet, Take 1 tablet by mouth 2 (Two) Times a Day., Disp: 180 tablet, Rfl: 1    omeprazole (priLOSEC) 40 MG capsule, TAKE 1 CAPSULE BY MOUTH TWICE DAILY BEFORE MEALS, Disp: 180 capsule, Rfl: 1    traMADol (ULTRAM) 50 MG tablet, Take 1 tablet by mouth Every 12 (Twelve) Hours As Needed for Moderate Pain., Disp: 60 tablet, Rfl: 2    zolpidem CR (Ambien CR) 12.5 MG CR tablet, Take 1 tablet by mouth At Night As Needed for Sleep., Disp: 30 tablet, Rfl: 2    Allergies:   Allergies   Allergen Reactions    Ibuprofen Other (See Comments)     Instructed not to take due to his kidneys         Physical Exam:  Vital Signs:   Vitals:    12/27/23 1534   BP: 112/70   BP Location: Left arm   Patient Position: Sitting   Cuff Size: Large Adult   Pulse: 56   SpO2: 93%   Weight: 94.4 kg (208 lb 3.2 oz)   Height: 182.9 cm (72\")   PainSc: 0-No pain     Body mass index is 28.24 kg/m².     Physical Exam  Vitals and nursing note reviewed.   Constitutional:       Appearance: Normal appearance. He is normal weight.   HENT:      Head: Normocephalic and atraumatic.      Right Ear: Tympanic membrane, ear canal and external ear normal.      Left Ear: Tympanic membrane, ear canal and external ear normal.      Nose: Nose normal.      Mouth/Throat:      " Mouth: Mucous membranes are dry.      Pharynx: Oropharynx is clear.   Eyes:      Extraocular Movements: Extraocular movements intact.      Conjunctiva/sclera: Conjunctivae normal.      Pupils: Pupils are equal, round, and reactive to light.   Cardiovascular:      Rate and Rhythm: Normal rate and regular rhythm.      Pulses: Normal pulses.      Heart sounds: Normal heart sounds.   Pulmonary:      Effort: Pulmonary effort is normal.      Breath sounds: Normal breath sounds.   Musculoskeletal:      Cervical back: Normal range of motion and neck supple.   Feet:      Comments:      Neurological:      Mental Status: He is alert.         Procedures      Assessment/Plan:   Diagnoses and all orders for this visit:    1. Primary osteoarthritis involving multiple joints (Primary)  Assessment & Plan:  Reviewed MRI.  Patient is some spinal stenosis.  He has CKD so cannot give NSAIDs for his pain.  I am going to give him some tramadol.  House bill 1.      2. Primary insomnia  Assessment & Plan:  Refill Ambien.  House bill 1.  He is aware that long-term use this medication can lead to dementia.    Orders:  -     traMADol (ULTRAM) 50 MG tablet; Take 1 tablet by mouth Every 12 (Twelve) Hours As Needed for Moderate Pain.  Dispense: 60 tablet; Refill: 2  -     zolpidem CR (Ambien CR) 12.5 MG CR tablet; Take 1 tablet by mouth At Night As Needed for Sleep.  Dispense: 30 tablet; Refill: 2    3. Essential hypertension  Assessment & Plan:  Discussed with patient to monitor their blood pressure and if systolic blood pressure goes above 140 or diastolic is above 90 to return to clinic.  Take medicines as directed, call for any problems, patient not having or any worrisome symptoms.          4. Hyperlipidemia, mixed  Assessment & Plan:  HDL 47.  LDL 94.  Triglycerides 124.  We will follow-up.    Orders:  -     traMADol (ULTRAM) 50 MG tablet; Take 1 tablet by mouth Every 12 (Twelve) Hours As Needed for Moderate Pain.  Dispense: 60 tablet;  Refill: 2    5. Acquired hypothyroidism  Assessment & Plan:  TSH is 3.780.  We will follow.    Orders:  -     traMADol (ULTRAM) 50 MG tablet; Take 1 tablet by mouth Every 12 (Twelve) Hours As Needed for Moderate Pain.  Dispense: 60 tablet; Refill: 2    6. Chronic kidney disease, stage 3a  Assessment & Plan:  GFR is 57.Patient is instructed to not take any NSAIDs.  Medicines as directed.  Stay well-hydrated.      Orders:  -     traMADol (ULTRAM) 50 MG tablet; Take 1 tablet by mouth Every 12 (Twelve) Hours As Needed for Moderate Pain.  Dispense: 60 tablet; Refill: 2    7. Cellulitis of chest wall  -     traMADol (ULTRAM) 50 MG tablet; Take 1 tablet by mouth Every 12 (Twelve) Hours As Needed for Moderate Pain.  Dispense: 60 tablet; Refill: 2    8. Hyperglycemia  -     traMADol (ULTRAM) 50 MG tablet; Take 1 tablet by mouth Every 12 (Twelve) Hours As Needed for Moderate Pain.  Dispense: 60 tablet; Refill: 2    9. Chronic midline low back pain without sciatica  -     traMADol (ULTRAM) 50 MG tablet; Take 1 tablet by mouth Every 12 (Twelve) Hours As Needed for Moderate Pain.  Dispense: 60 tablet; Refill: 2    10. Coronary artery disease involving native heart without angina pectoris, unspecified vessel or lesion type  -     traMADol (ULTRAM) 50 MG tablet; Take 1 tablet by mouth Every 12 (Twelve) Hours As Needed for Moderate Pain.  Dispense: 60 tablet; Refill: 2             Follow Up:   Return in about 3 months (around 3/27/2024) for Recheck.    Mina Story MD  [facility]     Answers submitted by the patient for this visit:  Primary Reason for Visit (Submitted on 12/26/2023)  What is the primary reason for your visit?: Other  Other (Submitted on 12/26/2023)  Please describe your symptoms.: Routine checkup and refill of medications.  Have you had these symptoms before?: Yes  How long have you been having these symptoms?: Greater than 2 weeks

## 2024-01-19 DIAGNOSIS — E03.9 HYPOTHYROIDISM, UNSPECIFIED TYPE: ICD-10-CM

## 2024-01-19 RX ORDER — LIOTHYRONINE SODIUM 5 UG/1
TABLET ORAL
Qty: 180 TABLET | Refills: 0 | Status: SHIPPED | OUTPATIENT
Start: 2024-01-19

## 2024-01-30 DIAGNOSIS — K59.00 CONSTIPATION, UNSPECIFIED CONSTIPATION TYPE: ICD-10-CM

## 2024-01-31 RX ORDER — LUBIPROSTONE 24 UG/1
CAPSULE ORAL
Qty: 180 CAPSULE | Refills: 1 | Status: SHIPPED | OUTPATIENT
Start: 2024-01-31

## 2024-02-28 DIAGNOSIS — I73.9 PAD (PERIPHERAL ARTERY DISEASE): ICD-10-CM

## 2024-02-28 RX ORDER — LEVOTHYROXINE SODIUM 0.05 MG/1
50 TABLET ORAL DAILY
Qty: 90 TABLET | Refills: 0 | Status: SHIPPED | OUTPATIENT
Start: 2024-02-28

## 2024-02-28 RX ORDER — CETIRIZINE HYDROCHLORIDE 10 MG/1
TABLET ORAL
Qty: 90 TABLET | Refills: 0 | Status: SHIPPED | OUTPATIENT
Start: 2024-02-28

## 2024-02-28 RX ORDER — CLOPIDOGREL BISULFATE 75 MG/1
TABLET ORAL
Qty: 90 TABLET | Refills: 0 | Status: SHIPPED | OUTPATIENT
Start: 2024-02-28

## 2024-02-28 NOTE — TELEPHONE ENCOUNTER
Rx Refill Note    Requested Prescriptions     Pending Prescriptions Disp Refills    cetirizine (zyrTEC) 10 MG tablet [Pharmacy Med Name: CETIRIZINE 10MG TABLETS] 90 tablet 0     Sig: TAKE 1 TABLET BY MOUTH EVERY DAY    clopidogrel (PLAVIX) 75 MG tablet [Pharmacy Med Name: CLOPIDOGREL 75MG TABLETS] 90 tablet 0     Sig: TAKE 1 TABLET BY MOUTH EVERY DAY    levothyroxine (SYNTHROID, LEVOTHROID) 50 MCG tablet [Pharmacy Med Name: LEVOTHYROXINE 0.05MG (50MCG) TAB] 90 tablet 0     Sig: TAKE 1 TABLET BY MOUTH DAILY        Last office visit with prescribing clinician: 12/27/2023      Next office visit with prescribing clinician: 3/27/2024   Last labs:   Last refill: needs   Pharmacy (be sure to add in Epic). correct

## 2024-03-15 RX ORDER — EVOLOCUMAB 140 MG/ML
INJECTION, SOLUTION SUBCUTANEOUS
Qty: 2 ML | Refills: 5 | Status: SHIPPED | OUTPATIENT
Start: 2024-03-15

## 2024-03-27 ENCOUNTER — OFFICE VISIT (OUTPATIENT)
Dept: FAMILY MEDICINE CLINIC | Facility: CLINIC | Age: 74
End: 2024-03-27
Payer: MEDICARE

## 2024-03-27 VITALS
WEIGHT: 207.7 LBS | RESPIRATION RATE: 16 BRPM | DIASTOLIC BLOOD PRESSURE: 88 MMHG | HEIGHT: 72 IN | BODY MASS INDEX: 28.13 KG/M2 | SYSTOLIC BLOOD PRESSURE: 138 MMHG

## 2024-03-27 DIAGNOSIS — L03.313 CELLULITIS OF CHEST WALL: ICD-10-CM

## 2024-03-27 DIAGNOSIS — N18.31 CHRONIC KIDNEY DISEASE, STAGE 3A: ICD-10-CM

## 2024-03-27 DIAGNOSIS — E03.9 ACQUIRED HYPOTHYROIDISM: ICD-10-CM

## 2024-03-27 DIAGNOSIS — G89.29 CHRONIC MIDLINE LOW BACK PAIN WITHOUT SCIATICA: ICD-10-CM

## 2024-03-27 DIAGNOSIS — M54.50 CHRONIC MIDLINE LOW BACK PAIN WITHOUT SCIATICA: ICD-10-CM

## 2024-03-27 DIAGNOSIS — Z12.11 SCREENING FOR MALIGNANT NEOPLASM OF COLON: Primary | ICD-10-CM

## 2024-03-27 DIAGNOSIS — E78.2 HYPERLIPIDEMIA, MIXED: ICD-10-CM

## 2024-03-27 DIAGNOSIS — I25.10 CORONARY ARTERY DISEASE INVOLVING NATIVE HEART WITHOUT ANGINA PECTORIS, UNSPECIFIED VESSEL OR LESION TYPE: ICD-10-CM

## 2024-03-27 DIAGNOSIS — R73.9 HYPERGLYCEMIA: ICD-10-CM

## 2024-03-27 DIAGNOSIS — F51.01 PRIMARY INSOMNIA: ICD-10-CM

## 2024-03-27 RX ORDER — TRAMADOL HYDROCHLORIDE 50 MG/1
50 TABLET ORAL EVERY 12 HOURS PRN
Qty: 60 TABLET | Refills: 2 | Status: SHIPPED | OUTPATIENT
Start: 2024-03-27

## 2024-03-27 RX ORDER — ZOLPIDEM TARTRATE 12.5 MG/1
12.5 TABLET, FILM COATED, EXTENDED RELEASE ORAL NIGHTLY PRN
Qty: 30 TABLET | Refills: 2 | Status: SHIPPED | OUTPATIENT
Start: 2024-03-27

## 2024-03-27 NOTE — PROGRESS NOTES
Patient Name: Kavon Waters Jr.  : 1950   MRN: [id number]     Chief Complaint:    Chief Complaint   Patient presents with    Follow-up       History of Present Illness: Kavon Waters Jr. is a 73 y.o. male who is here today for follow up on sleep and pain  HPI        Review of Systems:   Review of Systems   Constitutional: Negative.    HENT: Negative.     Eyes: Negative.    Respiratory: Negative.     Cardiovascular: Negative.    Gastrointestinal: Negative.    Neurological: Negative.         Past Medical History:   Past Medical History:   Diagnosis Date    Acquired hypothyroidism     Allergies     Arrhythmia     Arthritis     Atrial fibrillation     AV block, 1st degree     Borderline diabetes     BPH associated with nocturia     Chronic idiopathic constipation     CKD (chronic kidney disease), stage III     Constipation     Coronary artery disease involving native coronary artery of native heart without angina pectoris     Depression     Disease of thyroid gland     Eczema     Essential hypertension     Full dentures     GERD (gastroesophageal reflux disease)     H/O heart artery stent     X 7    High risk medication use     Ischemic cardiomyopathy     Long term (current) use of anticoagulants     Low sodium levels     Lower back pain     Mixed hyperlipidemia     PAUL (obstructive sleep apnea)     NO CPAP USE    Paroxysmal A-fib     Primary osteoarthritis involving multiple joints     Prostate cancer     HAD 43 RADIATION TREATMENTS.    Prostatic hypertrophy     Benign    RBBB     Recurrent major depressive disorder, in full remission     Seasonal allergies     Skin cancer     Stroke     AFFECTED RIGHT ARM/LEG.  PT DENIES RESIDUAL DEFICITS.    Thyroid disease     Wears glasses        Past Surgical History:   Past Surgical History:   Procedure Laterality Date    CARDIAC CATHETERIZATION      CARDIAC CATHETERIZATION N/A 2018    Procedure: Left Heart Cath;  Surgeon: Moshe Ahn MD;  Location:   [facility] CATH INVASIVE LOCATION;  Service: Cardiovascular    CHOLECYSTECTOMY      CIRCUMCISION  2005    COLONOSCOPY  2013    CORONARY ARTERY BYPASS GRAFT N/A 2018    Procedure: MEDIASTERNOTOMY CORONARY ARTERY BYPASS GRAFT X2 UTILIZING LEFT INTERNAL MAMMARY ARTERY, EVH LEFT GREATER SAPHENOUS VEIN;  Surgeon: Rakesh Mittal MD;  [facility];  Service: Cardiothoracic    CORONARY STENT PLACEMENT      SKIN CANCER EXCISION         Family History:   Family History   Problem Relation Age of Onset    Kidney failure Mother         COMPLICATIONS OF DIALYSIS       Social History:   Social History     Socioeconomic History    Marital status:      Spouse name: Mara    Number of children: 2    Highest education level: 9th grade   Tobacco Use    Smoking status: Former     Current packs/day: 0.00     Average packs/day: 1 pack/day for 23.3 years (23.3 ttl pk-yrs)     Types: Cigarettes     Start date:      Quit date: 1988     Years since quittin.0    Smokeless tobacco: Never    Tobacco comments:     QUIT 30 YEARS AGO   Vaping Use    Vaping status: Never Used   Substance and Sexual Activity    Alcohol use: No    Drug use: No    Sexual activity: Not Currently       Medications:     Current Outpatient Medications:     aspirin 81 MG chewable tablet, Chew 1 tablet Daily., Disp: , Rfl:     bicalutamide (CASODEX) 50 MG chemo tablet, Take 1 tablet by mouth Daily., Disp: , Rfl:     cetirizine (zyrTEC) 10 MG tablet, TAKE 1 TABLET BY MOUTH EVERY DAY, Disp: 90 tablet, Rfl: 0    clopidogrel (PLAVIX) 75 MG tablet, TAKE 1 TABLET BY MOUTH EVERY DAY, Disp: 90 tablet, Rfl: 0    fluticasone (CUTIVATE) 0.05 % cream, Apply 1 Application topically to the appropriate area as directed Daily., Disp: , Rfl:     fluticasone (FLONASE) 50 MCG/ACT nasal spray, 2 sprays into the nostril(s) as directed by provider Daily., Disp: 48 g, Rfl: 1    ketoconazole (NIZORAL) 2 % shampoo, Apply  topically to the appropriate area as  "directed 1 (One) Time Per Week., Disp: 120 mL, Rfl: 3    levothyroxine (SYNTHROID, LEVOTHROID) 50 MCG tablet, TAKE 1 TABLET BY MOUTH DAILY, Disp: 90 tablet, Rfl: 0    liothyronine (CYTOMEL) 5 MCG tablet, TAKE 1 TABLET BY MOUTH TWICE DAILY, Disp: 180 tablet, Rfl: 0    losartan (COZAAR) 100 MG tablet, TAKE 1 TABLET BY MOUTH DAILY, Disp: 90 tablet, Rfl: 3    lubiprostone (AMITIZA) 24 MCG capsule, TAKE ONE CAPSULE BY MOUTH TWICE DAILY WITH FOOD OR WATER, Disp: 180 capsule, Rfl: 1    methocarbamol (ROBAXIN) 750 MG tablet, Take 1 tablet by mouth 2 (Two) Times a Day., Disp: 180 tablet, Rfl: 1    omeprazole (priLOSEC) 40 MG capsule, TAKE 1 CAPSULE BY MOUTH TWICE DAILY BEFORE MEALS, Disp: 180 capsule, Rfl: 1    Repatha SureClick solution auto-injector SureClick injection, ADMINISTER 1 ML UNDER THE SKIN EVERY 14 DAYS AS DIRECTED, Disp: 2 mL, Rfl: 5    traMADol (ULTRAM) 50 MG tablet, Take 1 tablet by mouth Every 12 (Twelve) Hours As Needed for Moderate Pain., Disp: 60 tablet, Rfl: 2    zolpidem CR (Ambien CR) 12.5 MG CR tablet, Take 1 tablet by mouth At Night As Needed for Sleep., Disp: 30 tablet, Rfl: 2    Allergies:   Allergies   Allergen Reactions    Ibuprofen Other (See Comments)     Instructed not to take due to his kidneys         Physical Exam:  Vital Signs:   Vitals:    03/27/24 1418   BP: 138/88   Resp: 16   Weight: 94.2 kg (207 lb 11.2 oz)   Height: 182.9 cm (72.01\")     Body mass index is 28.16 kg/m².     Physical Exam  Vitals and nursing note reviewed.   Constitutional:       Appearance: Normal appearance. He is normal weight.   HENT:      Head: Normocephalic and atraumatic.      Right Ear: Tympanic membrane, ear canal and external ear normal.      Left Ear: Tympanic membrane, ear canal and external ear normal.      Nose: Nose normal.      Mouth/Throat:      Mouth: Mucous membranes are dry.      Pharynx: Oropharynx is clear.   Eyes:      Extraocular Movements: Extraocular movements intact.      Conjunctiva/sclera: " Conjunctivae normal.      Pupils: Pupils are equal, round, and reactive to light.   Cardiovascular:      Rate and Rhythm: Normal rate and regular rhythm.      Pulses: Normal pulses.      Heart sounds: Normal heart sounds.   Pulmonary:      Effort: Pulmonary effort is normal.      Breath sounds: Normal breath sounds.   Musculoskeletal:      Cervical back: Normal range of motion and neck supple.   Feet:      Comments:      Neurological:      Mental Status: He is alert.         Procedures      Assessment/Plan:   Diagnoses and all orders for this visit:    1. Screening for malignant neoplasm of colon (Primary)  Assessment & Plan:  We will set patient up for colonoscopy.    Orders:  -     Ambulatory Referral For Screening Colonoscopy    2. Primary insomnia  Assessment & Plan:  Ambien.  House bill 1.  He is aware that long-term use this medicine can lead to memory loss.    Orders:  -     traMADol (ULTRAM) 50 MG tablet; Take 1 tablet by mouth Every 12 (Twelve) Hours As Needed for Moderate Pain.  Dispense: 60 tablet; Refill: 2  -     zolpidem CR (Ambien CR) 12.5 MG CR tablet; Take 1 tablet by mouth At Night As Needed for Sleep.  Dispense: 30 tablet; Refill: 2    3. Chronic midline low back pain without sciatica  Assessment & Plan:  Tramadol as patient cannot take NSAIDs.  House bill 1    Orders:  -     traMADol (ULTRAM) 50 MG tablet; Take 1 tablet by mouth Every 12 (Twelve) Hours As Needed for Moderate Pain.  Dispense: 60 tablet; Refill: 2    4. Hyperlipidemia, mixed  Assessment & Plan:  Blood work in 3 months    Orders:  -     traMADol (ULTRAM) 50 MG tablet; Take 1 tablet by mouth Every 12 (Twelve) Hours As Needed for Moderate Pain.  Dispense: 60 tablet; Refill: 2    5. Acquired hypothyroidism  Assessment & Plan:  Blood work in 3 months    Orders:  -     traMADol (ULTRAM) 50 MG tablet; Take 1 tablet by mouth Every 12 (Twelve) Hours As Needed for Moderate Pain.  Dispense: 60 tablet; Refill: 2    6. Chronic kidney disease,  stage 3a  Assessment & Plan:  Patient is instructed to not take any NSAIDs.  Medicines as directed.  Stay well-hydrated.      Orders:  -     traMADol (ULTRAM) 50 MG tablet; Take 1 tablet by mouth Every 12 (Twelve) Hours As Needed for Moderate Pain.  Dispense: 60 tablet; Refill: 2    7. Cellulitis of chest wall  -     traMADol (ULTRAM) 50 MG tablet; Take 1 tablet by mouth Every 12 (Twelve) Hours As Needed for Moderate Pain.  Dispense: 60 tablet; Refill: 2    8. Hyperglycemia  -     traMADol (ULTRAM) 50 MG tablet; Take 1 tablet by mouth Every 12 (Twelve) Hours As Needed for Moderate Pain.  Dispense: 60 tablet; Refill: 2    9. Coronary artery disease involving native heart without angina pectoris, unspecified vessel or lesion type  Assessment & Plan:  Aggressive risk factor modification    Orders:  -     traMADol (ULTRAM) 50 MG tablet; Take 1 tablet by mouth Every 12 (Twelve) Hours As Needed for Moderate Pain.  Dispense: 60 tablet; Refill: 2             Follow Up:   Return in about 3 months (around 6/27/2024) for Annual physical, Bloodwork 1 week prior to next appointment.      Mina Story MD  [facility] [place] [facility]

## 2024-04-08 ENCOUNTER — PREP FOR SURGERY (OUTPATIENT)
Dept: OTHER | Facility: HOSPITAL | Age: 74
End: 2024-04-08
Payer: MEDICARE

## 2024-04-08 DIAGNOSIS — I25.119 ATHEROSCLEROSIS OF NATIVE CORONARY ARTERY WITH ANGINA PECTORIS, UNSPECIFIED WHETHER NATIVE OR TRANSPLANTED HEART: ICD-10-CM

## 2024-04-08 DIAGNOSIS — Z12.11 SPECIAL SCREENING FOR MALIGNANT NEOPLASMS, COLON: Primary | ICD-10-CM

## 2024-04-08 DIAGNOSIS — I25.10 DISEASE OF CARDIOVASCULAR SYSTEM: ICD-10-CM

## 2024-04-08 DIAGNOSIS — Z95.1 S/P CABG X 2: ICD-10-CM

## 2024-04-10 PROBLEM — I25.119 ATHEROSCLEROSIS OF NATIVE CORONARY ARTERY WITH ANGINA PECTORIS: Status: ACTIVE | Noted: 2024-04-08

## 2024-04-10 PROBLEM — Z12.11 SPECIAL SCREENING FOR MALIGNANT NEOPLASMS, COLON: Status: ACTIVE | Noted: 2024-04-08

## 2024-04-10 PROBLEM — I25.10 DISEASE OF CARDIOVASCULAR SYSTEM: Status: ACTIVE | Noted: 2024-04-08

## 2024-04-16 RX ORDER — FLUTICASONE PROPIONATE 50 MCG
SPRAY, SUSPENSION (ML) NASAL
Qty: 48 G | Refills: 1 | Status: SHIPPED | OUTPATIENT
Start: 2024-04-16

## 2024-04-16 NOTE — TELEPHONE ENCOUNTER
Rx Refill Note    Requested Prescriptions     Pending Prescriptions Disp Refills    fluticasone (FLONASE) 50 MCG/ACT nasal spray [Pharmacy Med Name: FLUTICASONE 50MCG NASAL SP (120) RX] 48 g 1     Sig: USE 2 SPRAYS IN EACH NOSTRIL AS DIRECTED DAILY        Last office visit with prescribing clinician: 3/27/2024      Next office visit with prescribing clinician: 6/26/2024   Last labs:   Last refill: n/a   Pharmacy (be sure to add in Epic). correct

## 2024-04-22 DIAGNOSIS — E03.9 HYPOTHYROIDISM, UNSPECIFIED TYPE: ICD-10-CM

## 2024-04-22 RX ORDER — LIOTHYRONINE SODIUM 5 UG/1
TABLET ORAL
Qty: 180 TABLET | Refills: 0 | Status: SHIPPED | OUTPATIENT
Start: 2024-04-22

## 2024-05-04 DIAGNOSIS — K21.9 GASTROESOPHAGEAL REFLUX DISEASE WITHOUT ESOPHAGITIS: ICD-10-CM

## 2024-05-06 ENCOUNTER — TELEPHONE (OUTPATIENT)
Dept: GASTROENTEROLOGY | Facility: CLINIC | Age: 74
End: 2024-05-06
Payer: MEDICARE

## 2024-05-06 DIAGNOSIS — K21.9 GASTROESOPHAGEAL REFLUX DISEASE WITHOUT ESOPHAGITIS: ICD-10-CM

## 2024-05-06 RX ORDER — OMEPRAZOLE 40 MG/1
40 CAPSULE, DELAYED RELEASE ORAL
Qty: 60 CAPSULE | Refills: 0 | Status: SHIPPED | OUTPATIENT
Start: 2024-05-06

## 2024-05-06 RX ORDER — OMEPRAZOLE 40 MG/1
40 CAPSULE, DELAYED RELEASE ORAL
Qty: 180 CAPSULE | OUTPATIENT
Start: 2024-05-06

## 2024-05-07 ENCOUNTER — TELEPHONE (OUTPATIENT)
Dept: GASTROENTEROLOGY | Facility: CLINIC | Age: 74
End: 2024-05-07

## 2024-05-07 NOTE — TELEPHONE ENCOUNTER
Caller: Kavon Waters Jr.    Relationship to patient: Self    Best call back number: [phone]    Patient is needing: PATIENT CALLED NEEDS TO CANCEL PROCEDURE ON 6-13-24 WITH DR ROBIN ALONG WITH PAT ON 6-6-24

## 2024-05-28 RX ORDER — LOSARTAN POTASSIUM 100 MG/1
100 TABLET ORAL DAILY
Qty: 30 TABLET | Refills: 0 | Status: SHIPPED | OUTPATIENT
Start: 2024-05-28

## 2024-05-28 RX ORDER — LEVOTHYROXINE SODIUM 0.05 MG/1
50 TABLET ORAL DAILY
Qty: 90 TABLET | OUTPATIENT
Start: 2024-05-28

## 2024-05-28 RX ORDER — LOSARTAN POTASSIUM 100 MG/1
100 TABLET ORAL DAILY
Qty: 90 TABLET | OUTPATIENT
Start: 2024-05-28

## 2024-05-28 RX ORDER — LEVOTHYROXINE SODIUM 0.05 MG/1
50 TABLET ORAL DAILY
Qty: 30 TABLET | Refills: 0 | Status: SHIPPED | OUTPATIENT
Start: 2024-05-28

## 2024-05-31 RX ORDER — CETIRIZINE HYDROCHLORIDE 10 MG/1
TABLET ORAL
Qty: 90 TABLET | Refills: 0 | Status: SHIPPED | OUTPATIENT
Start: 2024-05-31

## 2024-05-31 NOTE — TELEPHONE ENCOUNTER
Rx Refill Note    Requested Prescriptions     Pending Prescriptions Disp Refills    cetirizine (zyrTEC) 10 MG tablet [Pharmacy Med Name: CETIRIZINE 10MG TABLETS] 90 tablet 0     Sig: TAKE 1 TABLET BY MOUTH EVERY DAY        Last office visit with prescribing clinician: 3/27/2024      Next office visit with prescribing clinician: 6/26/2024   Last labs:   Last refill: needs   Pharmacy (be sure to add in Epic). correct

## 2024-06-13 DIAGNOSIS — K21.9 GASTROESOPHAGEAL REFLUX DISEASE WITHOUT ESOPHAGITIS: ICD-10-CM

## 2024-06-13 DIAGNOSIS — I73.9 PAD (PERIPHERAL ARTERY DISEASE): ICD-10-CM

## 2024-06-13 RX ORDER — OMEPRAZOLE 40 MG/1
40 CAPSULE, DELAYED RELEASE ORAL
Qty: 180 CAPSULE | Refills: 0 | Status: SHIPPED | OUTPATIENT
Start: 2024-06-13

## 2024-06-13 RX ORDER — CLOPIDOGREL BISULFATE 75 MG/1
TABLET ORAL
Qty: 90 TABLET | Refills: 0 | Status: SHIPPED | OUTPATIENT
Start: 2024-06-13

## 2024-06-13 NOTE — TELEPHONE ENCOUNTER
Rx Refill Note    Requested Prescriptions     Pending Prescriptions Disp Refills    clopidogrel (PLAVIX) 75 MG tablet [Pharmacy Med Name: CLOPIDOGREL 75MG TABLETS] 90 tablet 0     Sig: TAKE 1 TABLET BY MOUTH EVERY DAY    omeprazole (priLOSEC) 40 MG capsule [Pharmacy Med Name: OMEPRAZOLE 40MG CAPSULES] 180 capsule 0     Sig: TAKE 1 CAPSULE BY MOUTH TWICE DAILY BEFORE MEALS        Last office visit with prescribing clinician: 3/27/2024      Next office visit with prescribing clinician: 7/5/2024   Last labs:   Last refill: needs   Pharmacy (be sure to add in Epic). correct

## 2024-06-14 ENCOUNTER — TELEPHONE (OUTPATIENT)
Dept: FAMILY MEDICINE CLINIC | Facility: CLINIC | Age: 74
End: 2024-06-14
Payer: MEDICARE

## 2024-06-14 NOTE — TELEPHONE ENCOUNTER
Caller: Kavon Waters Jr.    Relationship: Self    Best call back number: [phone]     Which medication are you concerned about: omeprazole (priLOSEC) 40 MG capsule     What are your concerns: PATIENT STATES THIS MEDICATION IS NEEDING A PRIOR AUTHORIZATION.

## 2024-06-28 ENCOUNTER — LAB (OUTPATIENT)
Dept: FAMILY MEDICINE CLINIC | Facility: CLINIC | Age: 74
End: 2024-06-28
Payer: MEDICARE

## 2024-06-28 DIAGNOSIS — F51.01 PRIMARY INSOMNIA: ICD-10-CM

## 2024-06-28 DIAGNOSIS — Z79.899 ENCOUNTER FOR LONG-TERM (CURRENT) USE OF OTHER MEDICATIONS: Primary | ICD-10-CM

## 2024-06-28 PROCEDURE — 36415 COLL VENOUS BLD VENIPUNCTURE: CPT | Performed by: FAMILY MEDICINE

## 2024-06-28 RX ORDER — ZOLPIDEM TARTRATE 12.5 MG/1
12.5 TABLET, FILM COATED, EXTENDED RELEASE ORAL NIGHTLY PRN
Qty: 30 TABLET | Refills: 2 | Status: SHIPPED | OUTPATIENT
Start: 2024-06-28

## 2024-06-28 NOTE — TELEPHONE ENCOUNTER
Incoming Refill Request      Medication requested (name and dose): AMBIEN CR 12.5MG     Pharm[facility] where [facility] sent[facility]     Additional details provided by patient: PT STATES HE IS OUT OF MEDICATION HE SEES DR ELLIS 7/5    Best call back number: [phone]    Does the patient have less than a 3 day supply:  [x] Yes  [] No    Salma Haque Rep  06/28/24, 08:41 EDT

## 2024-06-29 LAB
ALBUMIN SERPL-MCNC: 4.3 G/DL (ref 3.8–4.8)
ALP SERPL-CCNC: 100 IU/L (ref 44–121)
ALT SERPL-CCNC: 14 IU/L (ref 0–44)
AST SERPL-CCNC: 18 IU/L (ref 0–40)
BASOPHILS # BLD AUTO: 0.1 X10E3/UL (ref 0–0.2)
BASOPHILS NFR BLD AUTO: 2 %
BILIRUB SERPL-MCNC: 0.4 MG/DL (ref 0–1.2)
BUN SERPL-MCNC: 26 MG/DL (ref 8–27)
BUN/CREAT SERPL: 20 (ref 10–24)
CALCIUM SERPL-MCNC: 9.9 MG/DL (ref 8.6–10.2)
CHLORIDE SERPL-SCNC: 103 MMOL/L (ref 96–106)
CHOLEST SERPL-MCNC: 186 MG/DL (ref 100–199)
CK SERPL-CCNC: 47 U/L (ref 41–331)
CO2 SERPL-SCNC: 21 MMOL/L (ref 20–29)
CREAT SERPL-MCNC: 1.3 MG/DL (ref 0.76–1.27)
EGFRCR SERPLBLD CKD-EPI 2021: 58 ML/MIN/1.73
EOSINOPHIL # BLD AUTO: 0.4 X10E3/UL (ref 0–0.4)
EOSINOPHIL NFR BLD AUTO: 9 %
ERYTHROCYTE [DISTWIDTH] IN BLOOD BY AUTOMATED COUNT: 12.5 % (ref 11.6–15.4)
GLOBULIN SER CALC-MCNC: 2.4 G/DL (ref 1.5–4.5)
GLUCOSE SERPL-MCNC: 105 MG/DL (ref 70–99)
HBA1C MFR BLD: 5.8 % (ref 4.8–5.6)
HCT VFR BLD AUTO: 36.7 % (ref 37.5–51)
HDLC SERPL-MCNC: 48 MG/DL
HGB BLD-MCNC: 12.2 G/DL (ref 13–17.7)
IMM GRANULOCYTES # BLD AUTO: 0 X10E3/UL (ref 0–0.1)
IMM GRANULOCYTES NFR BLD AUTO: 0 %
LDLC SERPL CALC-MCNC: 113 MG/DL (ref 0–99)
LYMPHOCYTES # BLD AUTO: 1.5 X10E3/UL (ref 0.7–3.1)
LYMPHOCYTES NFR BLD AUTO: 30 %
MCH RBC QN AUTO: 31.5 PG (ref 26.6–33)
MCHC RBC AUTO-ENTMCNC: 33.2 G/DL (ref 31.5–35.7)
MCV RBC AUTO: 95 FL (ref 79–97)
MONOCYTES # BLD AUTO: 0.6 X10E3/UL (ref 0.1–0.9)
MONOCYTES NFR BLD AUTO: 11 %
NEUTROPHILS # BLD AUTO: 2.5 X10E3/UL (ref 1.4–7)
NEUTROPHILS NFR BLD AUTO: 48 %
PLATELET # BLD AUTO: 180 X10E3/UL (ref 150–450)
POTASSIUM SERPL-SCNC: 4.5 MMOL/L (ref 3.5–5.2)
PROT SERPL-MCNC: 6.7 G/DL (ref 6–8.5)
RBC # BLD AUTO: 3.87 X10E6/UL (ref 4.14–5.8)
SODIUM SERPL-SCNC: 137 MMOL/L (ref 134–144)
TRIGL SERPL-MCNC: 141 MG/DL (ref 0–149)
TSH SERPL DL<=0.005 MIU/L-ACNC: 2.62 UIU/ML (ref 0.45–4.5)
VLDLC SERPL CALC-MCNC: 25 MG/DL (ref 5–40)
WBC # BLD AUTO: 5.1 X10E3/UL (ref 3.4–10.8)

## 2024-07-01 RX ORDER — LEVOTHYROXINE SODIUM 0.05 MG/1
50 TABLET ORAL DAILY
Qty: 30 TABLET | Refills: 0 | Status: SHIPPED | OUTPATIENT
Start: 2024-07-01

## 2024-07-05 ENCOUNTER — OFFICE VISIT (OUTPATIENT)
Dept: FAMILY MEDICINE CLINIC | Facility: CLINIC | Age: 74
End: 2024-07-05
Payer: MEDICARE

## 2024-07-05 VITALS
BODY MASS INDEX: 28.27 KG/M2 | DIASTOLIC BLOOD PRESSURE: 82 MMHG | SYSTOLIC BLOOD PRESSURE: 128 MMHG | OXYGEN SATURATION: 98 % | HEART RATE: 72 BPM | WEIGHT: 208.7 LBS | HEIGHT: 72 IN | RESPIRATION RATE: 16 BRPM

## 2024-07-05 DIAGNOSIS — K59.00 CONSTIPATION, UNSPECIFIED CONSTIPATION TYPE: ICD-10-CM

## 2024-07-05 DIAGNOSIS — M54.50 CHRONIC MIDLINE LOW BACK PAIN WITHOUT SCIATICA: ICD-10-CM

## 2024-07-05 DIAGNOSIS — E03.9 ACQUIRED HYPOTHYROIDISM: ICD-10-CM

## 2024-07-05 DIAGNOSIS — I25.10 CORONARY ARTERY DISEASE INVOLVING NATIVE HEART WITHOUT ANGINA PECTORIS, UNSPECIFIED VESSEL OR LESION TYPE: ICD-10-CM

## 2024-07-05 DIAGNOSIS — R73.9 HYPERGLYCEMIA: ICD-10-CM

## 2024-07-05 DIAGNOSIS — I10 ESSENTIAL HYPERTENSION: ICD-10-CM

## 2024-07-05 DIAGNOSIS — F51.01 PRIMARY INSOMNIA: ICD-10-CM

## 2024-07-05 DIAGNOSIS — I25.10 CORONARY ARTERY DISEASE INVOLVING NATIVE CORONARY ARTERY OF NATIVE HEART WITHOUT ANGINA PECTORIS: Primary | ICD-10-CM

## 2024-07-05 DIAGNOSIS — N18.31 CHRONIC KIDNEY DISEASE, STAGE 3A: ICD-10-CM

## 2024-07-05 DIAGNOSIS — G89.29 CHRONIC MIDLINE LOW BACK PAIN WITHOUT SCIATICA: ICD-10-CM

## 2024-07-05 DIAGNOSIS — L03.313 CELLULITIS OF CHEST WALL: ICD-10-CM

## 2024-07-05 DIAGNOSIS — E78.2 HYPERLIPIDEMIA, MIXED: ICD-10-CM

## 2024-07-05 RX ORDER — TRAMADOL HYDROCHLORIDE 50 MG/1
50 TABLET ORAL EVERY 12 HOURS PRN
Qty: 60 TABLET | Refills: 2 | Status: SHIPPED | OUTPATIENT
Start: 2024-07-05

## 2024-07-05 RX ORDER — LUBIPROSTONE 24 UG/1
24 CAPSULE ORAL
Qty: 180 CAPSULE | Refills: 1 | Status: SHIPPED | OUTPATIENT
Start: 2024-07-05

## 2024-07-05 NOTE — PROGRESS NOTES
Patient Name: Kavon Waters Jr.  : 1950   MRN: [id number]     Chief Complaint:    Chief Complaint   Patient presents with    Follow-up       History of Present Illness: Kavon Waters Jr. is a 73 y.o. male who is here today for follow up.  HPI        Review of Systems:   Review of Systems   Constitutional: Negative.    HENT: Negative.     Eyes: Negative.    Respiratory: Negative.     Cardiovascular: Negative.    Gastrointestinal: Negative.    Neurological: Negative.         Past Medical History:   Past Medical History:   Diagnosis Date    Acquired hypothyroidism     Allergies     Arrhythmia     Arthritis     Atrial fibrillation     AV block, 1st degree     Borderline diabetes     BPH associated with nocturia     Chronic idiopathic constipation     CKD (chronic kidney disease), stage III     Constipation     Coronary artery disease involving native coronary artery of native heart without angina pectoris     Depression     Disease of thyroid gland     Eczema     Essential hypertension     Full dentures     GERD (gastroesophageal reflux disease)     H/O heart artery stent     X 7    High risk medication use     Ischemic cardiomyopathy     Long term (current) use of anticoagulants     Low sodium levels     Lower back pain     Mixed hyperlipidemia     PAUL (obstructive sleep apnea)     NO CPAP USE    Paroxysmal A-fib     Primary osteoarthritis involving multiple joints     Prostate cancer     HAD 43 RADIATION TREATMENTS.    Prostatic hypertrophy     Benign    RBBB     Recurrent major depressive disorder, in full remission     Seasonal allergies     Skin cancer     Stroke     AFFECTED RIGHT ARM/LEG.  PT DENIES RESIDUAL DEFICITS.    Thyroid disease     Wears glasses        Past Surgical History:   Past Surgical History:   Procedure Laterality Date    CARDIAC CATHETERIZATION      CARDIAC CATHETERIZATION N/A 2018    Procedure: Left Heart Cath;  Surgeon: Moshe Ahn MD;  Location: [facility]  INVASIVE LOCATION;  Service: Cardiovascular    CHOLECYSTECTOMY      CIRCUMCISION      COLONOSCOPY  2013    CORONARY ARTERY BYPASS GRAFT N/A 2018    Procedure: MEDIASTERNOTOMY CORONARY ARTERY BYPASS GRAFT X2 UTILIZING LEFT INTERNAL MAMMARY ARTERY, EVH LEFT GREATER SAPHENOUS VEIN;  Surgeon: Rakesh Mittal MD;  Location: [facility] OR;  Service: Cardiothoracic    CORONARY STENT PLACEMENT      SKIN CANCER EXCISION         Family History:   Family History   Problem Relation Age of Onset    Kidney failure Mother         COMPLICATIONS OF DIALYSIS       Social History:   Social History     Socioeconomic History    Marital status:      Spouse name: Mara    Number of children: 2    Highest education level: 9th grade   Tobacco Use    Smoking status: Former     Current packs/day: 0.00     Average packs/day: 1 pack/day for 23.3 years (23.3 ttl pk-yrs)     Types: Cigarettes     Start date:      Quit date: 1988     Years since quittin.2    Smokeless tobacco: Never    Tobacco comments:     QUIT 30 YEARS AGO   Vaping Use    Vaping status: Never Used   Substance and Sexual Activity    Alcohol use: No    Drug use: No    Sexual activity: Not Currently       Medications:     Current Outpatient Medications:     fluticasone (CUTIVATE) 0.05 % cream, Apply 1 Application topically to the appropriate area as directed Daily., Disp: , Rfl:     fluticasone (FLONASE) 50 MCG/ACT nasal spray, USE 2 SPRAYS IN EACH NOSTRIL AS DIRECTED DAILY, Disp: 48 g, Rfl: 1    ketoconazole (NIZORAL) 2 % shampoo, Apply  topically to the appropriate area as directed 1 (One) Time Per Week., Disp: 120 mL, Rfl: 3    levothyroxine (SYNTHROID, LEVOTHROID) 50 MCG tablet, TAKE 1 TABLET BY MOUTH DAILY, Disp: 30 tablet, Rfl: 0    liothyronine (CYTOMEL) 5 MCG tablet, TAKE 1 TABLET BY MOUTH TWICE DAILY, Disp: 180 tablet, Rfl: 0    losartan (COZAAR) 100 MG tablet, TAKE 1 TABLET BY MOUTH DAILY, Disp: 30 tablet, Rfl: 0    lubiprostone  "(AMITIZA) 24 MCG capsule, Take 1 capsule by mouth Daily With Breakfast., Disp: 180 capsule, Rfl: 1    methocarbamol (ROBAXIN) 750 MG tablet, Take 1 tablet by mouth 2 (Two) Times a Day., Disp: 180 tablet, Rfl: 1    omeprazole (priLOSEC) 40 MG capsule, TAKE 1 CAPSULE BY MOUTH TWICE DAILY BEFORE MEALS, Disp: 180 capsule, Rfl: 0    Repatha SureClick solution auto-injector SureClick injection, ADMINISTER 1 ML UNDER THE SKIN EVERY 14 DAYS AS DIRECTED, Disp: 2 mL, Rfl: 5    traMADol (ULTRAM) 50 MG tablet, Take 1 tablet by mouth Every 12 (Twelve) Hours As Needed for Moderate Pain., Disp: 60 tablet, Rfl: 2    zolpidem CR (Ambien CR) 12.5 MG CR tablet, Take 1 tablet by mouth At Night As Needed for Sleep., Disp: 30 tablet, Rfl: 2    aspirin 81 MG chewable tablet, Chew 1 tablet Daily., Disp: , Rfl:     bicalutamide (CASODEX) 50 MG chemo tablet, Take 1 tablet by mouth Daily., Disp: , Rfl:     cetirizine (zyrTEC) 10 MG tablet, TAKE 1 TABLET BY MOUTH EVERY DAY, Disp: 90 tablet, Rfl: 0    clopidogrel (PLAVIX) 75 MG tablet, TAKE 1 TABLET BY MOUTH EVERY DAY, Disp: 90 tablet, Rfl: 0    Allergies:   Allergies   Allergen Reactions    Ibuprofen Other (See Comments)     Instructed not to take due to his kidneys         Physical Exam:  Vital Signs:   Vitals:    07/05/24 0943   BP: 128/82   BP Location: Left arm   Patient Position: Sitting   Cuff Size: Adult   Pulse: 72   Resp: 16   SpO2: 98%   Weight: 94.7 kg (208 lb 11.2 oz)   Height: 182.9 cm (72.01\")     Body mass index is 28.3 kg/m².     Physical Exam  Vitals and nursing note reviewed.   Constitutional:       Appearance: Normal appearance. He is normal weight.   HENT:      Head: Normocephalic and atraumatic.      Right Ear: Tympanic membrane, ear canal and external ear normal.      Left Ear: Tympanic membrane, ear canal and external ear normal.      Nose: Nose normal.      Mouth/Throat:      Mouth: Mucous membranes are dry.      Pharynx: Oropharynx is clear.   Eyes:      Extraocular " Movements: Extraocular movements intact.      Conjunctiva/sclera: Conjunctivae normal.      Pupils: Pupils are equal, round, and reactive to light.   Cardiovascular:      Rate and Rhythm: Normal rate and regular rhythm.      Pulses: Normal pulses.      Heart sounds: Normal heart sounds.   Pulmonary:      Effort: Pulmonary effort is normal.      Breath sounds: Normal breath sounds.   Musculoskeletal:      Cervical back: Normal range of motion and neck supple.   Feet:      Comments:      Neurological:      Mental Status: He is alert.         Procedures      Assessment/Plan:   Diagnoses and all orders for this visit:    1. Coronary artery disease involving native coronary artery of native heart without angina pectoris (Primary)  Assessment & Plan:  Aggressive risk factor modification    Orders:  -     Hemoglobin A1c; Future  -     Vitamin B12; Future  -     Vitamin D,25-Hydroxy; Future  -     Lipid Panel; Future  -     Comprehensive Metabolic Panel; Future  -     TSH Rfx On Abnormal To Free T4; Future  -     CBC & Differential; Future    2. Essential hypertension  Assessment & Plan:  Discussed with patient to monitor their blood pressure and if systolic blood pressure goes above 140 or diastolic is above 90 to return to clinic.  Take medicines as directed, call for any problems, patient not having or any worrisome symptoms.        Orders:  -     Hemoglobin A1c; Future  -     Vitamin B12; Future  -     Vitamin D,25-Hydroxy; Future  -     Lipid Panel; Future  -     Comprehensive Metabolic Panel; Future  -     TSH Rfx On Abnormal To Free T4; Future  -     CBC & Differential; Future    3. Hyperlipidemia, mixed  Assessment & Plan:  HDL 48.  .  Patient on Repatha.    Orders:  -     Hemoglobin A1c; Future  -     Vitamin B12; Future  -     Vitamin D,25-Hydroxy; Future  -     Lipid Panel; Future  -     Comprehensive Metabolic Panel; Future  -     TSH Rfx On Abnormal To Free T4; Future  -     CBC & Differential; Future  -      traMADol (ULTRAM) 50 MG tablet; Take 1 tablet by mouth Every 12 (Twelve) Hours As Needed for Moderate Pain.  Dispense: 60 tablet; Refill: 2    4. Acquired hypothyroidism  Assessment & Plan:  TSH is 2.620.  Recheck in 6 months.    Orders:  -     Hemoglobin A1c; Future  -     Vitamin B12; Future  -     Vitamin D,25-Hydroxy; Future  -     Lipid Panel; Future  -     Comprehensive Metabolic Panel; Future  -     TSH Rfx On Abnormal To Free T4; Future  -     CBC & Differential; Future  -     traMADol (ULTRAM) 50 MG tablet; Take 1 tablet by mouth Every 12 (Twelve) Hours As Needed for Moderate Pain.  Dispense: 60 tablet; Refill: 2    5. Hyperglycemia  Assessment & Plan:  A1c is 5.8.  Recheck in 6 months.    Orders:  -     Hemoglobin A1c; Future  -     Vitamin B12; Future  -     Vitamin D,25-Hydroxy; Future  -     Lipid Panel; Future  -     Comprehensive Metabolic Panel; Future  -     TSH Rfx On Abnormal To Free T4; Future  -     CBC & Differential; Future  -     traMADol (ULTRAM) 50 MG tablet; Take 1 tablet by mouth Every 12 (Twelve) Hours As Needed for Moderate Pain.  Dispense: 60 tablet; Refill: 2    6. Chronic kidney disease, stage 3a  Assessment & Plan:  GFR is 58.Patient is instructed to not take any NSAIDs.  Medicines as directed.  Stay well-hydrated.      Orders:  -     Hemoglobin A1c; Future  -     Vitamin B12; Future  -     Vitamin D,25-Hydroxy; Future  -     Lipid Panel; Future  -     Comprehensive Metabolic Panel; Future  -     TSH Rfx On Abnormal To Free T4; Future  -     CBC & Differential; Future  -     traMADol (ULTRAM) 50 MG tablet; Take 1 tablet by mouth Every 12 (Twelve) Hours As Needed for Moderate Pain.  Dispense: 60 tablet; Refill: 2    7. Primary insomnia  Assessment & Plan:  Well on meds.  Will follow.    Orders:  -     Hemoglobin A1c; Future  -     Vitamin B12; Future  -     Vitamin D,25-Hydroxy; Future  -     Lipid Panel; Future  -     Comprehensive Metabolic Panel; Future  -     TSH Rfx On  Abnormal To Free T4; Future  -     CBC & Differential; Future  -     traMADol (ULTRAM) 50 MG tablet; Take 1 tablet by mouth Every 12 (Twelve) Hours As Needed for Moderate Pain.  Dispense: 60 tablet; Refill: 2    8. Chronic midline low back pain without sciatica  -     traMADol (ULTRAM) 50 MG tablet; Take 1 tablet by mouth Every 12 (Twelve) Hours As Needed for Moderate Pain.  Dispense: 60 tablet; Refill: 2    9. Cellulitis of chest wall  -     traMADol (ULTRAM) 50 MG tablet; Take 1 tablet by mouth Every 12 (Twelve) Hours As Needed for Moderate Pain.  Dispense: 60 tablet; Refill: 2    10. Coronary artery disease involving native heart without angina pectoris, unspecified vessel or lesion type  Assessment & Plan:  Aggressive risk factor modification    Orders:  -     traMADol (ULTRAM) 50 MG tablet; Take 1 tablet by mouth Every 12 (Twelve) Hours As Needed for Moderate Pain.  Dispense: 60 tablet; Refill: 2    11. Constipation, unspecified constipation type  -     lubiprostone (AMITIZA) 24 MCG capsule; Take 1 capsule by mouth Daily With Breakfast.  Dispense: 180 capsule; Refill: 1             Follow Up:   Return in about 3 months (around 10/5/2024) for Recheck.      Mina Story MD  [facility]

## 2024-07-08 RX ORDER — LOSARTAN POTASSIUM 100 MG/1
100 TABLET ORAL DAILY
Qty: 90 TABLET | Refills: 1 | Status: SHIPPED | OUTPATIENT
Start: 2024-07-08

## 2024-07-19 ENCOUNTER — OFFICE VISIT (OUTPATIENT)
Dept: FAMILY MEDICINE CLINIC | Facility: CLINIC | Age: 74
End: 2024-07-19
Payer: MEDICARE

## 2024-07-19 DIAGNOSIS — E78.2 HYPERLIPIDEMIA, MIXED: ICD-10-CM

## 2024-07-19 DIAGNOSIS — R73.9 HYPERGLYCEMIA: ICD-10-CM

## 2024-07-19 DIAGNOSIS — I10 ESSENTIAL HYPERTENSION: Primary | ICD-10-CM

## 2024-07-19 DIAGNOSIS — I25.119 CORONARY ARTERY DISEASE INVOLVING NATIVE CORONARY ARTERY OF NATIVE HEART WITH ANGINA PECTORIS: ICD-10-CM

## 2024-07-19 DIAGNOSIS — N18.31 CHRONIC KIDNEY DISEASE, STAGE 3A: ICD-10-CM

## 2024-07-19 PROCEDURE — 99214 OFFICE O/P EST MOD 30 MIN: CPT | Performed by: FAMILY MEDICINE

## 2024-07-19 PROCEDURE — G2211 COMPLEX E/M VISIT ADD ON: HCPCS | Performed by: FAMILY MEDICINE

## 2024-07-19 PROCEDURE — 1126F AMNT PAIN NOTED NONE PRSNT: CPT | Performed by: FAMILY MEDICINE

## 2024-07-22 VITALS
HEIGHT: 72 IN | SYSTOLIC BLOOD PRESSURE: 180 MMHG | BODY MASS INDEX: 27.58 KG/M2 | DIASTOLIC BLOOD PRESSURE: 94 MMHG | WEIGHT: 203.6 LBS | RESPIRATION RATE: 16 BRPM | OXYGEN SATURATION: 98 % | HEART RATE: 94 BPM

## 2024-07-22 NOTE — ASSESSMENT & PLAN NOTE
Discussed with patient to monitor their blood pressure and if systolic blood pressure goes above 140 or diastolic is above 90 to return to clinic.  Take medicines as directed, call for any problems, patient not having or any worrisome symptoms.        Blood pressure up today.  We are going to add amlodipine 5 mg.  Come back in 3 weeks.

## 2024-07-22 NOTE — PROGRESS NOTES
Patient Name: Kavon Waters Jr.  : 1950   MRN: [id number]     Chief Complaint:  No chief complaint on file.      History of Present Illness: Kavon Waters Jr. is a 73 y.o. male who is here today for follow up on BP and BG  HPI        Review of Systems:   Review of Systems   Constitutional: Negative.    HENT: Negative.     Eyes: Negative.    Respiratory: Negative.     Cardiovascular: Negative.    Gastrointestinal: Negative.    Neurological: Negative.         Past Medical History:   Past Medical History:   Diagnosis Date    Acquired hypothyroidism     Allergies     Arrhythmia     Arthritis     Atrial fibrillation     AV block, 1st degree     Borderline diabetes     BPH associated with nocturia     Chronic idiopathic constipation     CKD (chronic kidney disease), stage III     Constipation     Coronary artery disease involving native coronary artery of native heart without angina pectoris     Depression     Disease of thyroid gland     Eczema     Essential hypertension     Full dentures     GERD (gastroesophageal reflux disease)     H/O heart artery stent     X 7    High risk medication use     Ischemic cardiomyopathy     Long term (current) use of anticoagulants     Low sodium levels     Lower back pain     Mixed hyperlipidemia     PAUL (obstructive sleep apnea)     NO CPAP USE    Paroxysmal A-fib     Primary osteoarthritis involving multiple joints     Prostate cancer     HAD 43 RADIATION TREATMENTS.    Prostatic hypertrophy     Benign    RBBB     Recurrent major depressive disorder, in full remission     Seasonal allergies     Skin cancer     Stroke     AFFECTED RIGHT ARM/LEG.  PT DENIES RESIDUAL DEFICITS.    Thyroid disease     Wears glasses        Past Surgical History:   Past Surgical History:   Procedure Laterality Date    CARDIAC CATHETERIZATION      CARDIAC CATHETERIZATION N/A 2018    Procedure: Left Heart Cath;  Surgeon: Moshe Ahn MD;  Location: [facility] CATH INVASIVE LOCATION;   Service: Cardiovascular    CHOLECYSTECTOMY      CIRCUMCISION  2005    COLONOSCOPY  2013    CORONARY ARTERY BYPASS GRAFT N/A 2018    Procedure: MEDIASTERNOTOMY CORONARY ARTERY BYPASS GRAFT X2 UTILIZING LEFT INTERNAL MAMMARY ARTERY, EVH LEFT GREATER SAPHENOUS VEIN;  Surgeon: Rakesh Mittal MD;  Location: [facility];  Service: Cardiothoracic    CORONARY STENT PLACEMENT      SKIN CANCER EXCISION         Family History:   Family History   Problem Relation Age of Onset    Kidney failure Mother         COMPLICATIONS OF DIALYSIS       Social History:   Social History     Socioeconomic History    Marital status:      Spouse name: Mara    Number of children: 2    Highest education level: 9th grade   Tobacco Use    Smoking status: Former     Current packs/day: 0.00     Average packs/day: 1 pack/day for 23.3 years (23.3 ttl pk-yrs)     Types: Cigarettes     Start date:      Quit date: 1988     Years since quittin.3    Smokeless tobacco: Never    Tobacco comments:     QUIT 30 YEARS AGO   Vaping Use    Vaping status: Never Used   Substance and Sexual Activity    Alcohol use: No    Drug use: No    Sexual activity: Not Currently       Medications:     Current Outpatient Medications:     aspirin 81 MG chewable tablet, Chew 1 tablet Daily., Disp: , Rfl:     bicalutamide (CASODEX) 50 MG chemo tablet, Take 1 tablet by mouth Daily., Disp: , Rfl:     cetirizine (zyrTEC) 10 MG tablet, TAKE 1 TABLET BY MOUTH EVERY DAY, Disp: 90 tablet, Rfl: 0    clopidogrel (PLAVIX) 75 MG tablet, TAKE 1 TABLET BY MOUTH EVERY DAY, Disp: 90 tablet, Rfl: 0    fluticasone (CUTIVATE) 0.05 % cream, Apply 1 Application topically to the appropriate area as directed Daily., Disp: , Rfl:     fluticasone (FLONASE) 50 MCG/ACT nasal spray, USE 2 SPRAYS IN EACH NOSTRIL AS DIRECTED DAILY, Disp: 48 g, Rfl: 1    ketoconazole (NIZORAL) 2 % shampoo, Apply  topically to the appropriate area as directed 1 (One) Time Per Week., Disp: 120 mL,  Rfl: 3    levothyroxine (SYNTHROID, LEVOTHROID) 50 MCG tablet, TAKE 1 TABLET BY MOUTH DAILY, Disp: 30 tablet, Rfl: 0    liothyronine (CYTOMEL) 5 MCG tablet, TAKE 1 TABLET BY MOUTH TWICE DAILY, Disp: 180 tablet, Rfl: 0    losartan (COZAAR) 100 MG tablet, TAKE 1 TABLET BY MOUTH DAILY, Disp: 90 tablet, Rfl: 1    lubiprostone (AMITIZA) 24 MCG capsule, Take 1 capsule by mouth Daily With Breakfast., Disp: 180 capsule, Rfl: 1    methocarbamol (ROBAXIN) 750 MG tablet, Take 1 tablet by mouth 2 (Two) Times a Day., Disp: 180 tablet, Rfl: 1    omeprazole (priLOSEC) 40 MG capsule, TAKE 1 CAPSULE BY MOUTH TWICE DAILY BEFORE MEALS, Disp: 180 capsule, Rfl: 0    Repatha SureClick solution auto-injector SureClick injection, ADMINISTER 1 ML UNDER THE SKIN EVERY 14 DAYS AS DIRECTED, Disp: 2 mL, Rfl: 5    traMADol (ULTRAM) 50 MG tablet, Take 1 tablet by mouth Every 12 (Twelve) Hours As Needed for Moderate Pain., Disp: 60 tablet, Rfl: 2    zolpidem CR (Ambien CR) 12.5 MG CR tablet, Take 1 tablet by mouth At Night As Needed for Sleep., Disp: 30 tablet, Rfl: 2    Allergies:   Allergies   Allergen Reactions    Ibuprofen Other (See Comments)     Instructed not to take due to his kidneys         Physical Exam:  Vital Signs: There were no vitals filed for this visit.  There is no height or weight on file to calculate BMI.     Physical Exam  Vitals and nursing note reviewed.   Constitutional:       Appearance: Normal appearance. He is normal weight.   HENT:      Head: Normocephalic and atraumatic.      Right Ear: Tympanic membrane, ear canal and external ear normal.      Left Ear: Tympanic membrane, ear canal and external ear normal.      Nose: Nose normal.      Mouth/Throat:      Mouth: Mucous membranes are dry.      Pharynx: Oropharynx is clear.   Eyes:      Extraocular Movements: Extraocular movements intact.      Conjunctiva/sclera: Conjunctivae normal.      Pupils: Pupils are equal, round, and reactive to light.   Cardiovascular:      Rate  and Rhythm: Normal rate and regular rhythm.      Pulses: Normal pulses.      Heart sounds: Normal heart sounds.   Pulmonary:      Effort: Pulmonary effort is normal.      Breath sounds: Normal breath sounds.   Musculoskeletal:      Cervical back: Normal range of motion and neck supple.   Feet:      Comments:      Neurological:      Mental Status: He is alert.         Procedures      Assessment/Plan:   Diagnoses and all orders for this visit:    1. Essential hypertension (Primary)  Assessment & Plan:  Discussed with patient to monitor their blood pressure and if systolic blood pressure goes above 140 or diastolic is above 90 to return to clinic.  Take medicines as directed, call for any problems, patient not having or any worrisome symptoms.        Blood pressure up today.  We are going to add amlodipine 5 mg.  Come back in 3 weeks.      2. Chronic kidney disease, stage 3a  Assessment & Plan:  Will check a CMP in 2 weeks.      3. Hyperglycemia  Assessment & Plan:  Last A1c was 5.7.  We will follow.      4. Hyperlipidemia, mixed  Assessment & Plan:  Blood work in 2 weeks.  See me in 3 weeks    Orders:  -     Lipid Panel; Future    5. Coronary artery disease involving native coronary artery of native heart with angina pectoris  Assessment & Plan:  Aggressive risk factor modification    Orders:  -     Comprehensive Metabolic Panel; Future  -     Lipid Panel; Future             Follow Up:   No follow-ups on file.      Mina Story MD  [facility]

## 2024-07-24 RX ORDER — LEVOTHYROXINE SODIUM 0.05 MG/1
50 TABLET ORAL DAILY
Qty: 30 TABLET | Refills: 0 | Status: SHIPPED | OUTPATIENT
Start: 2024-07-24

## 2024-07-28 DIAGNOSIS — E03.9 HYPOTHYROIDISM, UNSPECIFIED TYPE: ICD-10-CM

## 2024-07-29 RX ORDER — LIOTHYRONINE SODIUM 5 UG/1
TABLET ORAL
Qty: 60 TABLET | Refills: 0 | Status: SHIPPED | OUTPATIENT
Start: 2024-07-29

## 2024-08-12 ENCOUNTER — LAB (OUTPATIENT)
Dept: FAMILY MEDICINE CLINIC | Facility: CLINIC | Age: 74
End: 2024-08-12
Payer: MEDICARE

## 2024-08-12 DIAGNOSIS — E78.2 HYPERLIPIDEMIA, MIXED: ICD-10-CM

## 2024-08-12 DIAGNOSIS — I25.119 CORONARY ARTERY DISEASE INVOLVING NATIVE CORONARY ARTERY OF NATIVE HEART WITH ANGINA PECTORIS: ICD-10-CM

## 2024-08-12 PROCEDURE — 36415 COLL VENOUS BLD VENIPUNCTURE: CPT | Performed by: FAMILY MEDICINE

## 2024-08-13 LAB
ALBUMIN SERPL-MCNC: 4.3 G/DL (ref 3.8–4.8)
ALP SERPL-CCNC: 105 IU/L (ref 44–121)
ALT SERPL-CCNC: 14 IU/L (ref 0–44)
AST SERPL-CCNC: 20 IU/L (ref 0–40)
BILIRUB SERPL-MCNC: 0.4 MG/DL (ref 0–1.2)
BUN SERPL-MCNC: 27 MG/DL (ref 8–27)
BUN/CREAT SERPL: 20 (ref 10–24)
CALCIUM SERPL-MCNC: 9.7 MG/DL (ref 8.6–10.2)
CHLORIDE SERPL-SCNC: 104 MMOL/L (ref 96–106)
CHOLEST SERPL-MCNC: 192 MG/DL (ref 100–199)
CO2 SERPL-SCNC: 21 MMOL/L (ref 20–29)
CREAT SERPL-MCNC: 1.37 MG/DL (ref 0.76–1.27)
EGFRCR SERPLBLD CKD-EPI 2021: 54 ML/MIN/1.73
GLOBULIN SER CALC-MCNC: 2.5 G/DL (ref 1.5–4.5)
GLUCOSE SERPL-MCNC: 83 MG/DL (ref 70–99)
HDLC SERPL-MCNC: 45 MG/DL
LDLC SERPL CALC-MCNC: 119 MG/DL (ref 0–99)
POTASSIUM SERPL-SCNC: 4.4 MMOL/L (ref 3.5–5.2)
PROT SERPL-MCNC: 6.8 G/DL (ref 6–8.5)
SODIUM SERPL-SCNC: 140 MMOL/L (ref 134–144)
TRIGL SERPL-MCNC: 159 MG/DL (ref 0–149)
VLDLC SERPL CALC-MCNC: 28 MG/DL (ref 5–40)

## 2024-08-15 ENCOUNTER — OFFICE VISIT (OUTPATIENT)
Dept: FAMILY MEDICINE CLINIC | Facility: CLINIC | Age: 74
End: 2024-08-15
Payer: MEDICARE

## 2024-08-15 VITALS
HEIGHT: 72 IN | WEIGHT: 208.7 LBS | SYSTOLIC BLOOD PRESSURE: 130 MMHG | HEART RATE: 72 BPM | BODY MASS INDEX: 28.27 KG/M2 | DIASTOLIC BLOOD PRESSURE: 82 MMHG | OXYGEN SATURATION: 98 % | RESPIRATION RATE: 16 BRPM

## 2024-08-15 DIAGNOSIS — R73.9 HYPERGLYCEMIA: ICD-10-CM

## 2024-08-15 DIAGNOSIS — D64.9 ANEMIA, UNSPECIFIED TYPE: ICD-10-CM

## 2024-08-15 DIAGNOSIS — I10 ESSENTIAL HYPERTENSION: Primary | ICD-10-CM

## 2024-08-15 DIAGNOSIS — I25.119 CORONARY ARTERY DISEASE INVOLVING NATIVE CORONARY ARTERY OF NATIVE HEART WITH ANGINA PECTORIS: ICD-10-CM

## 2024-08-15 DIAGNOSIS — E78.2 HYPERLIPIDEMIA, MIXED: ICD-10-CM

## 2024-08-15 PROCEDURE — 3079F DIAST BP 80-89 MM HG: CPT | Performed by: FAMILY MEDICINE

## 2024-08-15 PROCEDURE — 99214 OFFICE O/P EST MOD 30 MIN: CPT | Performed by: FAMILY MEDICINE

## 2024-08-15 PROCEDURE — 3075F SYST BP GE 130 - 139MM HG: CPT | Performed by: FAMILY MEDICINE

## 2024-08-15 PROCEDURE — 1126F AMNT PAIN NOTED NONE PRSNT: CPT | Performed by: FAMILY MEDICINE

## 2024-08-15 PROCEDURE — G2211 COMPLEX E/M VISIT ADD ON: HCPCS | Performed by: FAMILY MEDICINE

## 2024-08-15 RX ORDER — CETIRIZINE HYDROCHLORIDE 10 MG/1
TABLET ORAL
Qty: 90 TABLET | Refills: 0 | Status: SHIPPED | OUTPATIENT
Start: 2024-08-15

## 2024-08-15 RX ORDER — METHOCARBAMOL 750 MG/1
750 TABLET, FILM COATED ORAL 2 TIMES DAILY
Qty: 180 TABLET | Refills: 1 | Status: SHIPPED | OUTPATIENT
Start: 2024-08-15

## 2024-08-15 NOTE — TELEPHONE ENCOUNTER
Rx Refill Note  Requested Prescriptions     Pending Prescriptions Disp Refills    cetirizine (zyrTEC) 10 MG tablet [Pharmacy Med Name: CETIRIZINE 10MG TABLETS] 90 tablet 0     Sig: TAKE 1 TABLET BY MOUTH EVERY DAY      Last office visit with prescribing clinician: 7/19/2024   Last telemedicine visit with prescribing clinician: Visit date not found   Next office visit with prescribing clinician: 8/15/2024     No protocol available.    Loli Garza MA  08/15/24, 09:19 EDT

## 2024-08-15 NOTE — PROGRESS NOTES
Patient Name: Kavon Waters Jr.  : 1950   MRN: [id number]     Chief Complaint:    Chief Complaint   Patient presents with    Follow-up       History of Present Illness: Kavon Waters Jr. is a 73 y.o. male who is here today for follow up onBP and renalfunction  Follow-up          Review of Systems:   Review of Systems   Constitutional: Negative.    HENT: Negative.     Eyes: Negative.    Respiratory: Negative.     Cardiovascular: Negative.    Gastrointestinal: Negative.    Neurological: Negative.         Past Medical History:   Past Medical History:   Diagnosis Date    Acquired hypothyroidism     Allergies     Arrhythmia     Arthritis     Atrial fibrillation     AV block, 1st degree     Borderline diabetes     BPH associated with nocturia     Chronic idiopathic constipation     CKD (chronic kidney disease), stage III     Constipation     Coronary artery disease involving native coronary artery of native heart without angina pectoris     Depression     Disease of thyroid gland     Eczema     Essential hypertension     Full dentures     GERD (gastroesophageal reflux disease)     H/O heart artery stent     X 7    High risk medication use     Ischemic cardiomyopathy     Long term (current) use of anticoagulants     Low sodium levels     Lower back pain     Mixed hyperlipidemia     PAUL (obstructive sleep apnea)     NO CPAP USE    Paroxysmal A-fib     Primary osteoarthritis involving multiple joints     Prostate cancer     HAD 43 RADIATION TREATMENTS.    Prostatic hypertrophy     Benign    RBBB     Recurrent major depressive disorder, in full remission     Seasonal allergies     Skin cancer     Stroke     AFFECTED RIGHT ARM/LEG.  PT DENIES RESIDUAL DEFICITS.    Thyroid disease     Wears glasses        Past Surgical History:   Past Surgical History:   Procedure Laterality Date    CARDIAC CATHETERIZATION      CARDIAC CATHETERIZATION N/A 2018    Procedure: Left Heart Cath;  Surgeon: Moshe Ahn,  MD;  Location:  [facility];  Service: Cardiovascular    CHOLECYSTECTOMY      CIRCUMCISION  2005    COLONOSCOPY  2013    CORONARY ARTERY BYPASS GRAFT N/A 2018    Procedure: MEDIASTERNOTOMY CORONARY ARTERY BYPASS GRAFT X2 UTILIZING LEFT INTERNAL MAMMARY ARTERY, EVH LEFT GREATER SAPHENOUS VEIN;  Surgeon: Rakesh Mittal MD;  Location: [facility] OR;  Service: Cardiothoracic    CORONARY STENT PLACEMENT      SKIN CANCER EXCISION         Family History:   Family History   Problem Relation Age of Onset    Kidney failure Mother         COMPLICATIONS OF DIALYSIS       Social History:   Social History     Socioeconomic History    Marital status:      Spouse name: Mara    Number of children: 2    Highest education level: 9th grade   Tobacco Use    Smoking status: Former     Current packs/day: 0.00     Average packs/day: 1 pack/day for 23.3 years (23.3 ttl pk-yrs)     Types: Cigarettes     Start date:      Quit date: 1988     Years since quittin.3    Smokeless tobacco: Never    Tobacco comments:     QUIT 30 YEARS AGO   Vaping Use    Vaping status: Never Used   Substance and Sexual Activity    Alcohol use: No    Drug use: No    Sexual activity: Not Currently       Medications:     Current Outpatient Medications:     aspirin 81 MG chewable tablet, Chew 1 tablet Daily., Disp: , Rfl:     bicalutamide (CASODEX) 50 MG chemo tablet, Take 1 tablet by mouth Daily., Disp: , Rfl:     cetirizine (zyrTEC) 10 MG tablet, TAKE 1 TABLET BY MOUTH EVERY DAY, Disp: 90 tablet, Rfl: 0    clopidogrel (PLAVIX) 75 MG tablet, TAKE 1 TABLET BY MOUTH EVERY DAY, Disp: 90 tablet, Rfl: 0    fluticasone (CUTIVATE) 0.05 % cream, Apply 1 Application topically to the appropriate area as directed Daily., Disp: , Rfl:     fluticasone (FLONASE) 50 MCG/ACT nasal spray, USE 2 SPRAYS IN EACH NOSTRIL AS DIRECTED DAILY, Disp: 48 g, Rfl: 1    ketoconazole (NIZORAL) 2 % shampoo, Apply  topically to the appropriate area as  "directed 1 (One) Time Per Week., Disp: 120 mL, Rfl: 3    levothyroxine (SYNTHROID, LEVOTHROID) 50 MCG tablet, TAKE 1 TABLET BY MOUTH DAILY, Disp: 30 tablet, Rfl: 0    liothyronine (CYTOMEL) 5 MCG tablet, TAKE 1 TABLET BY MOUTH TWICE DAILY, Disp: 60 tablet, Rfl: 0    losartan (COZAAR) 100 MG tablet, TAKE 1 TABLET BY MOUTH DAILY, Disp: 90 tablet, Rfl: 1    lubiprostone (AMITIZA) 24 MCG capsule, Take 1 capsule by mouth Daily With Breakfast., Disp: 180 capsule, Rfl: 1    methocarbamol (ROBAXIN) 750 MG tablet, Take 1 tablet by mouth 2 (Two) Times a Day., Disp: 180 tablet, Rfl: 1    omeprazole (priLOSEC) 40 MG capsule, TAKE 1 CAPSULE BY MOUTH TWICE DAILY BEFORE MEALS, Disp: 180 capsule, Rfl: 0    Repatha SureClick solution auto-injector SureClick injection, ADMINISTER 1 ML UNDER THE SKIN EVERY 14 DAYS AS DIRECTED, Disp: 2 mL, Rfl: 5    traMADol (ULTRAM) 50 MG tablet, Take 1 tablet by mouth Every 12 (Twelve) Hours As Needed for Moderate Pain., Disp: 60 tablet, Rfl: 2    zolpidem CR (Ambien CR) 12.5 MG CR tablet, Take 1 tablet by mouth At Night As Needed for Sleep., Disp: 30 tablet, Rfl: 2    Allergies:   Allergies   Allergen Reactions    Ibuprofen Other (See Comments)     Instructed not to take due to his kidneys         Physical Exam:  Vital Signs:   Vitals:    08/15/24 1027   BP: 130/82   BP Location: Left arm   Patient Position: Sitting   Cuff Size: Adult   Pulse: 72   Resp: 16   SpO2: 98%   Weight: 94.7 kg (208 lb 11.2 oz)   Height: 182.9 cm (72.01\")     Body mass index is 28.3 kg/m².     Physical Exam  Vitals and nursing note reviewed.   Constitutional:       Appearance: Normal appearance. He is normal weight.   HENT:      Head: Normocephalic and atraumatic.      Right Ear: Tympanic membrane, ear canal and external ear normal.      Left Ear: Tympanic membrane, ear canal and external ear normal.      Nose: Nose normal.      Mouth/Throat:      Mouth: Mucous membranes are dry.      Pharynx: Oropharynx is clear.   Eyes: "      Extraocular Movements: Extraocular movements intact.      Conjunctiva/sclera: Conjunctivae normal.      Pupils: Pupils are equal, round, and reactive to light.   Cardiovascular:      Rate and Rhythm: Normal rate and regular rhythm.      Pulses: Normal pulses.      Heart sounds: Normal heart sounds.   Pulmonary:      Effort: Pulmonary effort is normal.      Breath sounds: Normal breath sounds.   Musculoskeletal:      Cervical back: Normal range of motion and neck supple.   Feet:      Comments:      Neurological:      Mental Status: He is alert.         Procedures      Assessment/Plan:   Diagnoses and all orders for this visit:    1. Essential hypertension (Primary)  Assessment & Plan:  Discussed with patient to monitor their blood pressure and if systolic blood pressure goes above 140 or diastolic is above 90 to return to clinic.  Take medicines as directed, call for any problems, patient not having or any worrisome symptoms.        Orders:  -     Hemoglobin A1c; Future  -     Lipid Panel; Future  -     Comprehensive Metabolic Panel; Future  -     Ferritin; Future  -     Folate; Future  -     Iron Profile; Future  -     Vitamin B12; Future  -     TSH Rfx On Abnormal To Free T4; Future  -     CBC & Differential; Future    2. Hyperglycemia  Assessment & Plan:  Blood work in 3 months.    Orders:  -     Hemoglobin A1c; Future  -     Lipid Panel; Future  -     Comprehensive Metabolic Panel; Future  -     Ferritin; Future  -     Folate; Future  -     Iron Profile; Future  -     Vitamin B12; Future  -     TSH Rfx On Abnormal To Free T4; Future  -     CBC & Differential; Future    3. Hyperlipidemia, mixed  Assessment & Plan:  The upper limit.  Patient will not take a statin.    Orders:  -     Hemoglobin A1c; Future  -     Lipid Panel; Future  -     Comprehensive Metabolic Panel; Future  -     Ferritin; Future  -     Folate; Future  -     Iron Profile; Future  -     Vitamin B12; Future  -     TSH Rfx On Abnormal To Free  T4; Future  -     CBC & Differential; Future    4. Coronary artery disease involving native coronary artery of native heart with angina pectoris  Assessment & Plan:  Aggressive risk factor modification    Orders:  -     Hemoglobin A1c; Future  -     Lipid Panel; Future  -     Comprehensive Metabolic Panel; Future  -     Ferritin; Future  -     Folate; Future  -     Iron Profile; Future  -     Vitamin B12; Future  -     TSH Rfx On Abnormal To Free T4; Future  -     CBC & Differential; Future    5. Anemia, unspecified type  Assessment & Plan:  Patient is anemic.  No signs or symptoms.  We will recheck in 3 months.  Patient does have a history of prostate cancer.    Orders:  -     Hemoglobin A1c; Future  -     Lipid Panel; Future  -     Comprehensive Metabolic Panel; Future  -     Ferritin; Future  -     Folate; Future  -     Iron Profile; Future  -     Vitamin B12; Future  -     TSH Rfx On Abnormal To Free T4; Future  -     CBC & Differential; Future    Other orders  -     methocarbamol (ROBAXIN) 750 MG tablet; Take 1 tablet by mouth 2 (Two) Times a Day.  Dispense: 180 tablet; Refill: 1             Follow Up:   No follow-ups on file.      Mina Story MD  [facility]

## 2024-08-27 DIAGNOSIS — E03.9 HYPOTHYROIDISM, UNSPECIFIED TYPE: ICD-10-CM

## 2024-08-27 RX ORDER — LIOTHYRONINE SODIUM 5 UG/1
TABLET ORAL
Qty: 60 TABLET | Refills: 0 | Status: SHIPPED | OUTPATIENT
Start: 2024-08-27

## 2024-08-27 RX ORDER — LEVOTHYROXINE SODIUM 50 UG/1
50 TABLET ORAL DAILY
Qty: 30 TABLET | Refills: 0 | Status: SHIPPED | OUTPATIENT
Start: 2024-08-27

## 2024-09-05 DIAGNOSIS — K21.9 GASTROESOPHAGEAL REFLUX DISEASE WITHOUT ESOPHAGITIS: ICD-10-CM

## 2024-09-06 RX ORDER — OMEPRAZOLE 40 MG/1
40 CAPSULE, DELAYED RELEASE ORAL
Qty: 180 CAPSULE | Refills: 0 | Status: SHIPPED | OUTPATIENT
Start: 2024-09-06

## 2024-09-18 ENCOUNTER — OFFICE VISIT (OUTPATIENT)
Dept: FAMILY MEDICINE CLINIC | Facility: CLINIC | Age: 74
End: 2024-09-18
Payer: MEDICARE

## 2024-09-18 VITALS
HEIGHT: 72 IN | BODY MASS INDEX: 28.92 KG/M2 | HEART RATE: 62 BPM | WEIGHT: 213.5 LBS | SYSTOLIC BLOOD PRESSURE: 130 MMHG | OXYGEN SATURATION: 96 % | DIASTOLIC BLOOD PRESSURE: 84 MMHG

## 2024-09-18 DIAGNOSIS — K14.0 TONGUE INFLAMMATION: Primary | ICD-10-CM

## 2024-09-18 PROCEDURE — 3079F DIAST BP 80-89 MM HG: CPT | Performed by: STUDENT IN AN ORGANIZED HEALTH CARE EDUCATION/TRAINING PROGRAM

## 2024-09-18 PROCEDURE — 36415 COLL VENOUS BLD VENIPUNCTURE: CPT | Performed by: STUDENT IN AN ORGANIZED HEALTH CARE EDUCATION/TRAINING PROGRAM

## 2024-09-18 PROCEDURE — 99213 OFFICE O/P EST LOW 20 MIN: CPT | Performed by: STUDENT IN AN ORGANIZED HEALTH CARE EDUCATION/TRAINING PROGRAM

## 2024-09-18 PROCEDURE — 1160F RVW MEDS BY RX/DR IN RCRD: CPT | Performed by: STUDENT IN AN ORGANIZED HEALTH CARE EDUCATION/TRAINING PROGRAM

## 2024-09-18 PROCEDURE — 1126F AMNT PAIN NOTED NONE PRSNT: CPT | Performed by: STUDENT IN AN ORGANIZED HEALTH CARE EDUCATION/TRAINING PROGRAM

## 2024-09-18 PROCEDURE — 3075F SYST BP GE 130 - 139MM HG: CPT | Performed by: STUDENT IN AN ORGANIZED HEALTH CARE EDUCATION/TRAINING PROGRAM

## 2024-09-18 PROCEDURE — 1159F MED LIST DOCD IN RCRD: CPT | Performed by: STUDENT IN AN ORGANIZED HEALTH CARE EDUCATION/TRAINING PROGRAM

## 2024-09-18 RX ORDER — TRIAMCINOLONE ACETONIDE 1 MG/G
1 OINTMENT TOPICAL 2 TIMES DAILY
COMMUNITY
Start: 2024-08-27

## 2024-09-18 RX ORDER — ALFUZOSIN HYDROCHLORIDE 10 MG/1
1 TABLET, EXTENDED RELEASE ORAL DAILY
COMMUNITY
Start: 2024-08-21

## 2024-09-19 DIAGNOSIS — I73.9 PAD (PERIPHERAL ARTERY DISEASE): ICD-10-CM

## 2024-09-19 LAB — VIT B12 SERPL-MCNC: 763 PG/ML (ref 232–1245)

## 2024-09-19 RX ORDER — CLOPIDOGREL BISULFATE 75 MG/1
TABLET ORAL
Qty: 90 TABLET | Refills: 1 | Status: SHIPPED | OUTPATIENT
Start: 2024-09-19

## 2024-09-20 ENCOUNTER — TELEPHONE (OUTPATIENT)
Dept: FAMILY MEDICINE CLINIC | Facility: CLINIC | Age: 74
End: 2024-09-20
Payer: MEDICARE

## 2024-09-23 ENCOUNTER — TELEPHONE (OUTPATIENT)
Dept: FAMILY MEDICINE CLINIC | Facility: CLINIC | Age: 74
End: 2024-09-23
Payer: MEDICARE

## 2024-09-23 LAB — PYRIDOXAL PHOS SERPL-MCNC: 37.8 UG/L (ref 3.4–65.2)

## 2024-09-28 DIAGNOSIS — E03.9 HYPOTHYROIDISM, UNSPECIFIED TYPE: ICD-10-CM

## 2024-09-30 ENCOUNTER — TELEPHONE (OUTPATIENT)
Dept: FAMILY MEDICINE CLINIC | Facility: CLINIC | Age: 74
End: 2024-09-30
Payer: MEDICARE

## 2024-09-30 DIAGNOSIS — K59.00 CONSTIPATION, UNSPECIFIED CONSTIPATION TYPE: ICD-10-CM

## 2024-09-30 RX ORDER — LUBIPROSTONE 24 UG/1
24 CAPSULE ORAL 2 TIMES DAILY WITH MEALS
COMMUNITY

## 2024-09-30 RX ORDER — LIOTHYRONINE SODIUM 5 UG/1
TABLET ORAL
Qty: 60 TABLET | Refills: 0 | Status: SHIPPED | OUTPATIENT
Start: 2024-09-30

## 2024-09-30 NOTE — TELEPHONE ENCOUNTER
JOSE CALLED ABOUT PATIENTS AMITIZA, THE INSTRUCTIONS SAY 1X DAILY BUT PATIENT HAS BEEN TAKING IT TWICE DAILY AND THEY WANT TO KNOW WHICH ONE IS CORRECT

## 2024-10-05 DIAGNOSIS — F51.01 PRIMARY INSOMNIA: ICD-10-CM

## 2024-10-07 RX ORDER — ZOLPIDEM TARTRATE 12.5 MG/1
12.5 TABLET, FILM COATED, EXTENDED RELEASE ORAL NIGHTLY PRN
Qty: 30 TABLET | Refills: 0 | Status: SHIPPED | OUTPATIENT
Start: 2024-10-07

## 2024-10-07 NOTE — TELEPHONE ENCOUNTER
Rx Refill Note    Requested Prescriptions     Pending Prescriptions Disp Refills    zolpidem CR (AMBIEN CR) 12.5 MG CR tablet [Pharmacy Med Name: ZOLPIDEM ER 12.5MG TABLETS] 30 tablet 0     Sig: TAKE 1 TABLET BY MOUTH AT NIGHT AS NEEDED FOR SLEEP        Last office visit with prescribing clinician: 8/15/2024      Next office visit with prescribing clinician: 11/15/2024   Last labs:   Last refill: 06/28/2024   Pharmacy (be sure to add in Epic). correct

## 2024-10-10 RX ORDER — LEVOTHYROXINE SODIUM 50 UG/1
50 TABLET ORAL DAILY
Qty: 30 TABLET | Refills: 0 | Status: SHIPPED | OUTPATIENT
Start: 2024-10-10

## 2024-10-14 RX ORDER — EVOLOCUMAB 140 MG/ML
INJECTION, SOLUTION SUBCUTANEOUS
Qty: 2 ML | Refills: 5 | Status: SHIPPED | OUTPATIENT
Start: 2024-10-14

## 2024-10-19 ENCOUNTER — TELEPHONE (OUTPATIENT)
Dept: FAMILY MEDICINE CLINIC | Facility: CLINIC | Age: 74
End: 2024-10-19
Payer: MEDICARE

## 2024-10-19 DIAGNOSIS — M25.552 LEFT HIP PAIN: Primary | ICD-10-CM

## 2024-10-19 NOTE — TELEPHONE ENCOUNTER
Patient called asking if he can have an order for a hip x-ray, states it has been hurting for a while now. Would like a call back .

## 2024-10-24 DIAGNOSIS — E03.9 HYPOTHYROIDISM, UNSPECIFIED TYPE: ICD-10-CM

## 2024-10-25 RX ORDER — LIOTHYRONINE SODIUM 5 UG/1
TABLET ORAL
Qty: 60 TABLET | Refills: 0 | Status: SHIPPED | OUTPATIENT
Start: 2024-10-25

## 2024-11-04 RX ORDER — LEVOTHYROXINE SODIUM 50 UG/1
50 TABLET ORAL DAILY
Qty: 30 TABLET | Refills: 0 | Status: SHIPPED | OUTPATIENT
Start: 2024-11-04

## 2024-11-08 ENCOUNTER — LAB (OUTPATIENT)
Dept: FAMILY MEDICINE CLINIC | Facility: CLINIC | Age: 74
End: 2024-11-08
Payer: MEDICARE

## 2024-11-08 DIAGNOSIS — F51.01 PRIMARY INSOMNIA: ICD-10-CM

## 2024-11-08 DIAGNOSIS — E78.2 HYPERLIPIDEMIA, MIXED: ICD-10-CM

## 2024-11-08 DIAGNOSIS — I10 ESSENTIAL HYPERTENSION: ICD-10-CM

## 2024-11-08 DIAGNOSIS — N18.31 CHRONIC KIDNEY DISEASE, STAGE 3A: ICD-10-CM

## 2024-11-08 DIAGNOSIS — E03.9 ACQUIRED HYPOTHYROIDISM: ICD-10-CM

## 2024-11-08 DIAGNOSIS — I25.10 CORONARY ARTERY DISEASE INVOLVING NATIVE CORONARY ARTERY OF NATIVE HEART WITHOUT ANGINA PECTORIS: ICD-10-CM

## 2024-11-08 DIAGNOSIS — R73.9 HYPERGLYCEMIA: ICD-10-CM

## 2024-11-09 LAB
25(OH)D3+25(OH)D2 SERPL-MCNC: 70.1 NG/ML (ref 30–100)
ALBUMIN SERPL-MCNC: 4.2 G/DL (ref 3.8–4.8)
ALP SERPL-CCNC: 100 IU/L (ref 44–121)
ALT SERPL-CCNC: 14 IU/L (ref 0–44)
AST SERPL-CCNC: 18 IU/L (ref 0–40)
BASOPHILS # BLD AUTO: 0.1 X10E3/UL (ref 0–0.2)
BASOPHILS NFR BLD AUTO: 2 %
BILIRUB SERPL-MCNC: 0.3 MG/DL (ref 0–1.2)
BUN SERPL-MCNC: 26 MG/DL (ref 8–27)
BUN/CREAT SERPL: 17 (ref 10–24)
CALCIUM SERPL-MCNC: 9.7 MG/DL (ref 8.6–10.2)
CHLORIDE SERPL-SCNC: 101 MMOL/L (ref 96–106)
CHOLEST SERPL-MCNC: 163 MG/DL (ref 100–199)
CO2 SERPL-SCNC: 23 MMOL/L (ref 20–29)
CREAT SERPL-MCNC: 1.51 MG/DL (ref 0.76–1.27)
EGFRCR SERPLBLD CKD-EPI 2021: 48 ML/MIN/1.73
EOSINOPHIL # BLD AUTO: 0.4 X10E3/UL (ref 0–0.4)
EOSINOPHIL NFR BLD AUTO: 8 %
ERYTHROCYTE [DISTWIDTH] IN BLOOD BY AUTOMATED COUNT: 12.7 % (ref 11.6–15.4)
GLOBULIN SER CALC-MCNC: 2.5 G/DL (ref 1.5–4.5)
GLUCOSE SERPL-MCNC: 105 MG/DL (ref 70–99)
HBA1C MFR BLD: 5.8 % (ref 4.8–5.6)
HCT VFR BLD AUTO: 34.9 % (ref 37.5–51)
HDLC SERPL-MCNC: 39 MG/DL
HGB BLD-MCNC: 11.6 G/DL (ref 13–17.7)
IMM GRANULOCYTES # BLD AUTO: 0 X10E3/UL (ref 0–0.1)
IMM GRANULOCYTES NFR BLD AUTO: 0 %
LDLC SERPL CALC-MCNC: 102 MG/DL (ref 0–99)
LYMPHOCYTES # BLD AUTO: 1.3 X10E3/UL (ref 0.7–3.1)
LYMPHOCYTES NFR BLD AUTO: 24 %
MCH RBC QN AUTO: 31 PG (ref 26.6–33)
MCHC RBC AUTO-ENTMCNC: 33.2 G/DL (ref 31.5–35.7)
MCV RBC AUTO: 93 FL (ref 79–97)
MONOCYTES # BLD AUTO: 0.6 X10E3/UL (ref 0.1–0.9)
MONOCYTES NFR BLD AUTO: 11 %
NEUTROPHILS # BLD AUTO: 3 X10E3/UL (ref 1.4–7)
NEUTROPHILS NFR BLD AUTO: 55 %
PLATELET # BLD AUTO: 180 X10E3/UL (ref 150–450)
POTASSIUM SERPL-SCNC: 4.7 MMOL/L (ref 3.5–5.2)
PROT SERPL-MCNC: 6.7 G/DL (ref 6–8.5)
RBC # BLD AUTO: 3.74 X10E6/UL (ref 4.14–5.8)
SODIUM SERPL-SCNC: 137 MMOL/L (ref 134–144)
TRIGL SERPL-MCNC: 123 MG/DL (ref 0–149)
TSH SERPL DL<=0.005 MIU/L-ACNC: 3.64 UIU/ML (ref 0.45–4.5)
VIT B12 SERPL-MCNC: 739 PG/ML (ref 232–1245)
VLDLC SERPL CALC-MCNC: 22 MG/DL (ref 5–40)
WBC # BLD AUTO: 5.3 X10E3/UL (ref 3.4–10.8)

## 2024-11-20 ENCOUNTER — OFFICE VISIT (OUTPATIENT)
Dept: FAMILY MEDICINE CLINIC | Facility: CLINIC | Age: 74
End: 2024-11-20
Payer: MEDICARE

## 2024-11-20 VITALS
HEART RATE: 58 BPM | RESPIRATION RATE: 15 BRPM | DIASTOLIC BLOOD PRESSURE: 80 MMHG | BODY MASS INDEX: 29.12 KG/M2 | WEIGHT: 215 LBS | SYSTOLIC BLOOD PRESSURE: 122 MMHG | OXYGEN SATURATION: 96 % | HEIGHT: 72 IN

## 2024-11-20 DIAGNOSIS — D64.9 ANEMIA, UNSPECIFIED TYPE: ICD-10-CM

## 2024-11-20 DIAGNOSIS — Z00.00 MEDICARE ANNUAL WELLNESS VISIT, SUBSEQUENT: Primary | ICD-10-CM

## 2024-11-20 DIAGNOSIS — E03.9 ACQUIRED HYPOTHYROIDISM: ICD-10-CM

## 2024-11-20 DIAGNOSIS — L03.313 CELLULITIS OF CHEST WALL: ICD-10-CM

## 2024-11-20 DIAGNOSIS — I25.10 CORONARY ARTERY DISEASE INVOLVING NATIVE HEART WITHOUT ANGINA PECTORIS, UNSPECIFIED VESSEL OR LESION TYPE: ICD-10-CM

## 2024-11-20 DIAGNOSIS — E78.2 HYPERLIPIDEMIA, MIXED: ICD-10-CM

## 2024-11-20 DIAGNOSIS — K21.9 GASTROESOPHAGEAL REFLUX DISEASE WITHOUT ESOPHAGITIS: ICD-10-CM

## 2024-11-20 DIAGNOSIS — M54.50 CHRONIC MIDLINE LOW BACK PAIN WITHOUT SCIATICA: ICD-10-CM

## 2024-11-20 DIAGNOSIS — Z00.00 PHYSICAL EXAM: ICD-10-CM

## 2024-11-20 DIAGNOSIS — M25.552 LEFT HIP PAIN: ICD-10-CM

## 2024-11-20 DIAGNOSIS — I73.9 PAD (PERIPHERAL ARTERY DISEASE): ICD-10-CM

## 2024-11-20 DIAGNOSIS — N18.31 CHRONIC KIDNEY DISEASE, STAGE 3A: ICD-10-CM

## 2024-11-20 DIAGNOSIS — G89.29 CHRONIC MIDLINE LOW BACK PAIN WITHOUT SCIATICA: ICD-10-CM

## 2024-11-20 DIAGNOSIS — F51.01 PRIMARY INSOMNIA: ICD-10-CM

## 2024-11-20 DIAGNOSIS — R73.9 HYPERGLYCEMIA: ICD-10-CM

## 2024-11-20 PROCEDURE — 3074F SYST BP LT 130 MM HG: CPT | Performed by: FAMILY MEDICINE

## 2024-11-20 PROCEDURE — 1170F FXNL STATUS ASSESSED: CPT | Performed by: FAMILY MEDICINE

## 2024-11-20 PROCEDURE — 90662 IIV NO PRSV INCREASED AG IM: CPT | Performed by: FAMILY MEDICINE

## 2024-11-20 PROCEDURE — 99214 OFFICE O/P EST MOD 30 MIN: CPT | Performed by: FAMILY MEDICINE

## 2024-11-20 PROCEDURE — G0439 PPPS, SUBSEQ VISIT: HCPCS | Performed by: FAMILY MEDICINE

## 2024-11-20 PROCEDURE — 96160 PT-FOCUSED HLTH RISK ASSMT: CPT | Performed by: FAMILY MEDICINE

## 2024-11-20 PROCEDURE — G0008 ADMIN INFLUENZA VIRUS VAC: HCPCS | Performed by: FAMILY MEDICINE

## 2024-11-20 PROCEDURE — 1126F AMNT PAIN NOTED NONE PRSNT: CPT | Performed by: FAMILY MEDICINE

## 2024-11-20 PROCEDURE — 99397 PER PM REEVAL EST PAT 65+ YR: CPT | Performed by: FAMILY MEDICINE

## 2024-11-20 PROCEDURE — 3079F DIAST BP 80-89 MM HG: CPT | Performed by: FAMILY MEDICINE

## 2024-11-20 RX ORDER — EZETIMIBE 10 MG/1
10 TABLET ORAL DAILY
Qty: 90 TABLET | Refills: 1 | Status: SHIPPED | OUTPATIENT
Start: 2024-11-20

## 2024-11-20 RX ORDER — LOSARTAN POTASSIUM 100 MG/1
100 TABLET ORAL DAILY
Qty: 90 TABLET | Refills: 1 | Status: SHIPPED | OUTPATIENT
Start: 2024-11-20

## 2024-11-20 RX ORDER — LEVOTHYROXINE SODIUM 50 UG/1
50 TABLET ORAL DAILY
Qty: 30 TABLET | Refills: 0 | Status: SHIPPED | OUTPATIENT
Start: 2024-11-20

## 2024-11-20 RX ORDER — ZOLPIDEM TARTRATE 12.5 MG/1
12.5 TABLET, FILM COATED, EXTENDED RELEASE ORAL NIGHTLY PRN
Qty: 30 TABLET | Refills: 0 | Status: SHIPPED | OUTPATIENT
Start: 2024-11-20

## 2024-11-20 RX ORDER — OMEPRAZOLE 40 MG/1
40 CAPSULE, DELAYED RELEASE ORAL
Qty: 180 CAPSULE | Refills: 0 | Status: SHIPPED | OUTPATIENT
Start: 2024-11-20

## 2024-11-20 RX ORDER — TRAMADOL HYDROCHLORIDE 50 MG/1
50 TABLET ORAL EVERY 12 HOURS PRN
Qty: 60 TABLET | Refills: 2 | Status: SHIPPED | OUTPATIENT
Start: 2024-11-20

## 2024-11-20 RX ORDER — METHOCARBAMOL 750 MG/1
750 TABLET, FILM COATED ORAL 2 TIMES DAILY
Qty: 180 TABLET | Refills: 1 | Status: SHIPPED | OUTPATIENT
Start: 2024-11-20

## 2024-11-20 NOTE — ASSESSMENT & PLAN NOTE
Patient is anemic.  No signs or symptoms.  We will recheck in 3 months.  Patient does have a history of prostate cancer.  Has had a colonoscopy this year.  I am going to check anemia studies today.  We will recheck in 3 months

## 2024-11-20 NOTE — PROGRESS NOTES
The ABCs of the Annual Wellness Visit  Subsequent Medicare Wellness Visit    Chief Complaint   Patient presents with    Med Refill     Rx refill    Medicare Wellness-subsequent     wellness      Subjective    History of Present Illness:  Kavon Waters Jr. is a 74 y.o. male who presents for a Subsequent Medicare Wellness Visit.    The following portions of the patient's history were reviewed and   updated as appropriate: allergies, current medications, past family history, past medical history, past social history, past surgical history, and problem list.    Compared to one year ago, the patient feels his physical   health is the same.    Compared to one year ago, the patient feels his mental   health is the same.    Recent Hospitalizations:  He was not admitted to the hospital during the last year.       Current Medical Providers:  Patient Care Team:  Mina Story MD as PCP - General  Jg Garcia Jr., MD as Consulting Physician (Urology)  Rosendo Jo MD (Dermatology)  Alida Mcknight MD as Consulting Physician (Hematology and Oncology)    Outpatient Medications Prior to Visit   Medication Sig Dispense Refill    alfuzosin (UROXATRAL) 10 MG 24 hr tablet Take 1 tablet by mouth Daily.      aspirin 81 MG chewable tablet Chew 1 tablet Daily.      bicalutamide (CASODEX) 50 MG chemo tablet Take 1 tablet by mouth Daily.      cetirizine (zyrTEC) 10 MG tablet TAKE 1 TABLET BY MOUTH EVERY DAY 90 tablet 0    clopidogrel (PLAVIX) 75 MG tablet TAKE 1 TABLET BY MOUTH EVERY DAY 90 tablet 1    fluticasone (CUTIVATE) 0.05 % cream Apply 1 Application topically to the appropriate area as directed Daily.      fluticasone (FLONASE) 50 MCG/ACT nasal spray USE 2 SPRAYS IN EACH NOSTRIL AS DIRECTED DAILY 48 g 1    ketoconazole (NIZORAL) 2 % shampoo Apply  topically to the appropriate area as directed 1 (One) Time Per Week. 120 mL 3    liothyronine (CYTOMEL) 5 MCG tablet TAKE 1 TABLET BY MOUTH TWICE DAILY 60  tablet 0    lubiprostone (AMITIZA) 24 MCG capsule Take 1 capsule by mouth 2 (Two) Times a Day With Meals.      Repatha SureClick solution auto-injector SureClick injection ADMINISTER 1 ML UNDER THE SKIN EVERY 14 DAYS AS DIRECTED 2 mL 5    triamcinolone (KENALOG) 0.1 % ointment Apply 1 Application topically to the appropriate area as directed 2 (Two) Times a Day.      levothyroxine (SYNTHROID, LEVOTHROID) 50 MCG tablet TAKE 1 TABLET BY MOUTH DAILY 30 tablet 0    losartan (COZAAR) 100 MG tablet TAKE 1 TABLET BY MOUTH DAILY 90 tablet 1    methocarbamol (ROBAXIN) 750 MG tablet Take 1 tablet by mouth 2 (Two) Times a Day. 180 tablet 1    omeprazole (priLOSEC) 40 MG capsule TAKE 1 CAPSULE BY MOUTH TWICE DAILY BEFORE MEALS 180 capsule 0    traMADol (ULTRAM) 50 MG tablet Take 1 tablet by mouth Every 12 (Twelve) Hours As Needed for Moderate Pain. 60 tablet 2    zolpidem CR (AMBIEN CR) 12.5 MG CR tablet TAKE 1 TABLET BY MOUTH AT NIGHT AS NEEDED FOR SLEEP 30 tablet 0     No facility-administered medications prior to visit.       Opioid medication/s are on active medication list.  and I have evaluated his active treatment plan and pain score trends (see table).  Vitals:    11/20/24 1023   PainSc: 0-No pain     I have reviewed the chart for potential of high risk medication and harmful drug interactions in the elderly.          Aspirin is on active medication list. Aspirin use is indicated based on review of current medical condition/s. Pros and cons of this therapy have been discussed today. Benefits of this medication outweigh potential harm.  Patient has been encouraged to continue taking this medication.  .      Patient Active Problem List   Diagnosis    Unstable angina    Angina at rest    Coronary artery disease involving native coronary artery of native heart with angina pectoris    PAD (peripheral artery disease)    Coronary artery disease involving native heart without angina pectoris    S/P CABG x 2. 4/5/18    Essential  hypertension    Hyperlipidemia, mixed    GERD (gastroesophageal reflux disease)    History of prostate Cancer s/p radiation therapy    History of left corona radiata CVA 2015. No residual and no occlusive carotid artery disease    Neck pain    Back pain    Balanitis    Chronic kidney disease    Fatigue    Hot flash due to medication    Injection site reaction    Hormone sensitive prostate cancer    Malignant tumor of prostate    Overweight with body mass index (BMI) 25.0-29.9    Raised prostate specific antigen    Acquired hypothyroidism    Chronic kidney disease, stage 3a    Primary insomnia    Hyperglycemia    Cellulitis of chest wall    BPH associated with nocturia    Chronic idiopathic constipation    High risk medication use    Ischemic myocardial dysfunction    Long term (current) use of anticoagulants    Obstructive sleep apnea syndrome    Paroxysmal atrial fibrillation    Primary osteoarthritis involving multiple joints    RBBB    Recurrent major depression in full remission    Seasonal allergies    Metastasis from malignant tumor of prostate    Anemia    Prostatitis    Candidiasis of anus    High prostate specific antigen (PSA)    Screening for malignant neoplasm of colon    Physical exam    Atherosclerosis of native coronary artery with angina pectoris    Disease of cardiovascular system    Left hip pain     Advance Care Planning  Advance Directive is not on file.  ACP discussion was held with the patient during this visit. Patient does not have an advance directive, declines further assistance.    Review of Systems   Constitutional: Negative.    HENT: Negative.     Eyes: Negative.    Respiratory: Negative.     Cardiovascular: Negative.    Gastrointestinal: Negative.    Neurological: Negative.         Objective    Vitals:    11/20/24 1023   BP: 122/80   BP Location: Right arm   Patient Position: Sitting   Cuff Size: Adult   Pulse: 58   Resp: 15   SpO2: 96%   Weight: 97.5 kg (215 lb)   Height: 182.9 cm  "(72\")   PainSc: 0-No pain     Estimated body mass index is 29.16 kg/m² as calculated from the following:    Height as of this encounter: 182.9 cm (72\").    Weight as of this encounter: 97.5 kg (215 lb).           Does the patient have evidence of cognitive impairment? No    Physical Exam  Vitals and nursing note reviewed.   Constitutional:       Appearance: Normal appearance. He is normal weight.   HENT:      Head: Normocephalic and atraumatic.      Right Ear: Tympanic membrane, ear canal and external ear normal.      Left Ear: Tympanic membrane, ear canal and external ear normal.      Nose: Nose normal.      Mouth/Throat:      Mouth: Mucous membranes are moist.      Pharynx: Oropharynx is clear.   Eyes:      Extraocular Movements: Extraocular movements intact.      Conjunctiva/sclera: Conjunctivae normal.      Pupils: Pupils are equal, round, and reactive to light.   Cardiovascular:      Rate and Rhythm: Normal rate and regular rhythm.      Pulses: Normal pulses.      Heart sounds: Normal heart sounds.   Pulmonary:      Effort: Pulmonary effort is normal.      Breath sounds: Normal breath sounds.   Abdominal:      General: Abdomen is flat. Bowel sounds are normal.      Palpations: Abdomen is soft.   Musculoskeletal:         General: Normal range of motion.      Cervical back: Normal range of motion and neck supple.   Feet:      Comments:      Skin:     General: Skin is warm and dry.   Neurological:      General: No focal deficit present.      Mental Status: He is alert and oriented to person, place, and time.   Psychiatric:         Mood and Affect: Mood normal.         Behavior: Behavior normal.         Thought Content: Thought content normal.         Judgment: Judgment normal.       Lab Results   Component Value Date    CHLPL 163 11/08/2024    TRIG 123 11/08/2024    HDL 39 (L) 11/08/2024     (H) 11/08/2024    VLDL 22 11/08/2024    HGBA1C 5.8 (H) 11/08/2024            HEALTH RISK ASSESSMENT    Smoking " Status:  Social History     Tobacco Use   Smoking Status Former    Current packs/day: 0.00    Average packs/day: 1 pack/day for 23.3 years (23.3 ttl pk-yrs)    Types: Cigarettes    Start date: 1965    Quit date: 1988    Years since quittin.6    Passive exposure: Past   Smokeless Tobacco Never   Tobacco Comments    QUIT 30 YEARS AGO     Alcohol Consumption:  Social History     Substance and Sexual Activity   Alcohol Use No     Fall Risk Screen:    STEADI Fall Risk Assessment was completed, and patient is at LOW risk for falls.Assessment completed on:3/27/2024    Depression Screenin/15/2024    10:30 AM   PHQ-2/PHQ-9 Depression Screening   Retired Little Interest or Pleasure in Doing Things 0-->not at all   Retired Feeling Down, Depressed or Hopeless 0-->not at all   Retired PHQ-9: Brief Depression Severity Measure Score 0       Health Habits and Functional and Cognitive Screenin/20/2024    10:00 AM   Functional & Cognitive Status   Do you have difficulty preparing food and eating? No   Do you have difficulty bathing yourself, getting dressed or grooming yourself? No   Do you have difficulty using the toilet? No   Do you have difficulty moving around from place to place? No   Do you have trouble with steps or getting out of a bed or a chair? No   Current Diet Well Balanced Diet   Dental Exam Up to date   Eye Exam Up to date   Exercise (times per week) 7 times per week   Current Exercises Include Weightlifting;Walking   Do you need help using the phone?  No   Are you deaf or do you have serious difficulty hearing?  No   Do you need help to go to places out of walking distance? No   Do you need help shopping? No   Do you need help preparing meals?  No   Do you need help with housework?  No   Do you need help with laundry? No   Do you need help taking your medications? No   Do you need help managing money? No   Do you ever drive or ride in a car without wearing a seat belt? No   Have you  felt unusual stress, anger or loneliness in the last month? No   Who do you live with? Spouse   If you need help, do you have trouble finding someone available to you? No   Have you been bothered in the last four weeks by sexual problems? No   Do you have difficulty concentrating, remembering or making decisions? No       Age-appropriate Screening Schedule:  Refer to the list below for future screening recommendations based on patient's age, sex and/or medical conditions. Orders for these recommended tests are listed in the plan section. The patient has been provided with a written plan.    Health Maintenance   Topic Date Due    ZOSTER VACCINE (1 of 2) Never done    TDAP/TD VACCINES (2 - Td or Tdap) 08/12/2021    COVID-19 Vaccine (4 - 2024-25 season) 09/01/2024    RSV Vaccine - Adults (1 - 1-dose 75+ series) Never done    BMI FOLLOWUP  03/27/2025    LIPID PANEL  11/08/2025    ANNUAL WELLNESS VISIT  11/20/2025    COLORECTAL CANCER SCREENING  05/08/2034    HEPATITIS C SCREENING  Completed    INFLUENZA VACCINE  Completed    Pneumococcal Vaccine 65+  Completed    AAA SCREEN (ONE-TIME)  Completed              Assessment & Plan   CMS Preventative Services Quick Reference  Risk Factors Identified During Encounter  None Identified  The above risks/problems have been discussed with the patient.  Follow up actions/plans if indicated are seen below in the Assessment/Plan Section.  Pertinent information has been shared with the patient in the After Visit Summary.    Diagnoses and all orders for this visit:    1. Medicare annual wellness visit, subsequent (Primary)  Assessment & Plan:  We discussed health maintenance, diet, exercise, and immunizations.    Orders:  -     Hemoglobin A1c; Future  -     Lipid Panel; Future  -     Comprehensive Metabolic Panel; Future  -     Vitamin B12; Future  -     Vitamin D,25-Hydroxy; Future  -     TSH Rfx On Abnormal To Free T4; Future  -     CBC & Differential; Future    2. Physical  exam  Assessment & Plan:  We discussed health maintenance, diet, exercise, and immunizations.    Orders:  -     Hemoglobin A1c; Future  -     Lipid Panel; Future  -     Comprehensive Metabolic Panel; Future  -     Vitamin B12; Future  -     Vitamin D,25-Hydroxy; Future  -     TSH Rfx On Abnormal To Free T4; Future  -     CBC & Differential; Future    3. Gastroesophageal reflux disease without esophagitis  -     omeprazole (priLOSEC) 40 MG capsule; Take 1 capsule by mouth 2 (Two) Times a Day Before Meals.  Dispense: 180 capsule; Refill: 0  -     Hemoglobin A1c; Future  -     Lipid Panel; Future  -     Comprehensive Metabolic Panel; Future  -     Vitamin B12; Future  -     Vitamin D,25-Hydroxy; Future  -     TSH Rfx On Abnormal To Free T4; Future  -     CBC & Differential; Future    4. Hyperlipidemia, mixed  Assessment & Plan:  Patient is on Repatha.  I am going to start him on Zetia.  Recheck in 3 months.    Orders:  -     traMADol (ULTRAM) 50 MG tablet; Take 1 tablet by mouth Every 12 (Twelve) Hours As Needed for Moderate Pain.  Dispense: 60 tablet; Refill: 2  -     Hemoglobin A1c; Future  -     Lipid Panel; Future  -     Comprehensive Metabolic Panel; Future  -     Vitamin B12; Future  -     Vitamin D,25-Hydroxy; Future  -     TSH Rfx On Abnormal To Free T4; Future  -     CBC & Differential; Future    5. Acquired hypothyroidism  -     traMADol (ULTRAM) 50 MG tablet; Take 1 tablet by mouth Every 12 (Twelve) Hours As Needed for Moderate Pain.  Dispense: 60 tablet; Refill: 2  -     Hemoglobin A1c; Future  -     Lipid Panel; Future  -     Comprehensive Metabolic Panel; Future  -     Vitamin B12; Future  -     Vitamin D,25-Hydroxy; Future  -     TSH Rfx On Abnormal To Free T4; Future  -     CBC & Differential; Future    6. Hyperglycemia  -     traMADol (ULTRAM) 50 MG tablet; Take 1 tablet by mouth Every 12 (Twelve) Hours As Needed for Moderate Pain.  Dispense: 60 tablet; Refill: 2  -     Hemoglobin A1c; Future  -      Lipid Panel; Future  -     Comprehensive Metabolic Panel; Future  -     Vitamin B12; Future  -     Vitamin D,25-Hydroxy; Future  -     TSH Rfx On Abnormal To Free T4; Future  -     CBC & Differential; Future    7. Chronic kidney disease, stage 3a  Assessment & Plan:  GFR is 48    Orders:  -     traMADol (ULTRAM) 50 MG tablet; Take 1 tablet by mouth Every 12 (Twelve) Hours As Needed for Moderate Pain.  Dispense: 60 tablet; Refill: 2  -     Hemoglobin A1c; Future  -     Lipid Panel; Future  -     Comprehensive Metabolic Panel; Future  -     Vitamin B12; Future  -     Vitamin D,25-Hydroxy; Future  -     TSH Rfx On Abnormal To Free T4; Future  -     CBC & Differential; Future    8. Primary insomnia  -     traMADol (ULTRAM) 50 MG tablet; Take 1 tablet by mouth Every 12 (Twelve) Hours As Needed for Moderate Pain.  Dispense: 60 tablet; Refill: 2  -     zolpidem CR (AMBIEN CR) 12.5 MG CR tablet; Take 1 tablet by mouth At Night As Needed for Sleep.  Dispense: 30 tablet; Refill: 0  -     Hemoglobin A1c; Future  -     Lipid Panel; Future  -     Comprehensive Metabolic Panel; Future  -     Vitamin B12; Future  -     Vitamin D,25-Hydroxy; Future  -     TSH Rfx On Abnormal To Free T4; Future  -     CBC & Differential; Future    9. Chronic midline low back pain without sciatica  -     traMADol (ULTRAM) 50 MG tablet; Take 1 tablet by mouth Every 12 (Twelve) Hours As Needed for Moderate Pain.  Dispense: 60 tablet; Refill: 2  -     Hemoglobin A1c; Future  -     Lipid Panel; Future  -     Comprehensive Metabolic Panel; Future  -     Vitamin B12; Future  -     Vitamin D,25-Hydroxy; Future  -     TSH Rfx On Abnormal To Free T4; Future  -     CBC & Differential; Future    10. Cellulitis of chest wall  -     traMADol (ULTRAM) 50 MG tablet; Take 1 tablet by mouth Every 12 (Twelve) Hours As Needed for Moderate Pain.  Dispense: 60 tablet; Refill: 2  -     Hemoglobin A1c; Future  -     Lipid Panel; Future  -     Comprehensive Metabolic Panel;  Future  -     Vitamin B12; Future  -     Vitamin D,25-Hydroxy; Future  -     TSH Rfx On Abnormal To Free T4; Future  -     CBC & Differential; Future    11. Coronary artery disease involving native heart without angina pectoris, unspecified vessel or lesion type  -     traMADol (ULTRAM) 50 MG tablet; Take 1 tablet by mouth Every 12 (Twelve) Hours As Needed for Moderate Pain.  Dispense: 60 tablet; Refill: 2  -     Hemoglobin A1c; Future  -     Lipid Panel; Future  -     Comprehensive Metabolic Panel; Future  -     Vitamin B12; Future  -     Vitamin D,25-Hydroxy; Future  -     TSH Rfx On Abnormal To Free T4; Future  -     CBC & Differential; Future    12. PAD (peripheral artery disease)  -     Hemoglobin A1c; Future  -     Lipid Panel; Future  -     Comprehensive Metabolic Panel; Future  -     Vitamin B12; Future  -     Vitamin D,25-Hydroxy; Future  -     TSH Rfx On Abnormal To Free T4; Future  -     CBC & Differential; Future    13. Left hip pain  -     Ambulatory Referral to Orthopedic Surgery  -     Hemoglobin A1c; Future  -     Lipid Panel; Future  -     Comprehensive Metabolic Panel; Future  -     Vitamin B12; Future  -     Vitamin D,25-Hydroxy; Future  -     TSH Rfx On Abnormal To Free T4; Future  -     CBC & Differential; Future    14. Anemia, unspecified type  Assessment & Plan:  Patient is anemic.  No signs or symptoms.  We will recheck in 3 months.  Patient does have a history of prostate cancer.  Has had a colonoscopy this year.  I am going to check anemia studies today.  We will recheck in 3 months    Orders:  -     Ferritin; Future  -     Folate; Future  -     Iron Profile; Future  -     Vitamin B12; Future  -     Hemoglobin A1c; Future  -     Lipid Panel; Future  -     Comprehensive Metabolic Panel; Future  -     Vitamin B12; Future  -     Vitamin D,25-Hydroxy; Future  -     TSH Rfx On Abnormal To Free T4; Future  -     CBC & Differential; Future    Other orders  -     Fluzone High-Dose 65+yrs  -      levothyroxine (SYNTHROID, LEVOTHROID) 50 MCG tablet; Take 1 tablet by mouth Daily.  Dispense: 30 tablet; Refill: 0  -     losartan (COZAAR) 100 MG tablet; Take 1 tablet by mouth Daily.  Dispense: 90 tablet; Refill: 1  -     methocarbamol (ROBAXIN) 750 MG tablet; Take 1 tablet by mouth 2 (Two) Times a Day.  Dispense: 180 tablet; Refill: 1  -     ezetimibe (ZETIA) 10 MG tablet; Take 1 tablet by mouth Daily.  Dispense: 90 tablet; Refill: 1        Follow Up:   No follow-ups on file.     An After Visit Summary and PPPS were made available to the patient.          I spent 10 minutes caring for Kavon on this date of service. This time includes time spent by me in the following activities:preparing for the visit, reviewing tests, obtaining and/or reviewing a separately obtained history, performing a medically appropriate examination and/or evaluation , counseling and educating the patient/family/caregiver, ordering medications, tests, or procedures, referring and communicating with other health care professionals , documenting information in the medical record, independently interpreting results and communicating that information with the patient/family/caregiver, and care coordination

## 2024-11-21 LAB
FERRITIN SERPL-MCNC: 125 NG/ML (ref 30–400)
FOLATE SERPL-MCNC: 16.7 NG/ML
IRON SATN MFR SERPL: 33 % (ref 15–55)
IRON SERPL-MCNC: 82 UG/DL (ref 38–169)
TIBC SERPL-MCNC: 248 UG/DL (ref 250–450)
UIBC SERPL-MCNC: 166 UG/DL (ref 111–343)
VIT B12 SERPL-MCNC: 664 PG/ML (ref 232–1245)

## 2024-11-22 DIAGNOSIS — E03.9 HYPOTHYROIDISM, UNSPECIFIED TYPE: ICD-10-CM

## 2024-11-22 RX ORDER — LIOTHYRONINE SODIUM 5 UG/1
TABLET ORAL
Qty: 60 TABLET | Refills: 0 | Status: SHIPPED | OUTPATIENT
Start: 2024-11-22

## 2024-11-25 NOTE — ASSESSMENT & PLAN NOTE
Refill tramadol.   Speech Therapy      Visit Type: Evaluation  -  Communication/cognition  Reason for consult: Cognitive impairment    Relevant History/Co-morbidities: -  11/22/24: D/c SLP  11/16/24: Remain on regular/thin per MD   11/15/24: CSE: NPO, VFSS  11/14/24: Found down by neighbor, presents with weakness, dehydration, rhabdomyolysis     hx includes: CAD, HTN    SUBJECTIVE  Patient pleasant and cooperative. Sitter at bedside.       - Patient/family goals: maximize function and return home   Pain at onset of session:   Patient does not demonstrate pain behaviors.      OBJECTIVE         Communication/Cognition  Orientation Level:    - Person: oriented    - Place: oriented    - Month: oriented with cues    - Year: oriented with cues    - Date: oriented with cues    - Day of week: oriented with cues    - Reason for hospitalization: oriented    - Length of stay: not oriented  Memory:       - New learning: impaired    - People memory: impaired    - Short term memory: impaired  Problem Solving:      - Simple reasoning: impaired    - Abstract reasoning: impaired    - Safety: impaired    - Insight: impaired  Interfering Components: impulsivity and cognition               Education:   - Present and ready to learn: patient  Education provided during session:  - role of SLP  - Results of above outlined education: Needs reinforcement    ASSESSMENT  Patient will benefit from skilled therapy to address listed impairments and functional limitations.      Discharge needs based on today's assessment:  - Current level of function: slightly below baseline level of function and significantly below baseline level of function (Question prior cognitive impairments (unsure of baseline))  - Therapy needs at discharge: therapy 1-3 times per week  - ADL / IADLs (activities / instrumental activities of daily living) requiring support at discharge: health/medication management, emergency responses, shopping, nutrition management (eating/drinking),  financial management and household care  - Impairments that require further therapy intervention: cognition    Patient seen for comm/cog evaluation. Despite previous attempts patient unable to recall purposes of assessment, role of speech therapy. Patient with improved participation in discussion with informal approach. Structured tasks not attempted during session.     Patient able to recall reasoning for hospitalization. However, demonstrates decreased insight into safety, concerns regarding returning home independently. Patient agreeable to assistance \"2x/month.\" Frequently states no desire to remain with family (reports daughters requesting move to either [place], [place] to be closer). Writer discussed advantages/disadvantages of halfway vs Senior housing. Patient with perseveration on \"low income housing,\" stating financial preference to \"save money to distribute to family members.\" Writer educated on benefits of assisted living. Patient able to appropriately agree, discuss and reason regarding benefits including medication assistance, meals provided, accessibility, emergency assistance. Writer utilized written aid to review/recall items discussed. Patient with requests for calendars to improve orientation to date/time (oriented with cues, continuing to require repetition of date throughout session). Educated on use of writing/note taking for recall of needs, questions for providers. Patient appreciative of calendar, written items.     Will continue to follow for verbal reasoning, functional problem solving, insight and safety awareness.     PLAN (while hospitalized)  CC: Functional reasoning, problem solving, safety, insight    SLP Frequency: 3-5 x per week    SLP Identified Barriers to Discharge: cognitive deficits  Interventions:  Communication/cognition therapy and patient/family education    Plan/Goal Agreement:  Patient agrees with goals and treatment plan      RECOMMENDATIONS     -Communication Cognition:           *Patient continues to demonstrate acute communication and cognition deficits, will continue to follow.  Patient will require periodic supervision at discharge and assist with instrumental activities of daily living.      GOALS  Review Date: 12/1/2024  Short Term Goals:   Patient will demonstrate mild-moderate deficits in verbal reasoning, problem solving, safety awareness tasks given mod cues.         Long Term Goals: (to be met by time of discharge from hospital)  Patient will demonstrate mild cognitive communication deficits given occasional cues at supervision level.      Documented in the chart in the following areas: Assessment.       Therapy procedure time and total treatment time can be found documented on the Time Entry flowsheet

## 2024-11-26 RX ORDER — KETOCONAZOLE 20 MG/ML
SHAMPOO, SUSPENSION TOPICAL
Qty: 120 ML | Refills: 0 | Status: SHIPPED | OUTPATIENT
Start: 2024-11-26

## 2024-11-27 ENCOUNTER — TELEPHONE (OUTPATIENT)
Dept: FAMILY MEDICINE CLINIC | Facility: CLINIC | Age: 74
End: 2024-11-27
Payer: MEDICARE

## 2024-11-27 DIAGNOSIS — I25.10 CORONARY ARTERY DISEASE INVOLVING NATIVE HEART WITHOUT ANGINA PECTORIS, UNSPECIFIED VESSEL OR LESION TYPE: ICD-10-CM

## 2024-11-27 DIAGNOSIS — R73.9 HYPERGLYCEMIA: ICD-10-CM

## 2024-11-27 DIAGNOSIS — F51.01 PRIMARY INSOMNIA: ICD-10-CM

## 2024-11-27 DIAGNOSIS — M54.50 CHRONIC MIDLINE LOW BACK PAIN WITHOUT SCIATICA: ICD-10-CM

## 2024-11-27 DIAGNOSIS — L03.313 CELLULITIS OF CHEST WALL: ICD-10-CM

## 2024-11-27 DIAGNOSIS — N18.31 CHRONIC KIDNEY DISEASE, STAGE 3A: ICD-10-CM

## 2024-11-27 DIAGNOSIS — E78.2 HYPERLIPIDEMIA, MIXED: ICD-10-CM

## 2024-11-27 DIAGNOSIS — E03.9 ACQUIRED HYPOTHYROIDISM: ICD-10-CM

## 2024-11-27 DIAGNOSIS — G89.29 CHRONIC MIDLINE LOW BACK PAIN WITHOUT SCIATICA: ICD-10-CM

## 2024-11-27 RX ORDER — TRAMADOL HYDROCHLORIDE 50 MG/1
50 TABLET ORAL EVERY 12 HOURS PRN
Qty: 60 TABLET | Refills: 2 | Status: SHIPPED | OUTPATIENT
Start: 2024-11-27

## 2024-11-27 NOTE — TELEPHONE ENCOUNTER
Caller: Kavon Waters Jr.    Relationship: Self    Best call back number: [phone]     What medication are you requesting: PAIN MEDICATION     What are your current symptoms: BURSITIS IN LEFT HIP     How long have you been experiencing symptoms: A COUPLE OF WEEKS     Have you had these symptoms before:    [x] Yes  [] No    Have you been treated for these symptoms before:   [x] Yes  [] No    If a prescription is needed, what is your preferred pharmacy and phone number:    [id number] [place] [address] [address] AT [facility] & [address] [facility] - [phone]  - [phone]  [phone]     Additional notes:  PATIENT STATED THAT THE BURSITIS IN HIS LEFT HIP AND CAUSING HIM A LOT OF PAIN AND CAUSING HIM TO NOT BE ABLE TO SLEEP AT TIME AND WOULD LIKE FOR A PAIN MEDICATION TO BE CALLED INTO THE PHARMACY TO HELP SINCE     traMADol (ULTRAM) 50 MG tablet     IS NOT HELPING WITH THE PAIN AT THIS TIME

## 2024-11-27 NOTE — TELEPHONE ENCOUNTER
PT STATES THAT HE TAKES TRAMADOL NOW FOR ARTHRITIS AND IT DOES NOT HELP HIS HIP PAIN. HE IS REQUESTING ANOTHER MED BE SENT IN. PLEASE ADVISE.

## 2024-12-02 RX ORDER — CETIRIZINE HYDROCHLORIDE 10 MG/1
TABLET ORAL
Qty: 90 TABLET | Refills: 0 | Status: SHIPPED | OUTPATIENT
Start: 2024-12-02

## 2024-12-02 NOTE — TELEPHONE ENCOUNTER
Rx Refill Note    Requested Prescriptions     Pending Prescriptions Disp Refills    cetirizine (zyrTEC) 10 MG tablet [Pharmacy Med Name: CETIRIZINE 10MG TABLETS] 90 tablet 0     Sig: TAKE 1 TABLET BY MOUTH EVERY DAY        Last office visit with prescribing clinician: 11/20/2024      Next office visit with prescribing clinician: 2/20/2025   Last labs:   Last refill: 08/15/2024   Pharmacy (be sure to add in Epic). correct

## 2024-12-26 DIAGNOSIS — E03.9 HYPOTHYROIDISM, UNSPECIFIED TYPE: ICD-10-CM

## 2024-12-26 RX ORDER — LIOTHYRONINE SODIUM 5 UG/1
TABLET ORAL
Qty: 180 TABLET | Refills: 1 | Status: SHIPPED | OUTPATIENT
Start: 2024-12-26

## 2025-01-02 RX ORDER — LEVOTHYROXINE SODIUM 50 UG/1
50 TABLET ORAL DAILY
Qty: 30 TABLET | Refills: 0 | Status: SHIPPED | OUTPATIENT
Start: 2025-01-02

## 2025-01-14 RX ORDER — AMLODIPINE BESYLATE 5 MG/1
5 TABLET ORAL DAILY
Qty: 90 TABLET | Refills: 1 | Status: SHIPPED | OUTPATIENT
Start: 2025-01-14

## 2025-01-27 RX ORDER — LEVOTHYROXINE SODIUM 50 UG/1
50 TABLET ORAL DAILY
Qty: 30 TABLET | Refills: 0 | Status: SHIPPED | OUTPATIENT
Start: 2025-01-27

## 2025-01-28 NOTE — THERAPY TREATMENT NOTE
Acute Care - Physical Therapy Treatment Note  [facility]     Patient Name: Kavon Waters Jr.  : 1950  MRN: [id number]  Today's Date: 2018  Onset of Illness/Injury or Date of Surgery: 18  Date of Referral to PT: 18  Referring Physician: MD Mittal    Admit Date: 2018    Visit Dx:    ICD-10-CM ICD-9-CM   1. Impaired functional mobility, balance, gait, and endurance Z74.09 V49.89   2. Coronary artery disease of native artery of native heart with stable angina pectoris I25.118 414.01     413.9     Patient Active Problem List   Diagnosis   • Unstable angina   • Angina at rest   • Coronary artery disease involving native coronary artery of native heart with angina pectoris   • Peripheral arterial disease   • Three-vessel Coronary artery disease with 99% occluded stents to LAD, circumflex, RCA   • S/P CABG x 2. 18   • Hypertension   • Hyperlipidemia   • GERD (gastroesophageal reflux disease)   • History of prostate Cancer s/p radiation therapy   • History of left corona radiata CVA . No residual and no occlusive carotid artery disease       Therapy Treatment    Therapy Treatment / Health Promotion    Treatment Time/Intention  Discipline: physical therapist (18 0844 : Cari Nina PT)  Document Type: therapy note (daily note) (18 0844 : Cari Nina PT)  Subjective Information: complains of, fatigue, nausea/vomiting (18 0844 : Cari Nina PT)  Mode of Treatment: physical therapy (18 0844 : Cari Nina PT)  Care Plan Review: risks/benefits reviewed, patient/other agree to care plan (18 0844 : Cari Nina PT)  Therapy Frequency (PT Clinical Impression): daily (18 0844 : Cari Nina PT)  Patient Effort: good (18 0844 : Cari Nina PT)  Existing Precautions/Restrictions: cardiac, fall, oxygen therapy device and L/min, sternal (18 0844 : Cari Nina PT)  Plan of Care Review  Plan of Care Reviewed With: patient  [facility]  EMERGENCY DEPARTMENT ENCOUNTER      Pt Name: Irma Huizar  MRN: [id number]  Birthdate 1977  Date of evaluation: 1/28/2025  Provider: Francesco Syed MD    CHIEF COMPLAINT       Chief Complaint   Patient presents with    Anxiety     Pt feeling anxious. Was directed by PO to come to the ED. Was seen here two weeks ago. Pt denies suicidal or homicidal ideations.          HISTORY OF PRESENT ILLNESS      Irma Huizar is a 47 y.o. female who presents to the emergency department via private vehicle, patient states she was told to come to the emergency room by her , patient that she is feeling very anxious, having a lot of stress at home with family, does admit to some methamphetamine use a few days ago, denies any drug or alcohol use today.  Denies suicidal homicidal ideations.  Patient states, other meds were things that need to be changed as they are not working.  Patient was recently seen admitted and discharged from [facility] psychiatry unit in [facility].        REVIEW OF SYSTEMS       Review of Systems   Psychiatric/Behavioral:  Negative for self-injury and suicidal ideas. The patient is nervous/anxious.    All other systems reviewed and are negative.        PAST MEDICAL HISTORY     Past Medical History:   Diagnosis Date    Anxiety     Bipolar disorder (HCC)     Cervical cancer (HCC)     Depression     Heroin abuse (HCC)     Neuropathy          SURGICAL HISTORY       Past Surgical History:   Procedure Laterality Date    CHOLECYSTECTOMY      LAPAROSCOPY           CURRENT MEDICATIONS       Previous Medications    ALBUTEROL SULFATE HFA (PROVENTIL;VENTOLIN;PROAIR) 108 (90 BASE) MCG/ACT INHALER    Inhale 2 puffs into the lungs every 6 hours as needed for Shortness of Breath or Wheezing    CARIPRAZINE HCL (VRAYLAR) 1.5 MG CAPSULE    Take 1 capsule by mouth daily    HYDROXYZINE HCL (ATARAX) 50 MG TABLET    Take 1 tablet by mouth daily as needed for Itching    LAMOTRIGINE  (04/07/18 1025 : Cari N Ring, PT)    Vitals/Pain/Safety  Vital Signs  Pre Systolic BP Rehab: 103 (04/07/18 0844 : Cari N Ring, PT)  Pre Treatment Diastolic BP: 70 (04/07/18 0844 : Cari N Ring, PT)  Post Systolic BP Rehab: 114 (04/07/18 0844 : Cari N Ring, PT)  Post Treatment Diastolic BP: 68 (04/07/18 0844 : Cari N Ring, PT)  Pretreatment Heart Rate (beats/min): 74 (04/07/18 0844 : Cari N Ring, PT)  Posttreatment Heart Rate (beats/min): 73 (04/07/18 0844 : Cari N Ring, PT)  Pre SpO2 (%): 91 (04/07/18 0844 : Cari N Ring, PT)  O2 Delivery Pre Treatment: supplemental O2 (04/07/18 0844 : Cari N Ring, PT)  Post SpO2 (%): 92 (04/07/18 0844 : Cari N Ring, PT)  O2 Delivery Post Treatment: supplemental O2 (04/07/18 0844 : Cari N Ring, PT)  Pre Patient Position: Sitting (04/07/18 0844 : Cari N Ring, PT)  Intra Patient Position: Standing (04/07/18 0844 : Cari N Ring, PT)  Post Patient Position: Supine (04/07/18 0844 : Cari N Ring, PT)  Pain Assessment  Additional Documentation: Pain Scale: Numbers Pre/Post-Treatment (Group) (04/07/18 0844 : Cari N Ring, PT)  Pain Scale: Numbers Pre/Post-Treatment  Pain Scale: Numbers, Pretreatment: 5/10 (04/07/18 0844 : Cari N Ring, PT)  Pain Scale: Numbers, Post-Treatment: 5/10 (04/07/18 0844 : Cari N Ring, PT)  Pain Location: chest (04/07/18 0844 : Cari N Ring, PT)  Pain Scale: Word Pre/Post-Treatment  Pain Location: chest (04/07/18 0844 : Cari N Ring, PT)  Pain Scale: FACES Pre/Post-Treatment  Pain Location: chest (04/07/18 0844 : Cari Nina, PT)  Safety Issues, Functional Mobility  Safety Issues Affecting Function (Mobility): insight into deficits/self awareness, safety precaution awareness, safety precautions follow-through/compliance (04/07/18 0844 : Cari Nina, PT)  Impairments Affecting Function (Mobility): balance, coordination, endurance/activity tolerance, shortness of breath, strength (04/07/18 0844 : Cari Nina,  PT)  Positioning and Restraints  Pre-Treatment Position: sitting in chair/recliner (04/07/18 0844 : Cari Nina PT)  Post Treatment Position: bed (04/07/18 0844 : Cari Nina PT)  In Bed: notified nsg, supine, call light within reach, encouraged to call for assist, side rails up x2, RUE elevated, LUE elevated (04/07/18 0844 : Cari Nina, PT)    Mobility,ADL,Motor, Modality  Bed Mobility Assessment/Treatment  Bed Mobility Assessment/Treatment: sit-supine (04/07/18 0844 : Cari Nina, PT)  Sit-Supine Cotton (Bed Mobility): moderate assist (50% patient effort), 2 person assist, verbal cues (04/07/18 0844 : Cari Nina PT)  Bed Mobility, Safety Issues: decreased use of arms for pushing/pulling (04/07/18 0844 : Cari Nina PT)  Assistive Device (Bed Mobility): draw sheet (04/07/18 0844 : Cari Nina PT)  Comment (Bed Mobility): VC's for sequencing. Pt required assistance at trunk and BLEs.  (04/07/18 0844 : Cari Nina PT)  Transfer Assessment/Treatment  Transfer Assessment/Treatment: sit-stand transfer, stand-sit transfer (04/07/18 0844 : Cari Nina PT)  Comment (Transfers): VC's for sequencing and hand placement to maintain sternal precautions. Pt c/o increased nausea; RN notified.  (04/07/18 0844 : Cari Nina PT)  Sit-Stand Transfer  Sit-Stand Cotton (Transfers): minimum assist (75% patient effort), 2 person assist, verbal cues (04/07/18 0844 : Cari Nina PT)  Stand-Sit Transfer  Stand-Sit Cotton (Transfers): minimum assist (75% patient effort), 2 person assist, verbal cues (04/07/18 0844 : Cari Nina PT)  Gait/Stairs Assessment/Training  Gait/Stairs Assessment/Training: gait/ambulation independence (04/07/18 0844 : Cari Nina PT)  Cotton Level (Gait): minimum assist (75% patient effort), 2 person assist, verbal cues (04/07/18 0844 : Cari Nina, PT)  Distance in Feet (Gait): 100 (04/07/18 0844 : Cari Nina PT)  Pattern (Gait):  step-through (04/07/18 0844 : Cari Nina, PT)  Deviations/Abnormal Patterns (Gait): base of support, wide, kennedy decreased, other (see comments) (decreased step length) (04/07/18 0844 : Cari Nina PT)  Bilateral Gait Deviations: weight shift ability decreased (04/07/18 0844 : Cari Nina PT)  Comment (Gait/Stairs): Pt demonstrated slow kennedy with decreased step length; VC's to correct. Pt declined additional ambulation despite encouragement.  (04/07/18 0844 : Cari Nina, PT)     Motor Skills Assessment/Interventions  Additional Documentation: Balance (Group) (04/07/18 0844 : Cari Nina PT)  Balance  Balance: static sitting balance, static standing balance (04/07/18 0844 : Cari Nina PT)  Static Sitting Balance  Level of Mora (Unsupported Sitting, Static Balance): supervision (04/07/18 0844 : Cari Nina PT)  Sitting Position (Unsupported Sitting, Static Balance): sitting in chair (04/07/18 0844 : Cari Nina PT)  Static Standing Balance  Level of Mora (Supported Standing, Static Balance): minimal assist, 75% patient effort (04/07/18 0844 : Cari Nina PT)        ROM/MMT             Sensory, Edema, Orthotics          Cognition, Communication, Swallow  Cognitive Assessment/Intervention  Additional Documentation: Cognitive Assessment/Intervention (Group) (04/07/18 0844 : Cari Nina PT)  Cognitive Assessment/Intervention- PT/OT  Affect/Mental Status (Cognitive): WFL (04/07/18 0844 : Cari Nina, PT)  Orientation Status (Cognition): oriented x 4 (04/07/18 0844 : Cari Nina PT)  Follows Commands (Cognition): WFL (04/07/18 0844 : Cari Nina PT)  Cognitive Function (Cognitive): WFL (04/07/18 0844 : Cari Nina PT)  Safety Deficit (Cognitive): awareness of need for assistance, insight into deficits/self awareness, safety precautions awareness (04/07/18 0844 : Cari Nina PT)  Personal Safety Interventions: fall prevention program maintained,  gait belt, nonskid shoes/slippers when out of bed (04/07/18 0844 : Cari Nina, PT)  Speaking Valve  Pretreatment Heart Rate (beats/min): 74 (04/07/18 0844 : Cari Nina, PT)  Pre SpO2 (%): 91 (04/07/18 0844 : Cari Nina, PT)  Posttreatment Heart Rate (beats/min): 73 (04/07/18 0844 : Cari Nina, PT)  Post SpO2 (%): 92 (04/07/18 0844 : Cari N Kelle, PT)  General Eating/Swallowing Observations  Pre SpO2 (%): 91 (04/07/18 0844 : Cari N Kelle, PT)  Post SpO2 (%): 92 (04/07/18 0844 : Cari N Kelle, PT)    Outcome Summary  Outcome Summary/Treatment Plan (PT)  Daily Summary of Progress (PT): progress toward functional goals as expected (04/07/18 0844 : Cari Nina, PT)            PT Rehab Goals     Row Name 04/06/18 1045             Bed Mobility Goal 1 (PT)    Activity/Assistive Device (Bed Mobility Goal 1, PT) sit to supine/supine to sit  -PEEWEE      Bryant Level/Cues Needed (Bed Mobility Goal 1, PT) independent  -PEEWEE      Time Frame (Bed Mobility Goal 1, PT) 10 days  -PEEWEE      Progress/Outcomes (Bed Mobility Goal 1, PT) goal ongoing  -PEEWEE         Transfer Goal 1 (PT)    Activity/Assistive Device (Transfer Goal 1, PT) transfers, all  -PEEWEE      Bryant Level/Cues Needed (Transfer Goal 1, PT) independent  -PEEWEE      Time Frame (Transfer Goal 1, PT) 10 days  -PEEWEE      Progress/Outcome (Transfer Goal 1, PT) goal ongoing  -PEEWEE         Gait Training Goal 1 (PT)    Activity/Assistive Device (Gait Training Goal 1, PT) gait (walking locomotion)  -PEEWEE      Bryant Level (Gait Training Goal 1, PT) independent  -PEEWEE      Distance (Gait Goal 1, PT) 400  -PEEWEE      Time Frame (Gait Training Goal 1, PT) 10 days  -PEEWEE      Progress/Outcome (Gait Training Goal 1, PT) goal ongoing  -PEEWEE         Patient Education Goal (PT)    Activity (Patient Education Goal, PT) verbalizes HEP  -PEEWEE      Bryant/Cues/Accuracy (Memory Goal 2, PT) verbalizes understanding  -PEEWEE      Time Frame (Patient Education Goal, PT) 10 days  -PEEWEE       Progress/Outcome (Patient Education Goal, PT) goal ongoing  -PEEWEE        User Key  (r) = Recorded By, (t) = Taken By, (c) = Cosigned By    Initials Name Provider Type    PEEWEE Mcintyre, WARREN Physical Therapist          Physical Therapy Education     Title: PT OT SLP Therapies (Active)     Topic: Physical Therapy (Active)     Point: Mobility training (Active)    Learning Progress Summary     Learner Status Readiness Method Response Comment Documented by    Patient Active Acceptance E NR  KR 04/07/18 1025     Active Acceptance E NR  PEEWEE 04/06/18 1337          Point: Home exercise program (Active)    Learning Progress Summary     Learner Status Readiness Method Response Comment Documented by    Patient Active Acceptance E NR  KR 04/07/18 1025     Active Acceptance E NR  PEEWEE 04/06/18 1337          Point: Body mechanics (Active)    Learning Progress Summary     Learner Status Readiness Method Response Comment Documented by    Patient Active Acceptance E NR  KR 04/07/18 1025     Active Acceptance E NR  PEEWEE 04/06/18 1337          Point: Precautions (Active)    Learning Progress Summary     Learner Status Readiness Method Response Comment Documented by    Patient Active Acceptance E NR  KR 04/07/18 1025     Active Acceptance E NR  PEEWEE 04/06/18 1337                      User Key     Initials Effective Dates Name Provider Type Discipline    PEEWEE 06/19/15 -  Dipti Mcintyre, PT Physical Therapist PT    KR 04/03/18 -  Cari Nina PT Physical Therapist PT                    PT Recommendation and Plan  Therapy Frequency (PT Clinical Impression): daily  Outcome Summary/Treatment Plan (PT)  Daily Summary of Progress (PT): progress toward functional goals as expected  Plan of Care Reviewed With: patient  Progress: no change  Outcome Summary: Pt ambulated 100ft with Susi x2. Pt demonstrated slow kennedy with decreased step length and wide SARMAD. Pt declined additional ambulation despite encouragement. Mobility limited by fatigue  and c/o nausea.           Outcome Measures     Row Name 04/07/18 0844 04/06/18 1045          How much help from another person do you currently need...    Turning from your back to your side while in flat bed without using bedrails? 3  -KR 3  -PEEWEE     Moving from lying on back to sitting on the side of a flat bed without bedrails? 2  -KR 2  -PEEWEE     Moving to and from a bed to a chair (including a wheelchair)? 3  -KR 3  -PEEWEE     Standing up from a chair using your arms (e.g., wheelchair, bedside chair)? 3  -KR 3  -PEEWEE     Climbing 3-5 steps with a railing? 2  -KR 2  -PEEWEE     To walk in hospital room? 3  -KR 3  -PEEWEE     AM-PAC 6 Clicks Score 16  -KR 16  -PEEWEE        Functional Assessment    Outcome Measure Options AM-PAC 6 Clicks Basic Mobility (PT)  -KR AM-PAC 6 Clicks Basic Mobility (PT)  -PEEWEE       User Key  (r) = Recorded By, (t) = Taken By, (c) = Cosigned By    Initials Name Provider Type    PEEWEE Mcintyre, PT Physical Therapist    KR Cari Nina, PT Physical Therapist           Time Calculation:         PT Charges     Row Name 04/07/18 1027             Time Calculation    Start Time 0844  -KR      PT Received On 04/07/18  -KR      PT Goal Re-Cert Due Date 04/16/18  -KR         Time Calculation- PT    Total Timed Code Minutes- PT 23 minute(s)  -KR        User Key  (r) = Recorded By, (t) = Taken By, (c) = Cosigned By    Initials Name Provider Type    LEXI Nina, PT Physical Therapist          Therapy Charges for Today     Code Description Service Date Service Provider Modifiers Qty    07231876601 HC PT THER PROC EA 15 MIN 4/7/2018 Cari Nina, PT GP 2    82863329397 HC PT THER SUPP EA 15 MIN 4/7/2018 Cari Nina, PT GP 2          PT G-Codes  Outcome Measure Options: AM-PAC 6 Clicks Basic Mobility (PT)    Ella Nina, PT  4/7/2018

## 2025-02-05 DIAGNOSIS — F51.01 PRIMARY INSOMNIA: ICD-10-CM

## 2025-02-05 RX ORDER — ZOLPIDEM TARTRATE 12.5 MG/1
12.5 TABLET, FILM COATED, EXTENDED RELEASE ORAL NIGHTLY PRN
Qty: 30 TABLET | Refills: 2 | Status: SHIPPED | OUTPATIENT
Start: 2025-02-05

## 2025-02-05 NOTE — TELEPHONE ENCOUNTER
Rx Refill Note    Requested Prescriptions     Pending Prescriptions Disp Refills    zolpidem CR (AMBIEN CR) 12.5 MG CR tablet [Pharmacy Med Name: ZOLPIDEM ER 12.5MG TABLETS] 30 tablet      Sig: TAKE 1 TABLET BY MOUTH AT NIGHT AS NEEDED FOR SLEEP        Last office visit with prescribing clinician: 11/20/2024      Next office visit with prescribing clinician: 2/20/2025   Last labs:   Last refill: 11/20/2024   Pharmacy (be sure to add in Epic). correct

## 2025-02-10 DIAGNOSIS — K21.9 GASTROESOPHAGEAL REFLUX DISEASE WITHOUT ESOPHAGITIS: ICD-10-CM

## 2025-02-10 RX ORDER — OMEPRAZOLE 40 MG/1
40 CAPSULE, DELAYED RELEASE ORAL
Qty: 180 CAPSULE | Refills: 0 | Status: SHIPPED | OUTPATIENT
Start: 2025-02-10

## 2025-02-25 RX ORDER — LEVOTHYROXINE SODIUM 50 UG/1
50 TABLET ORAL DAILY
Qty: 30 TABLET | Refills: 0 | Status: SHIPPED | OUTPATIENT
Start: 2025-02-25

## 2025-03-18 ENCOUNTER — LAB (OUTPATIENT)
Dept: FAMILY MEDICINE CLINIC | Facility: CLINIC | Age: 75
End: 2025-03-18
Payer: MEDICARE

## 2025-03-18 DIAGNOSIS — N18.31 CHRONIC KIDNEY DISEASE, STAGE 3A: ICD-10-CM

## 2025-03-18 DIAGNOSIS — F51.01 PRIMARY INSOMNIA: ICD-10-CM

## 2025-03-18 DIAGNOSIS — G89.29 CHRONIC MIDLINE LOW BACK PAIN WITHOUT SCIATICA: ICD-10-CM

## 2025-03-18 DIAGNOSIS — Z00.00 MEDICARE ANNUAL WELLNESS VISIT, SUBSEQUENT: ICD-10-CM

## 2025-03-18 DIAGNOSIS — L03.313 CELLULITIS OF CHEST WALL: ICD-10-CM

## 2025-03-18 DIAGNOSIS — M25.552 LEFT HIP PAIN: ICD-10-CM

## 2025-03-18 DIAGNOSIS — K21.9 GASTROESOPHAGEAL REFLUX DISEASE WITHOUT ESOPHAGITIS: ICD-10-CM

## 2025-03-18 DIAGNOSIS — E78.2 HYPERLIPIDEMIA, MIXED: ICD-10-CM

## 2025-03-18 DIAGNOSIS — I25.10 CORONARY ARTERY DISEASE INVOLVING NATIVE HEART WITHOUT ANGINA PECTORIS, UNSPECIFIED VESSEL OR LESION TYPE: ICD-10-CM

## 2025-03-18 DIAGNOSIS — D64.9 ANEMIA, UNSPECIFIED TYPE: ICD-10-CM

## 2025-03-18 DIAGNOSIS — Z00.00 PHYSICAL EXAM: ICD-10-CM

## 2025-03-18 DIAGNOSIS — R73.9 HYPERGLYCEMIA: ICD-10-CM

## 2025-03-18 DIAGNOSIS — E03.9 ACQUIRED HYPOTHYROIDISM: ICD-10-CM

## 2025-03-18 DIAGNOSIS — M54.50 CHRONIC MIDLINE LOW BACK PAIN WITHOUT SCIATICA: ICD-10-CM

## 2025-03-18 DIAGNOSIS — I73.9 PAD (PERIPHERAL ARTERY DISEASE): ICD-10-CM

## 2025-03-19 LAB
25(OH)D3+25(OH)D2 SERPL-MCNC: 64.9 NG/ML (ref 30–100)
ALBUMIN SERPL-MCNC: 4.4 G/DL (ref 3.8–4.8)
ALP SERPL-CCNC: 91 IU/L (ref 44–121)
ALT SERPL-CCNC: 12 IU/L (ref 0–44)
AST SERPL-CCNC: 17 IU/L (ref 0–40)
BASOPHILS # BLD AUTO: 0.1 X10E3/UL (ref 0–0.2)
BASOPHILS NFR BLD AUTO: 1 %
BILIRUB SERPL-MCNC: 0.4 MG/DL (ref 0–1.2)
BUN SERPL-MCNC: 23 MG/DL (ref 8–27)
BUN/CREAT SERPL: 15 (ref 10–24)
CALCIUM SERPL-MCNC: 9.7 MG/DL (ref 8.6–10.2)
CHLORIDE SERPL-SCNC: 99 MMOL/L (ref 96–106)
CHOLEST SERPL-MCNC: 178 MG/DL (ref 100–199)
CO2 SERPL-SCNC: 21 MMOL/L (ref 20–29)
CREAT SERPL-MCNC: 1.53 MG/DL (ref 0.76–1.27)
EGFRCR SERPLBLD CKD-EPI 2021: 47 ML/MIN/1.73
EOSINOPHIL # BLD AUTO: 0.4 X10E3/UL (ref 0–0.4)
EOSINOPHIL NFR BLD AUTO: 7 %
ERYTHROCYTE [DISTWIDTH] IN BLOOD BY AUTOMATED COUNT: 12.6 % (ref 11.6–15.4)
GLOBULIN SER CALC-MCNC: 2.2 G/DL (ref 1.5–4.5)
GLUCOSE SERPL-MCNC: 101 MG/DL (ref 70–99)
HBA1C MFR BLD: 5.8 % (ref 4.8–5.6)
HCT VFR BLD AUTO: 35.9 % (ref 37.5–51)
HDLC SERPL-MCNC: 43 MG/DL
HGB BLD-MCNC: 11.9 G/DL (ref 13–17.7)
IMM GRANULOCYTES # BLD AUTO: 0 X10E3/UL (ref 0–0.1)
IMM GRANULOCYTES NFR BLD AUTO: 0 %
LDLC SERPL CALC-MCNC: 109 MG/DL (ref 0–99)
LYMPHOCYTES # BLD AUTO: 1.4 X10E3/UL (ref 0.7–3.1)
LYMPHOCYTES NFR BLD AUTO: 23 %
MCH RBC QN AUTO: 31.1 PG (ref 26.6–33)
MCHC RBC AUTO-ENTMCNC: 33.1 G/DL (ref 31.5–35.7)
MCV RBC AUTO: 94 FL (ref 79–97)
MONOCYTES # BLD AUTO: 0.7 X10E3/UL (ref 0.1–0.9)
MONOCYTES NFR BLD AUTO: 12 %
NEUTROPHILS # BLD AUTO: 3.4 X10E3/UL (ref 1.4–7)
NEUTROPHILS NFR BLD AUTO: 57 %
PLATELET # BLD AUTO: 207 X10E3/UL (ref 150–450)
POTASSIUM SERPL-SCNC: 4.7 MMOL/L (ref 3.5–5.2)
PROT SERPL-MCNC: 6.6 G/DL (ref 6–8.5)
RBC # BLD AUTO: 3.83 X10E6/UL (ref 4.14–5.8)
SODIUM SERPL-SCNC: 134 MMOL/L (ref 134–144)
TRIGL SERPL-MCNC: 148 MG/DL (ref 0–149)
TSH SERPL DL<=0.005 MIU/L-ACNC: 3.59 UIU/ML (ref 0.45–4.5)
VIT B12 SERPL-MCNC: 806 PG/ML (ref 232–1245)
VLDLC SERPL CALC-MCNC: 26 MG/DL (ref 5–40)
WBC # BLD AUTO: 5.9 X10E3/UL (ref 3.4–10.8)

## 2025-03-20 RX ORDER — CETIRIZINE HYDROCHLORIDE 10 MG/1
10 TABLET ORAL DAILY
Qty: 90 TABLET | Refills: 1 | Status: SHIPPED | OUTPATIENT
Start: 2025-03-20

## 2025-03-21 NOTE — PROGRESS NOTES
Patient Name: Kavon Waters Jr.  : 1950   MRN: [id number]     Chief Complaint:    Chief Complaint   Patient presents with    Hypertension    Hypothyroidism    Med Refill       History of Present Illness: Kavon Waters Jr. is a 74 y.o. male who is here today for follow up on blood sugar, kidney function, cholesterol and vitamin D.  HPI        Review of Systems:   Review of Systems   Constitutional: Negative.    HENT: Negative.     Eyes: Negative.    Respiratory: Negative.     Cardiovascular: Negative.    Gastrointestinal: Negative.    Neurological: Negative.         Past Medical History:   Past Medical History:   Diagnosis Date    Acquired hypothyroidism     Allergies     Arrhythmia     Arthritis     Atrial fibrillation     AV block, 1st degree     Borderline diabetes     BPH associated with nocturia     Chronic idiopathic constipation     CKD (chronic kidney disease), stage III     Constipation     Coronary artery disease involving native coronary artery of native heart without angina pectoris     Depression     Disease of thyroid gland     Eczema     Essential hypertension     Full dentures     GERD (gastroesophageal reflux disease)     H/O heart artery stent     X 7    High risk medication use     Ischemic cardiomyopathy     Long term (current) use of anticoagulants     Low sodium levels     Lower back pain     Mixed hyperlipidemia     PAUL (obstructive sleep apnea)     NO CPAP USE    Paroxysmal A-fib     Primary osteoarthritis involving multiple joints     Prostate cancer     HAD 43 RADIATION TREATMENTS.    Prostatic hypertrophy     Benign    RBBB     Recurrent major depressive disorder, in full remission     Seasonal allergies     Skin cancer     Stroke     AFFECTED RIGHT ARM/LEG.  PT DENIES RESIDUAL DEFICITS.    Thyroid disease     Wears glasses        Past Surgical History:   Past Surgical History:   Procedure Laterality Date    CARDIAC CATHETERIZATION      CARDIAC CATHETERIZATION N/A 2018     Procedure: Left Heart Cath;  Surgeon: Moshe Ahn MD;  Location:  [facility];  Service: Cardiovascular    CHOLECYSTECTOMY      CIRCUMCISION  2005    COLONOSCOPY  2013    CORONARY ARTERY BYPASS GRAFT N/A 2018    Procedure: MEDIASTERNOTOMY CORONARY ARTERY BYPASS GRAFT X2 UTILIZING LEFT INTERNAL MAMMARY ARTERY, EVH LEFT GREATER SAPHENOUS VEIN;  Surgeon: Rakesh Mittal MD;  Location:  [facility];  Service: Cardiothoracic    CORONARY STENT PLACEMENT      SKIN CANCER EXCISION         Family History:   Family History   Problem Relation Age of Onset    Kidney failure Mother         COMPLICATIONS OF DIALYSIS       Social History:   Social History     Socioeconomic History    Marital status:      Spouse name: Mara    Number of children: 2    Highest education level: 9th grade   Tobacco Use    Smoking status: Former     Current packs/day: 0.00     Average packs/day: 1 pack/day for 23.3 years (23.3 ttl pk-yrs)     Types: Cigarettes     Start date: 1965     Quit date: 1988     Years since quittin.0     Passive exposure: Past    Smokeless tobacco: Never    Tobacco comments:     QUIT 30 YEARS AGO   Vaping Use    Vaping status: Never Used   Substance and Sexual Activity    Alcohol use: No    Drug use: No    Sexual activity: Not Currently       Medications:     Current Outpatient Medications:     alfuzosin (UROXATRAL) 10 MG 24 hr tablet, Take 1 tablet by mouth Daily., Disp: , Rfl:     amLODIPine (NORVASC) 5 MG tablet, TAKE 1 TABLET BY MOUTH EVERY DAY, Disp: 90 tablet, Rfl: 1    aspirin 81 MG chewable tablet, Chew 1 tablet Daily., Disp: , Rfl:     bicalutamide (CASODEX) 50 MG chemo tablet, Take 1 tablet by mouth Daily., Disp: , Rfl:     cetirizine (zyrTEC) 10 MG tablet, TAKE 1 TABLET BY MOUTH EVERY DAY, Disp: 90 tablet, Rfl: 1    clopidogrel (PLAVIX) 75 MG tablet, TAKE 1 TABLET BY MOUTH EVERY DAY, Disp: 90 tablet, Rfl: 1    ezetimibe (ZETIA) 10 MG tablet, Take 1 tablet  by mouth Daily., Disp: 90 tablet, Rfl: 1    fluticasone (CUTIVATE) 0.05 % cream, Apply 1 Application topically to the appropriate area as directed Daily., Disp: , Rfl:     fluticasone (FLONASE) 50 MCG/ACT nasal spray, USE 2 SPRAYS IN EACH NOSTRIL AS DIRECTED DAILY, Disp: 48 g, Rfl: 1    ketoconazole (NIZORAL) 2 % shampoo, APPLY TOPICALLY TO THE AFFECTED AREA 1 TIME EVERY WEEK AS DIRECTED, Disp: 120 mL, Rfl: 0    levothyroxine (SYNTHROID, LEVOTHROID) 50 MCG tablet, TAKE 1 TABLET BY MOUTH DAILY, Disp: 30 tablet, Rfl: 0    liothyronine (CYTOMEL) 5 MCG tablet, TAKE 1 TABLET BY MOUTH TWICE DAILY, Disp: 180 tablet, Rfl: 1    losartan (COZAAR) 100 MG tablet, Take 1 tablet by mouth Daily., Disp: 90 tablet, Rfl: 1    lubiprostone (AMITIZA) 24 MCG capsule, Take 1 capsule by mouth 2 (Two) Times a Day With Meals., Disp: , Rfl:     methocarbamol (ROBAXIN) 750 MG tablet, Take 1 tablet by mouth 2 (Two) Times a Day., Disp: 180 tablet, Rfl: 1    omeprazole (priLOSEC) 40 MG capsule, TAKE 1 CAPSULE BY MOUTH TWICE DAILY BEFORE MEALS, Disp: 180 capsule, Rfl: 0    Repatha SureClick solution auto-injector SureClick injection, ADMINISTER 1 ML UNDER THE SKIN EVERY 14 DAYS AS DIRECTED, Disp: 2 mL, Rfl: 5    traMADol (ULTRAM) 50 MG tablet, Take 1 tablet by mouth Every 8 (Eight) Hours As Needed for Moderate Pain., Disp: 90 tablet, Rfl: 2    triamcinolone (KENALOG) 0.1 % ointment, Apply 1 Application topically to the appropriate area as directed 2 (Two) Times a Day., Disp: , Rfl:     zolpidem CR (AMBIEN CR) 12.5 MG CR tablet, Take 1 tablet by mouth At Night As Needed for Sleep., Disp: 30 tablet, Rfl: 2    Allergies:   Allergies   Allergen Reactions    Ibuprofen Other (See Comments)     Instructed not to take due to his kidneys         Physical Exam:  Vital Signs:   Vitals:    03/24/25 1358   BP: 142/68   BP Location: Left arm   Patient Position: Sitting   Cuff Size: Adult   Pulse: 63   SpO2: 98%   Weight: 97.3 kg (214 lb 6.4 oz)     Body mass  index is 29.08 kg/m².     Physical Exam  Vitals and nursing note reviewed.   Constitutional:       Appearance: Normal appearance. He is normal weight.   HENT:      Head: Normocephalic and atraumatic.      Right Ear: Tympanic membrane, ear canal and external ear normal.      Left Ear: Tympanic membrane, ear canal and external ear normal.      Nose: Nose normal.      Mouth/Throat:      Mouth: Mucous membranes are dry.      Pharynx: Oropharynx is clear.   Eyes:      Extraocular Movements: Extraocular movements intact.      Conjunctiva/sclera: Conjunctivae normal.      Pupils: Pupils are equal, round, and reactive to light.   Cardiovascular:      Rate and Rhythm: Normal rate and regular rhythm.      Pulses: Normal pulses.      Heart sounds: Normal heart sounds.   Pulmonary:      Effort: Pulmonary effort is normal.      Breath sounds: Normal breath sounds.   Musculoskeletal:      Cervical back: Normal range of motion and neck supple.   Feet:      Comments:      Neurological:      Mental Status: He is alert.         Procedures      Assessment/Plan:   Diagnoses and all orders for this visit:    1. Essential hypertension (Primary)  Assessment & Plan:  Discussed with patient to monitor their blood pressure and if systolic blood pressure goes above 140 or diastolic is above 90 to return to clinic.  Take medicines as directed, call for any problems, patient not having or any worrisome symptoms.        Orders:  -     Hemoglobin A1c; Future  -     Vitamin B12; Future  -     Vitamin D,25-Hydroxy; Future  -     Lipid Panel; Future  -     Comprehensive Metabolic Panel; Future  -     TSH Rfx On Abnormal To Free T4; Future  -     CBC & Differential; Future  -     Measles / Mumps / Rubella Immunity; Future  -     Hemoglobin A1c; Future  -     Lipid Panel; Future  -     Comprehensive Metabolic Panel; Future  -     Vitamin B12; Future  -     Vitamin D,25-Hydroxy; Future  -     TSH Rfx On Abnormal To Free T4; Future  -     CBC &  Differential; Future    2. Hyperlipidemia, mixed  Assessment & Plan:  Patient is on Repatha.  I am going to start him on Zetia.  HDL 43.  .    Orders:  -     traMADol (ULTRAM) 50 MG tablet; Take 1 tablet by mouth Every 8 (Eight) Hours As Needed for Moderate Pain.  Dispense: 90 tablet; Refill: 2  -     Hemoglobin A1c; Future  -     Vitamin B12; Future  -     Vitamin D,25-Hydroxy; Future  -     Lipid Panel; Future  -     Comprehensive Metabolic Panel; Future  -     TSH Rfx On Abnormal To Free T4; Future  -     CBC & Differential; Future  -     Measles / Mumps / Rubella Immunity; Future  -     Hemoglobin A1c; Future  -     Lipid Panel; Future  -     Comprehensive Metabolic Panel; Future  -     Vitamin B12; Future  -     Vitamin D,25-Hydroxy; Future  -     TSH Rfx On Abnormal To Free T4; Future  -     CBC & Differential; Future    3. Acquired hypothyroidism  Assessment & Plan:  TSH is 3.590.  Recheck in 6 months.    Orders:  -     traMADol (ULTRAM) 50 MG tablet; Take 1 tablet by mouth Every 8 (Eight) Hours As Needed for Moderate Pain.  Dispense: 90 tablet; Refill: 2  -     Hemoglobin A1c; Future  -     Vitamin B12; Future  -     Vitamin D,25-Hydroxy; Future  -     Lipid Panel; Future  -     Comprehensive Metabolic Panel; Future  -     TSH Rfx On Abnormal To Free T4; Future  -     CBC & Differential; Future  -     Measles / Mumps / Rubella Immunity; Future  -     Hemoglobin A1c; Future  -     Lipid Panel; Future  -     Comprehensive Metabolic Panel; Future  -     Vitamin B12; Future  -     Vitamin D,25-Hydroxy; Future  -     TSH Rfx On Abnormal To Free T4; Future  -     CBC & Differential; Future    4. Anemia, unspecified type  Assessment & Plan:  Hematocrit are stable.    Orders:  -     Hemoglobin A1c; Future  -     Vitamin B12; Future  -     Vitamin D,25-Hydroxy; Future  -     Lipid Panel; Future  -     Comprehensive Metabolic Panel; Future  -     TSH Rfx On Abnormal To Free T4; Future  -     CBC & Differential;  Future  -     Measles / Mumps / Rubella Immunity; Future  -     Hemoglobin A1c; Future  -     Lipid Panel; Future  -     Comprehensive Metabolic Panel; Future  -     Vitamin B12; Future  -     Vitamin D,25-Hydroxy; Future  -     TSH Rfx On Abnormal To Free T4; Future  -     CBC & Differential; Future    5. Coronary artery disease involving native coronary artery of native heart without angina pectoris  Assessment & Plan:  Aggressive risk factor modification    Orders:  -     Hemoglobin A1c; Future  -     Vitamin B12; Future  -     Vitamin D,25-Hydroxy; Future  -     Lipid Panel; Future  -     Comprehensive Metabolic Panel; Future  -     TSH Rfx On Abnormal To Free T4; Future  -     CBC & Differential; Future  -     Measles / Mumps / Rubella Immunity; Future  -     Hemoglobin A1c; Future  -     Lipid Panel; Future  -     Comprehensive Metabolic Panel; Future  -     Vitamin B12; Future  -     Vitamin D,25-Hydroxy; Future  -     TSH Rfx On Abnormal To Free T4; Future  -     CBC & Differential; Future    6. Chronic kidney disease, stage 3a  Assessment & Plan:  GFR 47.Patient is instructed to not take any NSAIDs.  Medicines as directed.  Stay well-hydrated.      Orders:  -     traMADol (ULTRAM) 50 MG tablet; Take 1 tablet by mouth Every 8 (Eight) Hours As Needed for Moderate Pain.  Dispense: 90 tablet; Refill: 2  -     Hemoglobin A1c; Future  -     Vitamin B12; Future  -     Vitamin D,25-Hydroxy; Future  -     Lipid Panel; Future  -     Comprehensive Metabolic Panel; Future  -     TSH Rfx On Abnormal To Free T4; Future  -     CBC & Differential; Future  -     Measles / Mumps / Rubella Immunity; Future  -     Hemoglobin A1c; Future  -     Lipid Panel; Future  -     Comprehensive Metabolic Panel; Future  -     Vitamin B12; Future  -     Vitamin D,25-Hydroxy; Future  -     TSH Rfx On Abnormal To Free T4; Future  -     CBC & Differential; Future    7. Primary insomnia  Assessment & Plan:  Refill Ambien.  House bill  1.    Orders:  -     zolpidem CR (AMBIEN CR) 12.5 MG CR tablet; Take 1 tablet by mouth At Night As Needed for Sleep.  Dispense: 30 tablet; Refill: 2  -     traMADol (ULTRAM) 50 MG tablet; Take 1 tablet by mouth Every 8 (Eight) Hours As Needed for Moderate Pain.  Dispense: 90 tablet; Refill: 2  -     Hemoglobin A1c; Future  -     Vitamin B12; Future  -     Vitamin D,25-Hydroxy; Future  -     Lipid Panel; Future  -     Comprehensive Metabolic Panel; Future  -     TSH Rfx On Abnormal To Free T4; Future  -     CBC & Differential; Future  -     Measles / Mumps / Rubella Immunity; Future  -     Hemoglobin A1c; Future  -     Lipid Panel; Future  -     Comprehensive Metabolic Panel; Future  -     Vitamin B12; Future  -     Vitamin D,25-Hydroxy; Future  -     TSH Rfx On Abnormal To Free T4; Future  -     CBC & Differential; Future    8. Hyperglycemia  -     traMADol (ULTRAM) 50 MG tablet; Take 1 tablet by mouth Every 8 (Eight) Hours As Needed for Moderate Pain.  Dispense: 90 tablet; Refill: 2  -     Hemoglobin A1c; Future  -     Vitamin B12; Future  -     Vitamin D,25-Hydroxy; Future  -     Lipid Panel; Future  -     Comprehensive Metabolic Panel; Future  -     TSH Rfx On Abnormal To Free T4; Future  -     CBC & Differential; Future  -     Measles / Mumps / Rubella Immunity; Future  -     Hemoglobin A1c; Future  -     Lipid Panel; Future  -     Comprehensive Metabolic Panel; Future  -     Vitamin B12; Future  -     Vitamin D,25-Hydroxy; Future  -     TSH Rfx On Abnormal To Free T4; Future  -     CBC & Differential; Future    9. Chronic midline low back pain without sciatica  -     traMADol (ULTRAM) 50 MG tablet; Take 1 tablet by mouth Every 8 (Eight) Hours As Needed for Moderate Pain.  Dispense: 90 tablet; Refill: 2  -     Hemoglobin A1c; Future  -     Vitamin B12; Future  -     Vitamin D,25-Hydroxy; Future  -     Lipid Panel; Future  -     Comprehensive Metabolic Panel; Future  -     TSH Rfx On Abnormal To Free T4;  Future  -     CBC & Differential; Future  -     Measles / Mumps / Rubella Immunity; Future  -     Hemoglobin A1c; Future  -     Lipid Panel; Future  -     Comprehensive Metabolic Panel; Future  -     Vitamin B12; Future  -     Vitamin D,25-Hydroxy; Future  -     TSH Rfx On Abnormal To Free T4; Future  -     CBC & Differential; Future    10. Cellulitis of chest wall  -     traMADol (ULTRAM) 50 MG tablet; Take 1 tablet by mouth Every 8 (Eight) Hours As Needed for Moderate Pain.  Dispense: 90 tablet; Refill: 2  -     Hemoglobin A1c; Future  -     Vitamin B12; Future  -     Vitamin D,25-Hydroxy; Future  -     Lipid Panel; Future  -     Comprehensive Metabolic Panel; Future  -     TSH Rfx On Abnormal To Free T4; Future  -     CBC & Differential; Future  -     Measles / Mumps / Rubella Immunity; Future  -     Hemoglobin A1c; Future  -     Lipid Panel; Future  -     Comprehensive Metabolic Panel; Future  -     Vitamin B12; Future  -     Vitamin D,25-Hydroxy; Future  -     TSH Rfx On Abnormal To Free T4; Future  -     CBC & Differential; Future    11. Coronary artery disease involving native heart without angina pectoris, unspecified vessel or lesion type  Assessment & Plan:  Aggressive risk factor modification    Orders:  -     traMADol (ULTRAM) 50 MG tablet; Take 1 tablet by mouth Every 8 (Eight) Hours As Needed for Moderate Pain.  Dispense: 90 tablet; Refill: 2  -     Hemoglobin A1c; Future  -     Vitamin B12; Future  -     Vitamin D,25-Hydroxy; Future  -     Lipid Panel; Future  -     Comprehensive Metabolic Panel; Future  -     TSH Rfx On Abnormal To Free T4; Future  -     CBC & Differential; Future  -     Measles / Mumps / Rubella Immunity; Future  -     Hemoglobin A1c; Future  -     Lipid Panel; Future  -     Comprehensive Metabolic Panel; Future  -     Vitamin B12; Future  -     Vitamin D,25-Hydroxy; Future  -     TSH Rfx On Abnormal To Free T4; Future  -     CBC & Differential; Future    12. Cervicalgia  Assessment  & Plan:  Patient's pain is getting worse.  He wants his tramadol increased to 3 times a day.  I will go ahead and do this.  Sandeep bill 1.  He did get x-ray at an outside facility and I do not have those results.  He states that he will drop this report off for me.    Orders:  -     Hemoglobin A1c; Future  -     Vitamin B12; Future  -     Vitamin D,25-Hydroxy; Future  -     Lipid Panel; Future  -     Comprehensive Metabolic Panel; Future  -     TSH Rfx On Abnormal To Free T4; Future  -     CBC & Differential; Future  -     Measles / Mumps / Rubella Immunity; Future  -     Hemoglobin A1c; Future  -     Lipid Panel; Future  -     Comprehensive Metabolic Panel; Future  -     Vitamin B12; Future  -     Vitamin D,25-Hydroxy; Future  -     TSH Rfx On Abnormal To Free T4; Future  -     CBC & Differential; Future             Follow Up:   Return in about 3 months (around 6/24/2025) for Annual physical, Bloodwork 1 week prior to next appointment.      Mina Story MD  [facility]

## 2025-03-24 ENCOUNTER — OFFICE VISIT (OUTPATIENT)
Dept: FAMILY MEDICINE CLINIC | Facility: CLINIC | Age: 75
End: 2025-03-24
Payer: MEDICARE

## 2025-03-24 VITALS
HEART RATE: 63 BPM | BODY MASS INDEX: 29.08 KG/M2 | DIASTOLIC BLOOD PRESSURE: 68 MMHG | OXYGEN SATURATION: 98 % | SYSTOLIC BLOOD PRESSURE: 142 MMHG | WEIGHT: 214.4 LBS

## 2025-03-24 DIAGNOSIS — I25.10 CORONARY ARTERY DISEASE INVOLVING NATIVE HEART WITHOUT ANGINA PECTORIS, UNSPECIFIED VESSEL OR LESION TYPE: ICD-10-CM

## 2025-03-24 DIAGNOSIS — F51.01 PRIMARY INSOMNIA: ICD-10-CM

## 2025-03-24 DIAGNOSIS — I25.10 CORONARY ARTERY DISEASE INVOLVING NATIVE CORONARY ARTERY OF NATIVE HEART WITHOUT ANGINA PECTORIS: ICD-10-CM

## 2025-03-24 DIAGNOSIS — L03.313 CELLULITIS OF CHEST WALL: ICD-10-CM

## 2025-03-24 DIAGNOSIS — E78.2 HYPERLIPIDEMIA, MIXED: ICD-10-CM

## 2025-03-24 DIAGNOSIS — M54.50 CHRONIC MIDLINE LOW BACK PAIN WITHOUT SCIATICA: ICD-10-CM

## 2025-03-24 DIAGNOSIS — D64.9 ANEMIA, UNSPECIFIED TYPE: ICD-10-CM

## 2025-03-24 DIAGNOSIS — N18.31 CHRONIC KIDNEY DISEASE, STAGE 3A: ICD-10-CM

## 2025-03-24 DIAGNOSIS — R73.9 HYPERGLYCEMIA: ICD-10-CM

## 2025-03-24 DIAGNOSIS — M54.2 CERVICALGIA: ICD-10-CM

## 2025-03-24 DIAGNOSIS — I10 ESSENTIAL HYPERTENSION: Primary | ICD-10-CM

## 2025-03-24 DIAGNOSIS — E03.9 ACQUIRED HYPOTHYROIDISM: ICD-10-CM

## 2025-03-24 DIAGNOSIS — G89.29 CHRONIC MIDLINE LOW BACK PAIN WITHOUT SCIATICA: ICD-10-CM

## 2025-03-24 RX ORDER — TRAMADOL HYDROCHLORIDE 50 MG/1
50 TABLET ORAL EVERY 8 HOURS PRN
Qty: 90 TABLET | Refills: 2 | Status: SHIPPED | OUTPATIENT
Start: 2025-03-24

## 2025-03-24 RX ORDER — ZOLPIDEM TARTRATE 12.5 MG/1
12.5 TABLET, FILM COATED, EXTENDED RELEASE ORAL NIGHTLY PRN
Qty: 30 TABLET | Refills: 2 | Status: SHIPPED | OUTPATIENT
Start: 2025-03-24

## 2025-03-24 NOTE — ASSESSMENT & PLAN NOTE
Patient's pain is getting worse.  He wants his tramadol increased to 3 times a day.  I will go ahead and do this.  House bill 1.  He did get x-ray at an outside facility and I do not have those results.  He states that he will drop this report off for me.

## 2025-03-30 DIAGNOSIS — I73.9 PAD (PERIPHERAL ARTERY DISEASE): ICD-10-CM

## 2025-03-31 RX ORDER — CLOPIDOGREL BISULFATE 75 MG/1
75 TABLET ORAL DAILY
Qty: 90 TABLET | Refills: 1 | Status: SHIPPED | OUTPATIENT
Start: 2025-03-31

## 2025-03-31 RX ORDER — LEVOTHYROXINE SODIUM 50 UG/1
50 TABLET ORAL DAILY
Qty: 90 TABLET | Refills: 1 | Status: SHIPPED | OUTPATIENT
Start: 2025-03-31

## 2025-04-08 RX ORDER — FLUTICASONE PROPIONATE 0.05 %
1 CREAM (GRAM) TOPICAL DAILY
Qty: 30 G | Refills: 0 | Status: SHIPPED | OUTPATIENT
Start: 2025-04-08

## 2025-04-08 NOTE — TELEPHONE ENCOUNTER
Caller: Kavon Waters Jr.    Relationship: Self    Best call back number: [phone]     Requested Prescriptions:   Requested Prescriptions     Pending Prescriptions Disp Refills    fluticasone (CUTIVATE) 0.05 % cream       Sig: Apply 1 Application topically to the appropriate area as directed Daily.        Pharmacy where request should be sent: [id number] [place] [address] [address] AT [facility] [address] [facility] [phone] [facility] [phone]      Last office visit with prescribing clinician: 3/24/2025   Last telemedicine visit with prescribing clinician: Visit date not found   Next office visit with prescribing clinician: 6/25/2025     Additional details provided by patient: PLEASE CALL THIS IN OR CALL IF ANY ISSUES FILLING     Does the patient have less than a 3 day supply:  [x] Yes  [] No    Would you like a call back once the refill request has been completed: [] Yes [x] No    If the office needs to give you a call back, can they leave a voicemail: [] Yes [x] No    Salma Arboleda Rep   04/08/25 09:11 EDT

## 2025-04-26 NOTE — ASSESSMENT & PLAN NOTE
A1c is 5.8.  Recheck in 6 months.  
Aggressive risk factor modification  
Discussed with patient to monitor their blood pressure and if systolic blood pressure goes above 140 or diastolic is above 90 to return to clinic.  Take medicines as directed, call for any problems, patient not having or any worrisome symptoms.      
GFR is 58.Patient is instructed to not take any NSAIDs.  Medicines as directed.  Stay well-hydrated.    
HDL 48.  .  Patient on Repatha.  
TSH is 2.620.  Recheck in 6 months.  
Well on meds.  Will follow.  
no...

## 2025-05-01 RX ORDER — EVOLOCUMAB 140 MG/ML
INJECTION, SOLUTION SUBCUTANEOUS
Qty: 2 ML | Refills: 0 | Status: SHIPPED | OUTPATIENT
Start: 2025-05-01

## 2025-05-01 NOTE — TELEPHONE ENCOUNTER
Rx Refill Note    Requested Prescriptions     Pending Prescriptions Disp Refills    Evolocumab (Repatha SureClick) solution auto-injector SureClick injection [Pharmacy Med Name: REPATHA SRCLK 140MG/ML PF AUTO INJ] 2 mL 0     Sig: ADMINISTER 1 ML UNDER THE SKIN EVERY 14 DAYS AS DIRECTED        Last office visit with prescribing clinician: 3/24/2025      Next office visit with prescribing clinician: 6/25/2025   Last labs:   Last refill: 10/14/2024   Pharmacy (be sure to add in Epic). correct

## 2025-05-06 DIAGNOSIS — K21.9 GASTROESOPHAGEAL REFLUX DISEASE WITHOUT ESOPHAGITIS: ICD-10-CM

## 2025-05-06 RX ORDER — OMEPRAZOLE 40 MG/1
40 CAPSULE, DELAYED RELEASE ORAL
Qty: 180 CAPSULE | Refills: 0 | Status: SHIPPED | OUTPATIENT
Start: 2025-05-06

## 2025-05-06 NOTE — TELEPHONE ENCOUNTER
Rx Refill Note    Requested Prescriptions     Pending Prescriptions Disp Refills    omeprazole (priLOSEC) 40 MG capsule [Pharmacy Med Name: OMEPRAZOLE 40MG CAPSULES] 180 capsule 0     Sig: TAKE 1 CAPSULE BY MOUTH TWICE DAILY BEFORE MEALS        Last office visit with prescribing clinician: 3/24/2025      Next office visit with prescribing clinician: 6/25/2025   Last labs:   Last refill: 02/10/2025   Pharmacy (be sure to add in Epic). correct

## 2025-05-30 RX ORDER — EVOLOCUMAB 140 MG/ML
INJECTION, SOLUTION SUBCUTANEOUS
Qty: 2 ML | Refills: 0 | Status: SHIPPED | OUTPATIENT
Start: 2025-05-30

## 2025-05-30 NOTE — TELEPHONE ENCOUNTER
Rx Refill Note    Requested Prescriptions     Pending Prescriptions Disp Refills    Repatha SureClick solution auto-injector SureClick injection [Pharmacy Med Name: REPATHA SURCLK 140MG/ML INJ LATEX] 2 mL 0     Sig: ADMINISTER 1 ML UNDER THE SKIN EVERY 14 DAYS AS DIRECTED        Last office visit with prescribing clinician: 3/24/2025      Next office visit with prescribing clinician: 6/25/2025   Last labs:   Last refill: 05/01/2025   Pharmacy (be sure to add in Epic). Correct      Dr. Story patient

## 2025-06-16 ENCOUNTER — HOSPITAL ENCOUNTER (EMERGENCY)
Facility: HOSPITAL | Age: 75
Discharge: HOME | End: 2025-06-16
Attending: EMERGENCY MEDICINE | Admitting: EMERGENCY MEDICINE
Payer: MEDICARE

## 2025-06-16 ENCOUNTER — APPOINTMENT (OUTPATIENT)
Dept: CT IMAGING | Facility: HOSPITAL | Age: 75
End: 2025-06-16
Payer: MEDICARE

## 2025-06-16 ENCOUNTER — APPOINTMENT (OUTPATIENT)
Dept: GENERAL RADIOLOGY | Facility: HOSPITAL | Age: 75
End: 2025-06-16
Payer: MEDICARE

## 2025-06-16 VITALS
SYSTOLIC BLOOD PRESSURE: 130 MMHG | RESPIRATION RATE: 12 BRPM | OXYGEN SATURATION: 99 % | HEIGHT: 72 IN | HEART RATE: 66 BPM | BODY MASS INDEX: 29.53 KG/M2 | TEMPERATURE: 97.7 F | WEIGHT: 218 LBS | DIASTOLIC BLOOD PRESSURE: 67 MMHG

## 2025-06-16 DIAGNOSIS — R55 NEAR SYNCOPE: ICD-10-CM

## 2025-06-16 DIAGNOSIS — E87.1 HYPONATREMIA: ICD-10-CM

## 2025-06-16 DIAGNOSIS — E86.0 DEHYDRATION: Primary | ICD-10-CM

## 2025-06-16 DIAGNOSIS — R79.89 ELEVATED SERUM CREATININE: ICD-10-CM

## 2025-06-16 LAB
ALBUMIN SERPL-MCNC: 3.6 G/DL (ref 3.5–5.2)
ALBUMIN/GLOB SERPL: 1.4 G/DL
ALP SERPL-CCNC: 66 U/L (ref 39–117)
ALT SERPL W P-5'-P-CCNC: 41 U/L (ref 1–41)
ANION GAP SERPL CALCULATED.3IONS-SCNC: 12 MMOL/L (ref 5–15)
AST SERPL-CCNC: 51 U/L (ref 1–40)
B PARAPERT DNA SPEC QL NAA+PROBE: NOT DETECTED
B PERT DNA SPEC QL NAA+PROBE: NOT DETECTED
BACTERIA UR QL AUTO: ABNORMAL /HPF
BASOPHILS # BLD AUTO: 0.02 10*3/MM3 (ref 0–0.2)
BASOPHILS NFR BLD AUTO: 0.9 % (ref 0–1.5)
BILIRUB SERPL-MCNC: 0.4 MG/DL (ref 0–1.2)
BILIRUB UR QL STRIP: ABNORMAL
BUN SERPL-MCNC: 29.3 MG/DL (ref 8–23)
BUN/CREAT SERPL: 14.8 (ref 7–25)
C PNEUM DNA NPH QL NAA+NON-PROBE: NOT DETECTED
CALCIUM SPEC-SCNC: 8.3 MG/DL (ref 8.6–10.5)
CHLORIDE SERPL-SCNC: 93 MMOL/L (ref 98–107)
CLARITY UR: CLEAR
CO2 SERPL-SCNC: 23 MMOL/L (ref 22–29)
COARSE GRAN CASTS URNS QL MICRO: ABNORMAL /LPF
COLOR UR: ABNORMAL
CREAT SERPL-MCNC: 1.98 MG/DL (ref 0.76–1.27)
D-LACTATE SERPL-SCNC: 1.8 MMOL/L (ref 0.5–2)
DEPRECATED RDW RBC AUTO: 42.6 FL (ref 37–54)
EGFRCR SERPLBLD CKD-EPI 2021: 34.6 ML/MIN/1.73
EOSINOPHIL # BLD AUTO: 0 10*3/MM3 (ref 0–0.4)
EOSINOPHIL NFR BLD AUTO: 0 % (ref 0.3–6.2)
ERYTHROCYTE [DISTWIDTH] IN BLOOD BY AUTOMATED COUNT: 12.9 % (ref 12.3–15.4)
FLUAV SUBTYP SPEC NAA+PROBE: NOT DETECTED
FLUBV RNA ISLT QL NAA+PROBE: NOT DETECTED
GEN 5 1HR TROPONIN T REFLEX: 18 NG/L
GLOBULIN UR ELPH-MCNC: 2.5 GM/DL
GLUCOSE SERPL-MCNC: 156 MG/DL (ref 65–99)
GLUCOSE UR STRIP-MCNC: NEGATIVE MG/DL
HADV DNA SPEC NAA+PROBE: NOT DETECTED
HCOV 229E RNA SPEC QL NAA+PROBE: NOT DETECTED
HCOV HKU1 RNA SPEC QL NAA+PROBE: NOT DETECTED
HCOV NL63 RNA SPEC QL NAA+PROBE: NOT DETECTED
HCOV OC43 RNA SPEC QL NAA+PROBE: NOT DETECTED
HCT VFR BLD AUTO: 31.9 % (ref 37.5–51)
HGB BLD-MCNC: 11.1 G/DL (ref 13–17.7)
HGB UR QL STRIP.AUTO: NEGATIVE
HMPV RNA NPH QL NAA+NON-PROBE: NOT DETECTED
HOLD SPECIMEN: NORMAL
HPIV1 RNA ISLT QL NAA+PROBE: NOT DETECTED
HPIV2 RNA SPEC QL NAA+PROBE: NOT DETECTED
HPIV3 RNA NPH QL NAA+PROBE: NOT DETECTED
HPIV4 P GENE NPH QL NAA+PROBE: NOT DETECTED
HYALINE CASTS UR QL AUTO: ABNORMAL /LPF
IMM GRANULOCYTES # BLD AUTO: 0.01 10*3/MM3 (ref 0–0.05)
IMM GRANULOCYTES NFR BLD AUTO: 0.4 % (ref 0–0.5)
KETONES UR QL STRIP: ABNORMAL
LEUKOCYTE ESTERASE UR QL STRIP.AUTO: ABNORMAL
LYMPHOCYTES # BLD AUTO: 0.57 10*3/MM3 (ref 0.7–3.1)
LYMPHOCYTES NFR BLD AUTO: 24.8 % (ref 19.6–45.3)
M PNEUMO IGG SER IA-ACNC: NOT DETECTED
MAGNESIUM SERPL-MCNC: 1.8 MG/DL (ref 1.6–2.4)
MCH RBC QN AUTO: 31.3 PG (ref 26.6–33)
MCHC RBC AUTO-ENTMCNC: 34.8 G/DL (ref 31.5–35.7)
MCV RBC AUTO: 89.9 FL (ref 79–97)
MONOCYTES # BLD AUTO: 0.4 10*3/MM3 (ref 0.1–0.9)
MONOCYTES NFR BLD AUTO: 17.4 % (ref 5–12)
NEUTROPHILS NFR BLD AUTO: 1.3 10*3/MM3 (ref 1.7–7)
NEUTROPHILS NFR BLD AUTO: 56.5 % (ref 42.7–76)
NITRITE UR QL STRIP: NEGATIVE
NRBC BLD AUTO-RTO: 0 /100 WBC (ref 0–0.2)
PH UR STRIP.AUTO: <=5 [PH] (ref 5–8)
PLAT MORPH BLD: NORMAL
PLATELET # BLD AUTO: 118 10*3/MM3 (ref 140–450)
PMV BLD AUTO: 10 FL (ref 6–12)
POTASSIUM SERPL-SCNC: 3.8 MMOL/L (ref 3.5–5.2)
PROCALCITONIN SERPL-MCNC: 0.57 NG/ML (ref 0–0.25)
PROT SERPL-MCNC: 6.1 G/DL (ref 6–8.5)
PROT UR QL STRIP: ABNORMAL
QT INTERVAL: 468 MS
QTC INTERVAL: 490 MS
RBC # BLD AUTO: 3.55 10*6/MM3 (ref 4.14–5.8)
RBC # UR STRIP: ABNORMAL /HPF
RBC MORPH BLD: NORMAL
REF LAB TEST METHOD: ABNORMAL
RHINOVIRUS RNA SPEC NAA+PROBE: NOT DETECTED
RSV RNA NPH QL NAA+NON-PROBE: NOT DETECTED
SARS-COV-2 RNA RESP QL NAA+PROBE: NOT DETECTED
SODIUM SERPL-SCNC: 128 MMOL/L (ref 136–145)
SP GR UR STRIP: 1.02 (ref 1–1.03)
SQUAMOUS #/AREA URNS HPF: ABNORMAL /HPF
TROPONIN T NUMERIC DELTA: -1 NG/L
TROPONIN T SERPL HS-MCNC: 19 NG/L
UROBILINOGEN UR QL STRIP: ABNORMAL
WBC # UR STRIP: ABNORMAL /HPF
WBC MORPH BLD: NORMAL
WBC NRBC COR # BLD AUTO: 2.3 10*3/MM3 (ref 3.4–10.8)
WHOLE BLOOD HOLD COAG: NORMAL
WHOLE BLOOD HOLD SPECIMEN: NORMAL

## 2025-06-16 PROCEDURE — 99284 EMERGENCY DEPT VISIT MOD MDM: CPT

## 2025-06-16 PROCEDURE — 80053 COMPREHEN METABOLIC PANEL: CPT | Performed by: EMERGENCY MEDICINE

## 2025-06-16 PROCEDURE — 71045 X-RAY EXAM CHEST 1 VIEW: CPT

## 2025-06-16 PROCEDURE — 85025 COMPLETE CBC W/AUTO DIFF WBC: CPT | Performed by: EMERGENCY MEDICINE

## 2025-06-16 PROCEDURE — 83735 ASSAY OF MAGNESIUM: CPT | Performed by: EMERGENCY MEDICINE

## 2025-06-16 PROCEDURE — 84484 ASSAY OF TROPONIN QUANT: CPT | Performed by: EMERGENCY MEDICINE

## 2025-06-16 PROCEDURE — 25810000003 SODIUM CHLORIDE 0.9 % SOLUTION: Performed by: EMERGENCY MEDICINE

## 2025-06-16 PROCEDURE — 87040 BLOOD CULTURE FOR BACTERIA: CPT | Performed by: EMERGENCY MEDICINE

## 2025-06-16 PROCEDURE — 0202U NFCT DS 22 TRGT SARS-COV-2: CPT | Performed by: EMERGENCY MEDICINE

## 2025-06-16 PROCEDURE — 93005 ELECTROCARDIOGRAM TRACING: CPT

## 2025-06-16 PROCEDURE — 85007 BL SMEAR W/DIFF WBC COUNT: CPT | Performed by: EMERGENCY MEDICINE

## 2025-06-16 PROCEDURE — 81001 URINALYSIS AUTO W/SCOPE: CPT | Performed by: EMERGENCY MEDICINE

## 2025-06-16 PROCEDURE — 84145 PROCALCITONIN (PCT): CPT | Performed by: EMERGENCY MEDICINE

## 2025-06-16 PROCEDURE — 70450 CT HEAD/BRAIN W/O DYE: CPT

## 2025-06-16 PROCEDURE — 83605 ASSAY OF LACTIC ACID: CPT | Performed by: EMERGENCY MEDICINE

## 2025-06-16 PROCEDURE — 36415 COLL VENOUS BLD VENIPUNCTURE: CPT

## 2025-06-16 PROCEDURE — 93005 ELECTROCARDIOGRAM TRACING: CPT | Performed by: EMERGENCY MEDICINE

## 2025-06-16 RX ORDER — SODIUM CHLORIDE 1 G/1
1 TABLET ORAL 3 TIMES DAILY
Qty: 30 TABLET | Refills: 0 | Status: SHIPPED | OUTPATIENT
Start: 2025-06-16

## 2025-06-16 RX ORDER — SODIUM CHLORIDE 0.9 % (FLUSH) 0.9 %
10 SYRINGE (ML) INJECTION AS NEEDED
Status: DISCONTINUED | OUTPATIENT
Start: 2025-06-16 | End: 2025-06-16 | Stop reason: HOSPADM

## 2025-06-16 RX ADMIN — SODIUM CHLORIDE 1000 ML: 9 INJECTION, SOLUTION INTRAVENOUS at 14:45

## 2025-06-16 NOTE — ED PROVIDER NOTES
EMERGENCY DEPARTMENT ENCOUNTER    Pt Name: Kavon Waters Jr.  MRN: [id number]  Pt :   1950  Room Number:  15/15  Date of encounter:  2025  PCP: Mina Story MD  ED Provider: Rashad Finn MD    Historian: Patient, multiple family members, EMS      HPI:  Chief Complaint: Generalized weakness, lightheadedness        Context: Kavon Waters Jr. is a 74 y.o. male who presents to the ED c/o generalized weakness and lightheadedness ongoing over the last 3 days.  4 days ago the patient had a fall on his porch and may have struck his head.  No loss of consciousness.  The patient currently has suffered from staring spells especially over the last 2 days.  Today he was at an urgent treatment center and in the upright position when he developed lightheadedness and had shaking of upper extremities lasting roughly 15 seconds without marked decreased mental status afterward.  Yesterday he apparently had a temperature of 101.7.  His last dose of antipyretic was Tylenol at 9 PM last night.  Patient was given a course of antibiotics roughly 2 weeks ago secondary to acute otitis media.      PAST MEDICAL HISTORY  Past Medical History:   Diagnosis Date    Acquired hypothyroidism     Allergies     Arrhythmia     Arthritis     Atrial fibrillation     AV block, 1st degree     Borderline diabetes     BPH associated with nocturia     Chronic idiopathic constipation     CKD (chronic kidney disease), stage III     Constipation     Coronary artery disease involving native coronary artery of native heart without angina pectoris     Depression     Disease of thyroid gland     Eczema     Essential hypertension     Full dentures     GERD (gastroesophageal reflux disease)     H/O heart artery stent     X 7    High risk medication use     Ischemic cardiomyopathy     Long term (current) use of anticoagulants     Low sodium levels     Lower back pain     Mixed hyperlipidemia     PAUL (obstructive sleep apnea)     NO CPAP USE     Paroxysmal A-fib     Primary osteoarthritis involving multiple joints     Prostate cancer     HAD 43 RADIATION TREATMENTS.    Prostatic hypertrophy     Benign    RBBB     Recurrent major depressive disorder, in full remission     Seasonal allergies     Skin cancer     Stroke     AFFECTED RIGHT ARM/LEG.  PT DENIES RESIDUAL DEFICITS.    Thyroid disease     Wears glasses          PAST SURGICAL HISTORY  Past Surgical History:   Procedure Laterality Date    CARDIAC CATHETERIZATION      CARDIAC CATHETERIZATION N/A 2018    Procedure: Left Heart Cath;  Surgeon: Moshe Ahn MD;  Location:  [facility];  Service: Cardiovascular    CHOLECYSTECTOMY      CIRCUMCISION      COLONOSCOPY  2013    CORONARY ARTERY BYPASS GRAFT N/A 2018    Procedure: MEDIASTERNOTOMY CORONARY ARTERY BYPASS GRAFT X2 UTILIZING LEFT INTERNAL MAMMARY ARTERY, EVH LEFT GREATER SAPHENOUS VEIN;  Surgeon: Rakesh Mittal MD;  Location:  [facility];  Service: Cardiothoracic    CORONARY STENT PLACEMENT      SKIN CANCER EXCISION           FAMILY HISTORY  Family History   Problem Relation Age of Onset    Kidney failure Mother         COMPLICATIONS OF DIALYSIS         SOCIAL HISTORY  Social History     Socioeconomic History    Marital status:      Spouse name: Mara    Number of children: 2    Highest education level: 9th grade   Tobacco Use    Smoking status: Former     Current packs/day: 0.00     Average packs/day: 1 pack/day for 23.3 years (23.3 ttl pk-yrs)     Types: Cigarettes     Start date: 1965     Quit date: 1988     Years since quittin.2     Passive exposure: Past    Smokeless tobacco: Never    Tobacco comments:     QUIT 30 YEARS AGO   Vaping Use    Vaping status: Never Used   Substance and Sexual Activity    Alcohol use: No    Drug use: No    Sexual activity: Not Currently         ALLERGIES  Ibuprofen        REVIEW OF SYSTEMS  Review of Systems       All systems reviewed and negative  except for those discussed in HPI.       PHYSICAL EXAM    I have reviewed the triage vital signs and nursing notes.    ED Triage Vitals   Temp Heart Rate Resp BP SpO2   06/16/25 1152 06/16/25 1149 06/16/25 1149 06/16/25 1149 06/16/25 1149   97.7 °F (36.5 °C) 64 12 129/79 97 %      Temp src Heart Rate Source Patient Position BP Location FiO2 (%)   -- -- 06/16/25 1330 -- --     Lying         Physical Exam  GENERAL:   Appears weak but nontoxic  HENT: Nares patent.  Very dry mucous membrane  EYES: No scleral icterus.  CV: Regular rhythm, regular rate.  No murmurs gallops rubs  RESPIRATORY: Normal effort.  No audible wheezes, rales or rhonchi.  Clear to auscultation  ABDOMEN: Soft, nontender to deep palpation  MUSCULOSKELETAL: No deformities.   NEURO: Alert, moves all extremities, follows commands.  Oriented x 3 with clear speech.  No pronator drift or leg drift  SKIN: Warm, dry, no rash visualized.      LAB RESULTS  Recent Results (from the past 24 hours)   ECG 12 Lead Syncope    Collection Time: 06/16/25 11:56 AM   Result Value Ref Range    QT Interval 468 ms    QTC Interval 490 ms   Comprehensive Metabolic Panel    Collection Time: 06/16/25 12:07 PM    Specimen: Blood   Result Value Ref Range    Glucose 156 (H) 65 - 99 mg/dL    BUN 29.3 (H) 8.0 - 23.0 mg/dL    Creatinine 1.98 (H) 0.76 - 1.27 mg/dL    Sodium 128 (L) 136 - 145 mmol/L    Potassium 3.8 3.5 - 5.2 mmol/L    Chloride 93 (L) 98 - 107 mmol/L    CO2 23.0 22.0 - 29.0 mmol/L    Calcium 8.3 (L) 8.6 - 10.5 mg/dL    Total Protein 6.1 6.0 - 8.5 g/dL    Albumin 3.6 3.5 - 5.2 g/dL    ALT (SGPT) 41 1 - 41 U/L    AST (SGOT) 51 (H) 1 - 40 U/L    Alkaline Phosphatase 66 39 - 117 U/L    Total Bilirubin 0.4 0.0 - 1.2 mg/dL    Globulin 2.5 gm/dL    A/G Ratio 1.4 g/dL    BUN/Creatinine Ratio 14.8 7.0 - 25.0    Anion Gap 12.0 5.0 - 15.0 mmol/L    eGFR 34.6 (L) >60.0 mL/min/1.73   Magnesium    Collection Time: 06/16/25 12:07 PM    Specimen: Blood   Result Value Ref Range     Magnesium 1.8 1.6 - 2.4 mg/dL   High Sensitivity Troponin T    Collection Time: 06/16/25 12:07 PM    Specimen: Blood   Result Value Ref Range    HS Troponin T 19 <22 ng/L   Green Top (Gel)    Collection Time: 06/16/25 12:07 PM   Result Value Ref Range    Extra Tube Hold for add-ons.    Lavender Top    Collection Time: 06/16/25 12:07 PM   Result Value Ref Range    Extra Tube hold for add-on    Gold Top - SST    Collection Time: 06/16/25 12:07 PM   Result Value Ref Range    Extra Tube Hold for add-ons.    Gray Top    Collection Time: 06/16/25 12:07 PM   Result Value Ref Range    Extra Tube Hold for add-ons.    Light Blue Top    Collection Time: 06/16/25 12:07 PM   Result Value Ref Range    Extra Tube Hold for add-ons.    CBC Auto Differential    Collection Time: 06/16/25 12:07 PM    Specimen: Blood   Result Value Ref Range    WBC 2.30 (L) 3.40 - 10.80 10*3/mm3    RBC 3.55 (L) 4.14 - 5.80 10*6/mm3    Hemoglobin 11.1 (L) 13.0 - 17.7 g/dL    Hematocrit 31.9 (L) 37.5 - 51.0 %    MCV 89.9 79.0 - 97.0 fL    MCH 31.3 26.6 - 33.0 pg    MCHC 34.8 31.5 - 35.7 g/dL    RDW 12.9 12.3 - 15.4 %    RDW-SD 42.6 37.0 - 54.0 fl    MPV 10.0 6.0 - 12.0 fL    Platelets 118 (L) 140 - 450 10*3/mm3    Neutrophil % 56.5 42.7 - 76.0 %    Lymphocyte % 24.8 19.6 - 45.3 %    Monocyte % 17.4 (H) 5.0 - 12.0 %    Eosinophil % 0.0 (L) 0.3 - 6.2 %    Basophil % 0.9 0.0 - 1.5 %    Immature Grans % 0.4 0.0 - 0.5 %    Neutrophils, Absolute 1.30 (L) 1.70 - 7.00 10*3/mm3    Lymphocytes, Absolute 0.57 (L) 0.70 - 3.10 10*3/mm3    Monocytes, Absolute 0.40 0.10 - 0.90 10*3/mm3    Eosinophils, Absolute 0.00 0.00 - 0.40 10*3/mm3    Basophils, Absolute 0.02 0.00 - 0.20 10*3/mm3    Immature Grans, Absolute 0.01 0.00 - 0.05 10*3/mm3    nRBC 0.0 0.0 - 0.2 /100 WBC   Scan Slide    Collection Time: 06/16/25 12:07 PM    Specimen: Blood   Result Value Ref Range    RBC Morphology Normal Normal    WBC Morphology Normal Normal    Platelet Morphology Normal Normal   Lactic  Acid, Plasma    Collection Time: 06/16/25 12:07 PM    Specimen: Blood   Result Value Ref Range    Lactate 1.8 0.5 - 2.0 mmol/L   ECG 12 Lead Syncope    Collection Time: 06/16/25  1:03 PM   Result Value Ref Range    QT Interval 476 ms    QTC Interval 487 ms   High Sensitivity Troponin T 1Hr    Collection Time: 06/16/25  1:30 PM    Specimen: Blood   Result Value Ref Range    HS Troponin T 18 <22 ng/L    Troponin T Numeric Delta -1 Abnormal if >/=3 ng/L   Procalcitonin    Collection Time: 06/16/25  1:30 PM    Specimen: Blood   Result Value Ref Range    Procalcitonin 0.57 (H) 0.00 - 0.25 ng/mL   Urinalysis With Culture If Indicated - Urine, Clean Catch    Collection Time: 06/16/25  2:26 PM    Specimen: Urine, Clean Catch   Result Value Ref Range    Color, UA Dark Yellow (A) Yellow, Straw    Appearance, UA Clear Clear    pH, UA <=5.0 5.0 - 8.0    Specific Gravity, UA 1.024 1.001 - 1.030    Glucose, UA Negative Negative    Ketones, UA Trace (A) Negative    Bilirubin, UA Small (1+) (A) Negative    Blood, UA Negative Negative    Protein, UA 30 mg/dL (1+) (A) Negative    Leuk Esterase, UA Trace (A) Negative    Nitrite, UA Negative Negative    Urobilinogen, UA 1.0 E.U./dL 0.2 - 1.0 E.U./dL   Respiratory Panel PCR w/COVID-19(SARS-CoV-2) [facility] In-House, NP Swab in [facility], 2 HR TAT - Swab, Nasopharynx    Collection Time: 06/16/25  2:26 PM    Specimen: Nasopharynx; Swab   Result Value Ref Range    ADENOVIRUS, PCR Not Detected Not Detected    Coronavirus 229E Not Detected Not Detected    Coronavirus HKU1 Not Detected Not Detected    Coronavirus NL63 Not Detected Not Detected    Coronavirus OC43 Not Detected Not Detected    COVID19 Not Detected Not Detected - Ref. Range    Human Metapneumovirus Not Detected Not Detected    Human Rhinovirus/Enterovirus Not Detected Not Detected    Influenza A PCR Not Detected Not Detected    Influenza B PCR Not Detected Not Detected    Parainfluenza Virus 1 Not Detected Not  Detected    Parainfluenza Virus 2 Not Detected Not Detected    Parainfluenza Virus 3 Not Detected Not Detected    Parainfluenza Virus 4 Not Detected Not Detected    RSV, PCR Not Detected Not Detected    Bordetella pertussis pcr Not Detected Not Detected    Bordetella parapertussis PCR Not Detected Not Detected    Chlamydophila pneumoniae PCR Not Detected Not Detected    Mycoplasma pneumo by PCR Not Detected Not Detected   Urinalysis, Microscopic Only - Urine, Clean Catch    Collection Time: 06/16/25  2:26 PM    Specimen: Urine, Clean Catch   Result Value Ref Range    RBC, UA 3-5 (A) None Seen, 0-2 /HPF    WBC, UA 0-2 None Seen, 0-2 /HPF    Bacteria, UA None Seen None Seen, Trace /HPF    Squamous Epithelial Cells, UA 0-2 None Seen, 0-2 /HPF    Hyaline Casts, UA 7-12 0 - 6 /LPF    Coarse Granular Casts, UA 0-2 None Seen /LPF    Methodology Manual Light Microscopy        If labs were ordered, I independently reviewed the results and considered them in treating the patient.        RADIOLOGY  CT Head Without Contrast  Result Date: 6/16/2025  CT HEAD WO CONTRAST Date of Exam: 6/16/2025 3:41 PM EDT Indication: ams s/p fall 4 d ago. Comparison: Brain MRI 1/21/2015, head CT 1/21/2015. Technique: Axial CT images were obtained of the head without contrast administration.  Automated exposure control and iterative construction methods were used. Findings: No evidence of acute intracranial hemorrhage or mass effect. No extra-axial collection. The gray white matter differentiation is preserved. Global parenchymal atrophy. Periventricular and subcortical white matter hypodensity, nonspecific, but likely sequela of chronic small vessel ischemic disease. Evidence of remote infarct in the left periventricular white matter. The mastoid air cells and paranasal sinuses are well aerated. Globes and extraocular muscles are unremarkable. No acute or suspicious osseous abnormality. Soft tissues within normal limits.     Impression: No acute  intracranial findings. Chronic findings as above. Electronically Signed: Silvio Mena MD  6/16/2025 3:53 PM EDT  Workstation ID: [id number]    XR Chest 1 View  Result Date: 6/16/2025  XR CHEST 1 VW Date of Exam: 6/16/2025 1:55 PM EDT Indication: fever Comparison: Chest radiograph 4/7/2018. Findings: Mildly enlarged cardiac silhouette. Sternotomy, CABG, coronary stent. No focal airspace consolidation. No pleural effusion or pneumothorax. Thoracic spondylosis.     Impression: No acute findings. Electronically Signed: Silvio Mena MD  6/16/2025 2:35 PM EDT  Workstation ID: [id number]      I ordered and independently reviewed the above noted radiographic studies.      I viewed images of CT and chest x-ray head which showed no acute abnormalities per my independent interpretation.    See radiologist's dictation for official interpretation.        PROCEDURES    Procedures    ECG 12 Lead Syncope   Preliminary Result   Test Reason : Syncope   Blood Pressure :   */*   mmHG   Vent. Rate :  63 BPM     Atrial Rate :  63 BPM      P-R Int : 226 ms          QRS Dur : 146 ms       QT Int : 476 ms       P-R-T Axes :  22 -52  12 degrees     QTcB Int : 487 ms      Sinus rhythm with 1st degree AV block   Right bundle branch block   Left anterior fascicular block   ** Bifascicular block **   Moderate voltage criteria for LVH, may be normal variant   Possible Lateral infarct (cited on or before 16-Jun-2025)   Abnormal ECG   When compared with ECG of 16-Jun-2025 11:56, (Unconfirmed)   No significant change was found      Referred By: ED MD           Confirmed By:       ECG 12 Lead Syncope   Final Result   Test Reason : Syncope   Blood Pressure :   */*   mmHG   Vent. Rate :  66 BPM     Atrial Rate :  66 BPM      P-R Int : 214 ms          QRS Dur : 146 ms       QT Int : 468 ms       P-R-T Axes :  18 -52  14 degrees     QTcB Int : 490 ms      Sinus rhythm with 1st degree AV block   Right bundle branch block   Left anterior fascicular block    ** Bifascicular block **   Moderate voltage criteria for LVH, may be normal variant   Possible Lateral infarct , age undetermined   Abnormal ECG   Confirmed by MYA HILL MD ([id number]) on 6/16/2025 1:35:05 PM      Referred By: ED MD           Confirmed By: MAY HILL MD          MEDICATIONS GIVEN IN ER    Medications   sodium chloride 0.9 % flush 10 mL (has no administration in time range)   sodium chloride 0.9 % bolus 1,000 mL (0 mL Intravenous Stopped 6/16/25 1630)         MEDICAL DECISION MAKING, PROGRESS, and CONSULTS    All labs, if obtained, have been independently reviewed by me.  All radiology studies, if obtained, have been reviewed by me and the radiologist dictating the report.  All EKGs, if obtained, have been independently viewed and interpreted by me/my attending physician.      Discussion below represents my analysis of pertinent findings related to patient's condition, differential diagnosis, treatment plan and final disposition.                                          Differential diagnosis:    Consider dehydration, seizure, sepsis, etc.      Additional sources:    - Discussed/ obtained information from independent historians: I spoke with multiple family members at bedside.  Paramedics gave report at bedside.    - External (non-ED) record review: I reviewed last office note dated 3/24/2025 when patient was in for evaluation of hypertension, hypothyroidism and a medication refill    - Chronic or social conditions impacting care: Former smoker        Orders placed during this visit:  Orders Placed This Encounter   Procedures    COVID PRE-OP / PRE-PROCEDURE SCREENING ORDER (NO ISOLATION) - Swab, Nasopharynx    Respiratory Panel PCR w/COVID-19(SARS-CoV-2) [facility] In-House, NP Swab in UTM/VTM, 2 HR TAT - Swab, Nasopharynx    Blood Culture - Blood,    Blood Culture - Blood,    CT Head Without Contrast    XR Chest 1 View    [place] Draw    Comprehensive Metabolic Panel    Magnesium     High Sensitivity Troponin T    CBC Auto Differential    Scan Slide    High Sensitivity Troponin T 1Hr    Urinalysis With Culture If Indicated - Urine, Clean Catch    Lactic Acid, Plasma    Procalcitonin    Urinalysis, Microscopic Only - Urine, Clean Catch    NPO Diet NPO Type: Strict NPO    Undress & Gown    Continuous Pulse Oximetry    Vital Signs    Orthostatic Blood Pressure    Oxygen Therapy- Nasal Cannula; Titrate 1-6 LPM Per SpO2; 90 - 95%    ECG 12 Lead Syncope    Insert Peripheral IV    CBC & Differential    Green Top (Gel)    Lavender Top    Gold Top - SST    Gray Top    Light Blue Top         Additional orders considered but not ordered:  CTA head    ED Course:    Consultants:      ED Course as of 06/16/25 1634   Mon Jun 16, 2025   1400 Orthos   142/74 ,61 lying  133/72, 66 sitting  119/73, 70 standing    Positive orthostatics   [MS]   1430 I ordered a liter of normal saline IV fluid bolus. [MS]   1549 Color, UA(!): Dark Yellow [MS]   1550 Ketones, UA(!): Trace [MS]   1550 Bilirubin, UA(!): Small (1+) [MS]   1620 Given report of fevers I have ordered blood cultures.  They have been. [MS]   1631 I have hydrated the patient with the liter of normal saline.  I spoke with the patient and his wife about discharge versus admission and both are quite comfortable with discharge to home.  Patient would prefer this.  Patient was on salt tablets previously secondary to hyponatremia.  I will restart him on those.  I have encouraged him to drink plenty of electrolyte fluids.  They understand the need to return immediately if worse [MS]      ED Course User Index  [MS] Rashad Finn MD              Shared Decision Making:  After my consideration of clinical presentation and any laboratory/radiology studies obtained, I discussed the findings with the patient/patient representative who is in agreement with the treatment plan and the final disposition.   Risks and benefits of discharge and/or observation/admission were  discussed.       AS OF 16:34 EDT VITALS:    BP - 134/76  HR - 60  TEMP - 97.7 °F (36.5 °C)  O2 SATS - 97%                  DIAGNOSIS  Final diagnoses:   Dehydration   Hyponatremia   Elevated serum creatinine   Near syncope         DISPOSITION  DISCHARGE    Patient discharged in stable condition.    Reviewed implications of results, diagnosis, meds, responsibility to follow up, warning signs and symptoms of possible worsening, potential complications and reasons to return to ER.    Patient/Family voiced understanding of above instructions.    Discussed plan for discharge, as there is no emergent indication for admission.  Pt/family is agreeable and understands need for follow up and possible repeat testing.  Pt/family is aware that discharge does not mean that nothing is wrong but that it indicates no emergency is currently present that requires admission and they must continue care with follow-up as given below or with a physician of their choice.     FOLLOW-UP  Mina Story MD  [address] [place] [id number] [phone]      NEXT AVAILABLE APPOINTMENT - RECHECK OF CONDITION    [facility] EMERGENCY DEPARTMENT  [address] [facility] [address] [phone]    IF YOU HAVE ANY CONCERN OF WORSENING CONDITION         Medication List        New Prescriptions      sodium chloride 1 g tablet  Take 1 tablet by mouth 3 (Three) Times a Day.               Where to Get Your Medications        These medications were sent to [id number] - [place], [place] - [address] AT [facility] [address] - [phone]  - [phone] FX  [address] [address]      Phone: [phone]   sodium chloride 1 g tablet             Please note that portions of this document were completed with voice recognition software.        Rashad Finn MD  06/16/25 2004

## 2025-06-18 ENCOUNTER — LAB (OUTPATIENT)
Dept: FAMILY MEDICINE CLINIC | Facility: CLINIC | Age: 75
End: 2025-06-18
Payer: MEDICARE

## 2025-06-18 DIAGNOSIS — M54.2 CERVICALGIA: ICD-10-CM

## 2025-06-18 DIAGNOSIS — I25.10 CORONARY ARTERY DISEASE INVOLVING NATIVE HEART WITHOUT ANGINA PECTORIS, UNSPECIFIED VESSEL OR LESION TYPE: ICD-10-CM

## 2025-06-18 DIAGNOSIS — L03.313 CELLULITIS OF CHEST WALL: ICD-10-CM

## 2025-06-18 DIAGNOSIS — I25.10 CORONARY ARTERY DISEASE INVOLVING NATIVE CORONARY ARTERY OF NATIVE HEART WITHOUT ANGINA PECTORIS: ICD-10-CM

## 2025-06-18 DIAGNOSIS — D64.9 ANEMIA, UNSPECIFIED TYPE: ICD-10-CM

## 2025-06-18 DIAGNOSIS — F51.01 PRIMARY INSOMNIA: ICD-10-CM

## 2025-06-18 DIAGNOSIS — I10 ESSENTIAL HYPERTENSION: ICD-10-CM

## 2025-06-18 DIAGNOSIS — R73.9 HYPERGLYCEMIA: ICD-10-CM

## 2025-06-18 DIAGNOSIS — N18.31 CHRONIC KIDNEY DISEASE, STAGE 3A: ICD-10-CM

## 2025-06-18 DIAGNOSIS — G89.29 CHRONIC MIDLINE LOW BACK PAIN WITHOUT SCIATICA: ICD-10-CM

## 2025-06-18 DIAGNOSIS — E78.2 HYPERLIPIDEMIA, MIXED: ICD-10-CM

## 2025-06-18 DIAGNOSIS — M54.50 CHRONIC MIDLINE LOW BACK PAIN WITHOUT SCIATICA: ICD-10-CM

## 2025-06-18 DIAGNOSIS — E03.9 ACQUIRED HYPOTHYROIDISM: ICD-10-CM

## 2025-06-19 LAB
25(OH)D3+25(OH)D2 SERPL-MCNC: 58.5 NG/ML (ref 30–100)
ALBUMIN SERPL-MCNC: 3.8 G/DL (ref 3.8–4.8)
ALP SERPL-CCNC: 73 IU/L (ref 44–121)
ALT SERPL-CCNC: 32 IU/L (ref 0–44)
AST SERPL-CCNC: 37 IU/L (ref 0–40)
BASOPHILS # BLD AUTO: 0.1 X10E3/UL (ref 0–0.2)
BASOPHILS NFR BLD AUTO: 2 %
BILIRUB SERPL-MCNC: 0.4 MG/DL (ref 0–1.2)
BUN SERPL-MCNC: 17 MG/DL (ref 8–27)
BUN/CREAT SERPL: 11 (ref 10–24)
CALCIUM SERPL-MCNC: 9.3 MG/DL (ref 8.6–10.2)
CHLORIDE SERPL-SCNC: 99 MMOL/L (ref 96–106)
CHOLEST SERPL-MCNC: 138 MG/DL (ref 100–199)
CO2 SERPL-SCNC: 18 MMOL/L (ref 20–29)
CREAT SERPL-MCNC: 1.48 MG/DL (ref 0.76–1.27)
EGFRCR SERPLBLD CKD-EPI 2021: 49 ML/MIN/1.73
EOSINOPHIL # BLD AUTO: 0.1 X10E3/UL (ref 0–0.4)
EOSINOPHIL NFR BLD AUTO: 2 %
ERYTHROCYTE [DISTWIDTH] IN BLOOD BY AUTOMATED COUNT: 13.7 % (ref 11.6–15.4)
GLOBULIN SER CALC-MCNC: 2.4 G/DL (ref 1.5–4.5)
GLUCOSE SERPL-MCNC: 104 MG/DL (ref 70–99)
HBA1C MFR BLD: 5.9 % (ref 4.8–5.6)
HCT VFR BLD AUTO: 33.4 % (ref 37.5–51)
HDLC SERPL-MCNC: 30 MG/DL
HGB BLD-MCNC: 10.9 G/DL (ref 13–17.7)
IMM GRANULOCYTES # BLD AUTO: 0 X10E3/UL (ref 0–0.1)
IMM GRANULOCYTES NFR BLD AUTO: 0 %
LDLC SERPL CALC-MCNC: 72 MG/DL (ref 0–99)
LYMPHOCYTES # BLD AUTO: 1.6 X10E3/UL (ref 0.7–3.1)
LYMPHOCYTES NFR BLD AUTO: 46 %
MCH RBC QN AUTO: 31.2 PG (ref 26.6–33)
MCHC RBC AUTO-ENTMCNC: 32.6 G/DL (ref 31.5–35.7)
MCV RBC AUTO: 96 FL (ref 79–97)
MONOCYTES # BLD AUTO: 0.6 X10E3/UL (ref 0.1–0.9)
MONOCYTES NFR BLD AUTO: 17 %
MORPHOLOGY BLD-IMP: ABNORMAL
NEUTROPHILS # BLD AUTO: 1.1 X10E3/UL (ref 1.4–7)
NEUTROPHILS NFR BLD AUTO: 33 %
PLATELET # BLD AUTO: 134 X10E3/UL (ref 150–450)
POTASSIUM SERPL-SCNC: 4 MMOL/L (ref 3.5–5.2)
PROT SERPL-MCNC: 6.2 G/DL (ref 6–8.5)
QT INTERVAL: 476 MS
QTC INTERVAL: 487 MS
RBC # BLD AUTO: 3.49 X10E6/UL (ref 4.14–5.8)
SODIUM SERPL-SCNC: 134 MMOL/L (ref 134–144)
TRIGL SERPL-MCNC: 213 MG/DL (ref 0–149)
TSH SERPL DL<=0.005 MIU/L-ACNC: 4.22 UIU/ML (ref 0.45–4.5)
VIT B12 SERPL-MCNC: 851 PG/ML (ref 232–1245)
VLDLC SERPL CALC-MCNC: 36 MG/DL (ref 5–40)
WBC # BLD AUTO: 3.4 X10E3/UL (ref 3.4–10.8)

## 2025-06-21 LAB
BACTERIA SPEC AEROBE CULT: NORMAL
BACTERIA SPEC AEROBE CULT: NORMAL

## 2025-06-24 NOTE — ASSESSMENT & PLAN NOTE
GFR is 49.Patient is instructed to not take any NSAIDs.  Medicines as directed.  Stay well-hydrated.

## 2025-06-24 NOTE — PROGRESS NOTES
Patient Name: Kavon Waters Jr.  : 1950   MRN: [id number]     Chief Complaint:    Chief Complaint   Patient presents with    Medication questions     Hypertension    Hypothyroidism    Hyperlipidemia       History of Present Illness: Kavon Waters Jr. is a 74 y.o. male who is here today for follow up on insomnia, neck pain, kidney function, and cholesterol.  HPI        Review of Systems:   Review of Systems   Constitutional: Negative.    HENT: Negative.     Eyes: Negative.    Respiratory: Negative.     Cardiovascular: Negative.    Gastrointestinal: Negative.    Neurological: Negative.         Past Medical History:   Past Medical History:   Diagnosis Date    Acquired hypothyroidism     Allergies     Arrhythmia     Arthritis     Atrial fibrillation     AV block, 1st degree     Borderline diabetes     BPH associated with nocturia     Chronic idiopathic constipation     CKD (chronic kidney disease), stage III     Constipation     Coronary artery disease involving native coronary artery of native heart without angina pectoris     Depression     Disease of thyroid gland     Eczema     Essential hypertension     Full dentures     GERD (gastroesophageal reflux disease)     H/O heart artery stent     X 7    High risk medication use     Ischemic cardiomyopathy     Long term (current) use of anticoagulants     Low sodium levels     Lower back pain     Mixed hyperlipidemia     PAUL (obstructive sleep apnea)     NO CPAP USE    Paroxysmal A-fib     Primary osteoarthritis involving multiple joints     Prostate cancer     HAD 43 RADIATION TREATMENTS.    Prostatic hypertrophy     Benign    RBBB     Recurrent major depressive disorder, in full remission     Seasonal allergies     Skin cancer     Stroke     AFFECTED RIGHT ARM/LEG.  PT DENIES RESIDUAL DEFICITS.    Thyroid disease     Wears glasses        Past Surgical History:   Past Surgical History:   Procedure Laterality Date    CARDIAC CATHETERIZATION      CARDIAC  CATHETERIZATION N/A 2018    Procedure: Left Heart Cath;  Surgeon: Moshe Ahn MD;  Location:  [facility];  Service: Cardiovascular    CHOLECYSTECTOMY      CIRCUMCISION  2005    COLONOSCOPY  2013    CORONARY ARTERY BYPASS GRAFT N/A 2018    Procedure: MEDIASTERNOTOMY CORONARY ARTERY BYPASS GRAFT X2 UTILIZING LEFT INTERNAL MAMMARY ARTERY, EVH LEFT GREATER SAPHENOUS VEIN;  Surgeon: Rakesh Mittal MD;  Location:  [facility];  Service: Cardiothoracic    CORONARY STENT PLACEMENT      SKIN CANCER EXCISION         Family History:   Family History   Problem Relation Age of Onset    Kidney failure Mother         COMPLICATIONS OF DIALYSIS       Social History:   Social History     Socioeconomic History    Marital status:      Spouse name: Mara    Number of children: 2    Highest education level: 9th grade   Tobacco Use    Smoking status: Former     Current packs/day: 0.00     Average packs/day: 1 pack/day for 23.3 years (23.3 ttl pk-yrs)     Types: Cigarettes     Start date: 1965     Quit date: 1988     Years since quittin.2     Passive exposure: Past    Smokeless tobacco: Never    Tobacco comments:     QUIT 30 YEARS AGO   Vaping Use    Vaping status: Never Used   Substance and Sexual Activity    Alcohol use: No    Drug use: No    Sexual activity: Not Currently       Medications:     Current Outpatient Medications:     alfuzosin (UROXATRAL) 10 MG 24 hr tablet, Take 1 tablet by mouth Daily., Disp: , Rfl:     amLODIPine (NORVASC) 5 MG tablet, TAKE 1 TABLET BY MOUTH EVERY DAY, Disp: 90 tablet, Rfl: 1    aspirin 81 MG chewable tablet, Chew 1 tablet Daily., Disp: , Rfl:     bicalutamide (CASODEX) 50 MG chemo tablet, Take 1 tablet by mouth Daily., Disp: , Rfl:     cetirizine (zyrTEC) 10 MG tablet, TAKE 1 TABLET BY MOUTH EVERY DAY, Disp: 90 tablet, Rfl: 1    clopidogrel (PLAVIX) 75 MG tablet, TAKE 1 TABLET BY MOUTH EVERY DAY, Disp: 90 tablet, Rfl: 1    ezetimibe  (ZETIA) 10 MG tablet, Take 1 tablet by mouth Daily., Disp: 90 tablet, Rfl: 1    fluticasone (CUTIVATE) 0.05 % cream, Apply 1 Application topically to the appropriate area as directed Daily., Disp: 30 g, Rfl: 0    fluticasone (FLONASE) 50 MCG/ACT nasal spray, USE 2 SPRAYS IN EACH NOSTRIL AS DIRECTED DAILY, Disp: 48 g, Rfl: 1    ketoconazole (NIZORAL) 2 % shampoo, APPLY TOPICALLY TO THE AFFECTED AREA 1 TIME EVERY WEEK AS DIRECTED, Disp: 120 mL, Rfl: 0    levothyroxine (SYNTHROID, LEVOTHROID) 50 MCG tablet, TAKE 1 TABLET BY MOUTH DAILY, Disp: 90 tablet, Rfl: 1    liothyronine (CYTOMEL) 5 MCG tablet, TAKE 1 TABLET BY MOUTH TWICE DAILY, Disp: 180 tablet, Rfl: 1    losartan (COZAAR) 100 MG tablet, Take 1 tablet by mouth Daily., Disp: 90 tablet, Rfl: 1    lubiprostone (AMITIZA) 24 MCG capsule, Take 1 capsule by mouth 2 (Two) Times a Day With Meals., Disp: , Rfl:     omeprazole (priLOSEC) 40 MG capsule, TAKE 1 CAPSULE BY MOUTH TWICE DAILY BEFORE MEALS, Disp: 180 capsule, Rfl: 0    Repatha SureClick solution auto-injector SureClick injection, ADMINISTER 1 ML UNDER THE SKIN EVERY 14 DAYS AS DIRECTED, Disp: 2 mL, Rfl: 0    sodium chloride 1 g tablet, Take 1 tablet by mouth 3 (Three) Times a Day., Disp: 30 tablet, Rfl: 0    traMADol (ULTRAM) 50 MG tablet, Take 1 tablet by mouth Every 8 (Eight) Hours As Needed for Moderate Pain., Disp: 90 tablet, Rfl: 2    triamcinolone (KENALOG) 0.1 % ointment, Apply 1 Application topically to the appropriate area as directed 2 (Two) Times a Day., Disp: , Rfl:     zolpidem CR (AMBIEN CR) 12.5 MG CR tablet, Take 1 tablet by mouth At Night As Needed for Sleep., Disp: 30 tablet, Rfl: 2    cyclobenzaprine (FLEXERIL) 10 MG tablet, Take 1 tablet by mouth 3 (Three) Times a Day As Needed for Muscle Spasms., Disp: 30 tablet, Rfl: 2    Allergies:   Allergies   Allergen Reactions    Ibuprofen Other (See Comments)     Instructed not to take due to his kidneys         Physical Exam:  Vital Signs:   Vitals:  "   06/25/25 1350   BP: 132/84   BP Location: Left arm   Patient Position: Sitting   Cuff Size: Adult   Pulse: 63   SpO2: 97%   Weight: 94.1 kg (207 lb 8 oz)   Height: 182.9 cm (72.01\")   PainSc: 0-No pain     Body mass index is 28.14 kg/m².     Physical Exam  Vitals and nursing note reviewed.   Constitutional:       Appearance: Normal appearance. He is normal weight.   HENT:      Head: Normocephalic and atraumatic.      Right Ear: Tympanic membrane, ear canal and external ear normal.      Left Ear: Tympanic membrane, ear canal and external ear normal.      Nose: Nose normal.      Mouth/Throat:      Mouth: Mucous membranes are dry.      Pharynx: Oropharynx is clear.   Eyes:      Extraocular Movements: Extraocular movements intact.      Conjunctiva/sclera: Conjunctivae normal.      Pupils: Pupils are equal, round, and reactive to light.   Cardiovascular:      Rate and Rhythm: Normal rate and regular rhythm.      Pulses: Normal pulses.      Heart sounds: Normal heart sounds.   Pulmonary:      Effort: Pulmonary effort is normal.      Breath sounds: Normal breath sounds.   Musculoskeletal:      Cervical back: Normal range of motion and neck supple.   Feet:      Comments:      Neurological:      Mental Status: He is alert.         Procedures      Assessment/Plan:   Diagnoses and all orders for this visit:    1. Primary insomnia (Primary)  Assessment & Plan:  Refill Ambien.  House bill 1.    Orders:  -     zolpidem CR (AMBIEN CR) 12.5 MG CR tablet; Take 1 tablet by mouth At Night As Needed for Sleep.  Dispense: 30 tablet; Refill: 2    2. Cervicalgia  Assessment & Plan:  We creased patient tramadol to 3 times a day at last visit.      3. Chronic kidney disease, stage 3a  Assessment & Plan:  GFR is 49.Patient is instructed to not take any NSAIDs.  Medicines as directed.  Stay well-hydrated.        4. Acquired hypothyroidism  Assessment & Plan:  TSH is 4.220.  Recheck in 6 months.      5. Hyperglycemia  Assessment & Plan:  A1 " 1C is 5.9.  Recheck in 6 months.      6. Hyperlipidemia, mixed  Assessment & Plan:  HDL 30.  LDL 72.  Triglycerides 213.  Recheck in 6 months.      7. Essential hypertension  Assessment & Plan:  Discussed with patient to monitor their blood pressure and if systolic blood pressure goes above 140 or diastolic is above 90 to return to clinic.  Take medicines as directed, call for any problems, patient not having or any worrisome symptoms.          8. Coronary artery disease involving native coronary artery of native heart without angina pectoris  Assessment & Plan:  Aggressive risk factor modification      Other orders  -     cyclobenzaprine (FLEXERIL) 10 MG tablet; Take 1 tablet by mouth 3 (Three) Times a Day As Needed for Muscle Spasms.  Dispense: 30 tablet; Refill: 2             Follow Up:   No follow-ups on file.      Mina Story MD  [facility]

## 2025-06-25 ENCOUNTER — OFFICE VISIT (OUTPATIENT)
Dept: FAMILY MEDICINE CLINIC | Facility: CLINIC | Age: 75
End: 2025-06-25
Payer: MEDICARE

## 2025-06-25 VITALS
SYSTOLIC BLOOD PRESSURE: 132 MMHG | HEART RATE: 63 BPM | HEIGHT: 72 IN | BODY MASS INDEX: 28.1 KG/M2 | DIASTOLIC BLOOD PRESSURE: 84 MMHG | WEIGHT: 207.5 LBS | OXYGEN SATURATION: 97 %

## 2025-06-25 DIAGNOSIS — E03.9 ACQUIRED HYPOTHYROIDISM: ICD-10-CM

## 2025-06-25 DIAGNOSIS — I25.10 CORONARY ARTERY DISEASE INVOLVING NATIVE CORONARY ARTERY OF NATIVE HEART WITHOUT ANGINA PECTORIS: ICD-10-CM

## 2025-06-25 DIAGNOSIS — R73.9 HYPERGLYCEMIA: ICD-10-CM

## 2025-06-25 DIAGNOSIS — M54.2 CERVICALGIA: ICD-10-CM

## 2025-06-25 DIAGNOSIS — F51.01 PRIMARY INSOMNIA: Primary | ICD-10-CM

## 2025-06-25 DIAGNOSIS — E78.2 HYPERLIPIDEMIA, MIXED: ICD-10-CM

## 2025-06-25 DIAGNOSIS — I10 ESSENTIAL HYPERTENSION: ICD-10-CM

## 2025-06-25 DIAGNOSIS — N18.31 CHRONIC KIDNEY DISEASE, STAGE 3A: ICD-10-CM

## 2025-06-25 RX ORDER — CYCLOBENZAPRINE HCL 10 MG
10 TABLET ORAL 3 TIMES DAILY PRN
Qty: 30 TABLET | Refills: 2 | Status: SHIPPED | OUTPATIENT
Start: 2025-06-25

## 2025-06-25 RX ORDER — ZOLPIDEM TARTRATE 12.5 MG/1
12.5 TABLET, FILM COATED, EXTENDED RELEASE ORAL NIGHTLY PRN
Qty: 30 TABLET | Refills: 2 | Status: SHIPPED | OUTPATIENT
Start: 2025-06-25

## 2025-06-26 RX ORDER — LUBIPROSTONE 24 UG/1
24 CAPSULE ORAL EVERY 12 HOURS SCHEDULED
Qty: 180 CAPSULE | Refills: 0 | Status: SHIPPED | OUTPATIENT
Start: 2025-06-26

## 2025-06-26 NOTE — TELEPHONE ENCOUNTER
Rx Refill Note    Requested Prescriptions     Pending Prescriptions Disp Refills    lubiprostone (AMITIZA) 24 MCG capsule [Pharmacy Med Name: LUBIPROSTONE 24MCG CAPSULES] 180 capsule 0     Sig: TAKE ONE CAPSULE BY MOUTH TWICE DAILY        Last office visit with prescribing clinician: 6/25/2025      Next office visit with prescribing clinician: 9/25/2025   Last labs:   Last refill: n/a   Pharmacy (be sure to add in Epic). correct

## 2025-06-30 ENCOUNTER — TELEPHONE (OUTPATIENT)
Dept: FAMILY MEDICINE CLINIC | Facility: CLINIC | Age: 75
End: 2025-06-30
Payer: MEDICARE

## 2025-06-30 NOTE — TELEPHONE ENCOUNTER
I called and talked to patient.  He is not having any chest pain shortness of breath.  He will bring his blood pressure cuff by tomorrow to check his blood pressure

## 2025-06-30 NOTE — TELEPHONE ENCOUNTER
HE SAID HIS BLOOD PRESSURE IS RUNNING HIGH - WHEN HE ETS SALT IT GOES HIGH -  THIS MORNING HE ATE HEARD W/ SALT AND HIS BP /82    PLEASE CALL HIM

## 2025-07-01 ENCOUNTER — OFFICE VISIT (OUTPATIENT)
Dept: FAMILY MEDICINE CLINIC | Facility: CLINIC | Age: 75
End: 2025-07-01
Payer: MEDICARE

## 2025-07-01 VITALS
OXYGEN SATURATION: 98 % | BODY MASS INDEX: 28.07 KG/M2 | SYSTOLIC BLOOD PRESSURE: 142 MMHG | HEART RATE: 64 BPM | DIASTOLIC BLOOD PRESSURE: 88 MMHG | WEIGHT: 207 LBS

## 2025-07-01 DIAGNOSIS — N18.31 CHRONIC KIDNEY DISEASE, STAGE 3A: ICD-10-CM

## 2025-07-01 DIAGNOSIS — I10 ESSENTIAL HYPERTENSION: Primary | ICD-10-CM

## 2025-07-01 DIAGNOSIS — R73.9 HYPERGLYCEMIA: ICD-10-CM

## 2025-07-01 DIAGNOSIS — E78.2 HYPERLIPIDEMIA, MIXED: ICD-10-CM

## 2025-07-01 RX ORDER — AMLODIPINE BESYLATE 10 MG/1
10 TABLET ORAL DAILY
Qty: 90 TABLET | Refills: 1 | Status: SHIPPED | OUTPATIENT
Start: 2025-07-01

## 2025-07-01 NOTE — ASSESSMENT & PLAN NOTE
Discussed with patient to monitor their blood pressure and if systolic blood pressure goes above 140 or diastolic is above 90 to return to clinic.  Take medicines as directed, call for any problems, patient not having or any worrisome symptoms.    Blood pressure came down in the office but it was still elevated.  He states he feels just a little dizzy and this is how he feels and his blood pressure is elevated.  I am going to add his second amlodipine to his regimen so he is taking 10 mg total come back in 1 week for recheck.  I told him if he gets worse he is good emergency room.

## 2025-07-01 NOTE — PROGRESS NOTES
Patient Name: Kavon Waters Jr.  : 1950   MRN: [id number]     Chief Complaint:    Chief Complaint   Patient presents with    Hypertension       History of Present Illness: Kavon Waters Jr. is a 74 y.o. male who is here today for follow up. For NA, BP,BG, andmeia  HPI        Review of Systems:   Review of Systems   Constitutional: Negative.    HENT: Negative.     Eyes: Negative.    Respiratory: Negative.     Cardiovascular: Negative.    Gastrointestinal: Negative.    Neurological:  Positive for dizziness.        Past Medical History:   Past Medical History:   Diagnosis Date    Acquired hypothyroidism     Allergies     Arrhythmia     Arthritis     Atrial fibrillation     AV block, 1st degree     Borderline diabetes     BPH associated with nocturia     Chronic idiopathic constipation     CKD (chronic kidney disease), stage III     Constipation     Coronary artery disease involving native coronary artery of native heart without angina pectoris     Depression     Disease of thyroid gland     Eczema     Essential hypertension     Full dentures     GERD (gastroesophageal reflux disease)     H/O heart artery stent     X 7    High risk medication use     Ischemic cardiomyopathy     Long term (current) use of anticoagulants     Low sodium levels     Lower back pain     Mixed hyperlipidemia     PAUL (obstructive sleep apnea)     NO CPAP USE    Paroxysmal A-fib     Primary osteoarthritis involving multiple joints     Prostate cancer     HAD 43 RADIATION TREATMENTS.    Prostatic hypertrophy     Benign    RBBB     Recurrent major depressive disorder, in full remission     Seasonal allergies     Skin cancer     Stroke     AFFECTED RIGHT ARM/LEG.  PT DENIES RESIDUAL DEFICITS.    Thyroid disease     Wears glasses        Past Surgical History:   Past Surgical History:   Procedure Laterality Date    CARDIAC CATHETERIZATION      CARDIAC CATHETERIZATION N/A 2018    Procedure: Left Heart Cath;  Surgeon: Moshe Cuevas  MD Jimy;  Location:  [facility];  Service: Cardiovascular    CHOLECYSTECTOMY      CIRCUMCISION  2005    COLONOSCOPY  2013    CORONARY ARTERY BYPASS GRAFT N/A 2018    Procedure: MEDIASTERNOTOMY CORONARY ARTERY BYPASS GRAFT X2 UTILIZING LEFT INTERNAL MAMMARY ARTERY, EVH LEFT GREATER SAPHENOUS VEIN;  Surgeon: Rakesh Mittal MD;  Location:  [facility];  Service: Cardiothoracic    CORONARY STENT PLACEMENT      SKIN CANCER EXCISION         Family History:   Family History   Problem Relation Age of Onset    Kidney failure Mother         COMPLICATIONS OF DIALYSIS       Social History:   Social History     Socioeconomic History    Marital status:      Spouse name: Mara    Number of children: 2    Highest education level: 9th grade   Tobacco Use    Smoking status: Former     Current packs/day: 0.00     Average packs/day: 1 pack/day for 23.3 years (23.3 ttl pk-yrs)     Types: Cigarettes     Start date: 1965     Quit date: 1988     Years since quittin.2     Passive exposure: Past    Smokeless tobacco: Never    Tobacco comments:     QUIT 30 YEARS AGO   Vaping Use    Vaping status: Never Used   Substance and Sexual Activity    Alcohol use: No    Drug use: No    Sexual activity: Not Currently       Medications:     Current Outpatient Medications:     alfuzosin (UROXATRAL) 10 MG 24 hr tablet, Take 1 tablet by mouth Daily., Disp: , Rfl:     aspirin 81 MG chewable tablet, Chew 1 tablet Daily., Disp: , Rfl:     bicalutamide (CASODEX) 50 MG chemo tablet, Take 1 tablet by mouth Daily., Disp: , Rfl:     cetirizine (zyrTEC) 10 MG tablet, TAKE 1 TABLET BY MOUTH EVERY DAY, Disp: 90 tablet, Rfl: 1    clopidogrel (PLAVIX) 75 MG tablet, TAKE 1 TABLET BY MOUTH EVERY DAY, Disp: 90 tablet, Rfl: 1    cyclobenzaprine (FLEXERIL) 10 MG tablet, Take 1 tablet by mouth 3 (Three) Times a Day As Needed for Muscle Spasms., Disp: 30 tablet, Rfl: 2    ezetimibe (ZETIA) 10 MG tablet, Take 1 tablet by  mouth Daily., Disp: 90 tablet, Rfl: 1    fluticasone (CUTIVATE) 0.05 % cream, Apply 1 Application topically to the appropriate area as directed Daily., Disp: 30 g, Rfl: 0    fluticasone (FLONASE) 50 MCG/ACT nasal spray, USE 2 SPRAYS IN EACH NOSTRIL AS DIRECTED DAILY, Disp: 48 g, Rfl: 1    ketoconazole (NIZORAL) 2 % shampoo, APPLY TOPICALLY TO THE AFFECTED AREA 1 TIME EVERY WEEK AS DIRECTED, Disp: 120 mL, Rfl: 0    levothyroxine (SYNTHROID, LEVOTHROID) 50 MCG tablet, TAKE 1 TABLET BY MOUTH DAILY, Disp: 90 tablet, Rfl: 1    liothyronine (CYTOMEL) 5 MCG tablet, TAKE 1 TABLET BY MOUTH TWICE DAILY, Disp: 180 tablet, Rfl: 1    losartan (COZAAR) 100 MG tablet, Take 1 tablet by mouth Daily., Disp: 90 tablet, Rfl: 1    lubiprostone (AMITIZA) 24 MCG capsule, TAKE ONE CAPSULE BY MOUTH TWICE DAILY, Disp: 180 capsule, Rfl: 0    omeprazole (priLOSEC) 40 MG capsule, TAKE 1 CAPSULE BY MOUTH TWICE DAILY BEFORE MEALS, Disp: 180 capsule, Rfl: 0    Repatha SureClick solution auto-injector SureClick injection, ADMINISTER 1 ML UNDER THE SKIN EVERY 14 DAYS AS DIRECTED, Disp: 2 mL, Rfl: 0    traMADol (ULTRAM) 50 MG tablet, Take 1 tablet by mouth Every 8 (Eight) Hours As Needed for Moderate Pain., Disp: 90 tablet, Rfl: 2    triamcinolone (KENALOG) 0.1 % ointment, Apply 1 Application topically to the appropriate area as directed 2 (Two) Times a Day., Disp: , Rfl:     zolpidem CR (AMBIEN CR) 12.5 MG CR tablet, Take 1 tablet by mouth At Night As Needed for Sleep., Disp: 30 tablet, Rfl: 2    amLODIPine (NORVASC) 10 MG tablet, Take 1 tablet by mouth Daily., Disp: 90 tablet, Rfl: 1    Allergies:   Allergies   Allergen Reactions    Ibuprofen Other (See Comments)     Instructed not to take due to his kidneys         Physical Exam:  Vital Signs:   Vitals:    07/01/25 1116 07/01/25 1126   BP: 178/100 142/88   BP Location: Left arm    Patient Position: Sitting    Cuff Size: Adult    Pulse: 64    SpO2: 98%    Weight: 93.9 kg (207 lb)      Body mass  index is 28.07 kg/m².     Physical Exam  Vitals and nursing note reviewed.   Constitutional:       Appearance: Normal appearance. He is normal weight.   HENT:      Head: Normocephalic and atraumatic.      Right Ear: Tympanic membrane, ear canal and external ear normal.      Left Ear: Tympanic membrane, ear canal and external ear normal.      Nose: Nose normal.      Mouth/Throat:      Mouth: Mucous membranes are dry.      Pharynx: Oropharynx is clear.   Eyes:      Extraocular Movements: Extraocular movements intact.      Conjunctiva/sclera: Conjunctivae normal.      Pupils: Pupils are equal, round, and reactive to light.   Cardiovascular:      Rate and Rhythm: Normal rate and regular rhythm.      Pulses: Normal pulses.      Heart sounds: Normal heart sounds.   Pulmonary:      Effort: Pulmonary effort is normal.      Breath sounds: Normal breath sounds.   Musculoskeletal:      Cervical back: Normal range of motion and neck supple.   Feet:      Comments:      Neurological:      Mental Status: He is alert.         Procedures      Assessment/Plan:   Diagnoses and all orders for this visit:    1. Essential hypertension (Primary)  Assessment & Plan:  Discussed with patient to monitor their blood pressure and if systolic blood pressure goes above 140 or diastolic is above 90 to return to clinic.  Take medicines as directed, call for any problems, patient not having or any worrisome symptoms.    Blood pressure came down in the office but it was still elevated.  He states he feels just a little dizzy and this is how he feels and his blood pressure is elevated.  I am going to add his second amlodipine to his regimen so he is taking 10 mg total come back in 1 week for recheck.  I told him if he gets worse he is good emergency room.      2. Hyperlipidemia, mixed  Assessment & Plan:  HDL 30.  LDL 72.  Triglycerides 213.  Recheck in 6 months.      3. Hyperglycemia  Assessment & Plan:  A1 1C is 5.9.  Recheck in 6 months.      4.  Chronic kidney disease, stage 3a  Assessment & Plan:  GFR is 49.Patient is instructed to not take any NSAIDs.  Medicines as directed.  Stay well-hydrated.        Other orders  -     amLODIPine (NORVASC) 10 MG tablet; Take 1 tablet by mouth Daily.  Dispense: 90 tablet; Refill: 1             Follow Up:   No follow-ups on file.      Mina Story MD  [facility]

## 2025-07-05 DIAGNOSIS — E03.9 HYPOTHYROIDISM, UNSPECIFIED TYPE: ICD-10-CM

## 2025-07-07 PROBLEM — R42 DIZZINESS: Status: ACTIVE | Noted: 2025-07-07

## 2025-07-07 RX ORDER — LOSARTAN POTASSIUM 100 MG/1
100 TABLET ORAL DAILY
Qty: 90 TABLET | Refills: 1 | Status: SHIPPED | OUTPATIENT
Start: 2025-07-07

## 2025-07-07 RX ORDER — LIOTHYRONINE SODIUM 5 UG/1
5 TABLET ORAL EVERY 12 HOURS SCHEDULED
Qty: 180 TABLET | Refills: 1 | Status: SHIPPED | OUTPATIENT
Start: 2025-07-07

## 2025-07-07 NOTE — PROGRESS NOTES
Patient Name: Kavon Waters Jr.  : 1950   MRN: [id number]     Chief Complaint:    Chief Complaint   Patient presents with    Hypertension    sore neck       History of Present Illness: Kavon Waters Jr. is a 74 y.o. male who is here today for follow up on blood pressure.  And dizziness.  HPI        Review of Systems:   Review of Systems   Constitutional: Negative.    HENT: Negative.     Eyes: Negative.    Respiratory: Negative.     Cardiovascular: Negative.    Gastrointestinal: Negative.    Neurological: Negative.         Past Medical History:   Past Medical History:   Diagnosis Date    Acquired hypothyroidism     Allergies     Arrhythmia     Arthritis     Atrial fibrillation     AV block, 1st degree     Borderline diabetes     BPH associated with nocturia     Chronic idiopathic constipation     CKD (chronic kidney disease), stage III     Constipation     Coronary artery disease involving native coronary artery of native heart without angina pectoris     Depression     Disease of thyroid gland     Eczema     Essential hypertension     Full dentures     GERD (gastroesophageal reflux disease)     H/O heart artery stent     X 7    High risk medication use     Ischemic cardiomyopathy     Long term (current) use of anticoagulants     Low sodium levels     Lower back pain     Mixed hyperlipidemia     PAUL (obstructive sleep apnea)     NO CPAP USE    Paroxysmal A-fib     Primary osteoarthritis involving multiple joints     Prostate cancer     HAD 43 RADIATION TREATMENTS.    Prostatic hypertrophy     Benign    RBBB     Recurrent major depressive disorder, in full remission     Seasonal allergies     Skin cancer     Stroke     AFFECTED RIGHT ARM/LEG.  PT DENIES RESIDUAL DEFICITS.    Thyroid disease     Wears glasses        Past Surgical History:   Past Surgical History:   Procedure Laterality Date    CARDIAC CATHETERIZATION      CARDIAC CATHETERIZATION N/A 2018    Procedure: Left Heart Cath;  Surgeon:  Moshe Ahn MD;  Location:  [facility];  Service: Cardiovascular    CHOLECYSTECTOMY      CIRCUMCISION  2005    COLONOSCOPY  2013    CORONARY ARTERY BYPASS GRAFT N/A 2018    Procedure: MEDIASTERNOTOMY CORONARY ARTERY BYPASS GRAFT X2 UTILIZING LEFT INTERNAL MAMMARY ARTERY, EVH LEFT GREATER SAPHENOUS VEIN;  Surgeon: Rakesh Mittal MD;  Location:  [facility];  Service: Cardiothoracic    CORONARY STENT PLACEMENT      SKIN CANCER EXCISION         Family History:   Family History   Problem Relation Age of Onset    Kidney failure Mother         COMPLICATIONS OF DIALYSIS       Social History:   Social History     Socioeconomic History    Marital status:      Spouse name: Mara    Number of children: 2    Highest education level: 9th grade   Tobacco Use    Smoking status: Former     Current packs/day: 0.00     Average packs/day: 1 pack/day for 23.3 years (23.3 ttl pk-yrs)     Types: Cigarettes     Start date: 1965     Quit date: 1988     Years since quittin.2     Passive exposure: Past    Smokeless tobacco: Never    Tobacco comments:     QUIT 30 YEARS AGO   Vaping Use    Vaping status: Never Used   Substance and Sexual Activity    Alcohol use: No    Drug use: No    Sexual activity: Not Currently       Medications:     Current Outpatient Medications:     alfuzosin (UROXATRAL) 10 MG 24 hr tablet, Take 1 tablet by mouth Daily., Disp: , Rfl:     amLODIPine (NORVASC) 10 MG tablet, Take 1 tablet by mouth Daily., Disp: 90 tablet, Rfl: 1    aspirin 81 MG chewable tablet, Chew 1 tablet Daily., Disp: , Rfl:     bicalutamide (CASODEX) 50 MG chemo tablet, Take 1 tablet by mouth Daily., Disp: , Rfl:     cetirizine (zyrTEC) 10 MG tablet, TAKE 1 TABLET BY MOUTH EVERY DAY, Disp: 90 tablet, Rfl: 1    clopidogrel (PLAVIX) 75 MG tablet, TAKE 1 TABLET BY MOUTH EVERY DAY, Disp: 90 tablet, Rfl: 1    cyclobenzaprine (FLEXERIL) 10 MG tablet, Take 1 tablet by mouth 3 (Three) Times a Day As  Needed for Muscle Spasms., Disp: 30 tablet, Rfl: 2    Diclofenac Sodium (VOLTAREN) 1 % gel gel, Apply 4 g topically to the appropriate area as directed 4 (Four) Times a Day As Needed (neck pain)., Disp: 120 g, Rfl: 1    ezetimibe (ZETIA) 10 MG tablet, Take 1 tablet by mouth Daily., Disp: 90 tablet, Rfl: 1    fluticasone (CUTIVATE) 0.05 % cream, Apply 1 Application topically to the appropriate area as directed Daily., Disp: 30 g, Rfl: 0    fluticasone (FLONASE) 50 MCG/ACT nasal spray, USE 2 SPRAYS IN EACH NOSTRIL AS DIRECTED DAILY, Disp: 48 g, Rfl: 1    ketoconazole (NIZORAL) 2 % shampoo, APPLY TOPICALLY TO THE AFFECTED AREA 1 TIME EVERY WEEK AS DIRECTED, Disp: 120 mL, Rfl: 0    levothyroxine (SYNTHROID, LEVOTHROID) 50 MCG tablet, TAKE 1 TABLET BY MOUTH DAILY, Disp: 90 tablet, Rfl: 1    liothyronine (CYTOMEL) 5 MCG tablet, TAKE 1 TABLET BY MOUTH TWICE DAILY, Disp: 180 tablet, Rfl: 1    losartan (COZAAR) 100 MG tablet, TAKE 1 TABLET BY MOUTH DAILY, Disp: 90 tablet, Rfl: 1    lubiprostone (AMITIZA) 24 MCG capsule, TAKE ONE CAPSULE BY MOUTH TWICE DAILY, Disp: 180 capsule, Rfl: 0    omeprazole (priLOSEC) 40 MG capsule, TAKE 1 CAPSULE BY MOUTH TWICE DAILY BEFORE MEALS, Disp: 180 capsule, Rfl: 0    Repatha SureClick solution auto-injector SureClick injection, ADMINISTER 1 ML UNDER THE SKIN EVERY 14 DAYS AS DIRECTED, Disp: 2 mL, Rfl: 0    traMADol (ULTRAM) 50 MG tablet, Take 1 tablet by mouth Every 8 (Eight) Hours As Needed for Moderate Pain., Disp: 90 tablet, Rfl: 2    triamcinolone (KENALOG) 0.1 % ointment, Apply 1 Application topically to the appropriate area as directed 2 (Two) Times a Day., Disp: , Rfl:     zolpidem CR (AMBIEN CR) 12.5 MG CR tablet, Take 1 tablet by mouth At Night As Needed for Sleep., Disp: 30 tablet, Rfl: 2    Allergies:   Allergies   Allergen Reactions    Ibuprofen Other (See Comments)     Instructed not to take due to his kidneys         Physical Exam:  Vital Signs:   Vitals:    07/08/25 1238  "07/08/25 1249   BP: 148/79 136/78   BP Location: Left arm    Patient Position: Sitting    Cuff Size: Adult    Pulse: 59    Resp: 16    SpO2: 98%    Weight: 93.9 kg (207 lb)    Height: 182.9 cm (72.01\")    PainSc: 0-No pain      Body mass index is 28.07 kg/m².     Physical Exam  Vitals and nursing note reviewed.   Constitutional:       Appearance: Normal appearance. He is normal weight.   HENT:      Head: Normocephalic and atraumatic.      Right Ear: Tympanic membrane, ear canal and external ear normal.      Left Ear: Tympanic membrane, ear canal and external ear normal.      Nose: Nose normal.      Mouth/Throat:      Mouth: Mucous membranes are dry.      Pharynx: Oropharynx is clear.   Eyes:      Extraocular Movements: Extraocular movements intact.      Conjunctiva/sclera: Conjunctivae normal.      Pupils: Pupils are equal, round, and reactive to light.   Cardiovascular:      Rate and Rhythm: Normal rate and regular rhythm.      Pulses: Normal pulses.      Heart sounds: Normal heart sounds.   Pulmonary:      Effort: Pulmonary effort is normal.      Breath sounds: Normal breath sounds.   Musculoskeletal:      Cervical back: Normal range of motion and neck supple.   Feet:      Comments:      Neurological:      Mental Status: He is alert.         Procedures      Assessment/Plan:   Diagnoses and all orders for this visit:    1. Essential hypertension (Primary)  Assessment & Plan:  We had doubled the patient's amlodipine to 10 mg a day at last visit.  Pressure has been running good at home.  When he first came in his blood pressure was up a little bit.  After a while we rechecked it and it came down.      2. Dizziness  Assessment & Plan:  Patient has had this whenever his blood pressure was high.      3. Coronary artery disease involving native coronary artery of native heart with angina pectoris  Assessment & Plan:  Aggressive risk factor modification      4. Cervicalgia  Assessment & Plan:  We will try some diclofenac " gel.      Other orders  -     Diclofenac Sodium (VOLTAREN) 1 % gel gel; Apply 4 g topically to the appropriate area as directed 4 (Four) Times a Day As Needed (neck pain).  Dispense: 120 g; Refill: 1             Follow Up:   No follow-ups on file.      Mina Story MD  [facility]

## 2025-07-07 NOTE — ASSESSMENT & PLAN NOTE
We had doubled the patient's amlodipine to 10 mg a day at last visit.  Pressure has been running good at home.  When he first came in his blood pressure was up a little bit.  After a while we rechecked it and it came down.

## 2025-07-08 ENCOUNTER — OFFICE VISIT (OUTPATIENT)
Dept: FAMILY MEDICINE CLINIC | Facility: CLINIC | Age: 75
End: 2025-07-08
Payer: MEDICARE

## 2025-07-08 VITALS
SYSTOLIC BLOOD PRESSURE: 136 MMHG | OXYGEN SATURATION: 98 % | HEIGHT: 72 IN | DIASTOLIC BLOOD PRESSURE: 78 MMHG | HEART RATE: 59 BPM | BODY MASS INDEX: 28.04 KG/M2 | RESPIRATION RATE: 16 BRPM | WEIGHT: 207 LBS

## 2025-07-08 DIAGNOSIS — I25.119 CORONARY ARTERY DISEASE INVOLVING NATIVE CORONARY ARTERY OF NATIVE HEART WITH ANGINA PECTORIS: ICD-10-CM

## 2025-07-08 DIAGNOSIS — I10 ESSENTIAL HYPERTENSION: Primary | ICD-10-CM

## 2025-07-08 DIAGNOSIS — R42 DIZZINESS: ICD-10-CM

## 2025-07-08 DIAGNOSIS — M54.2 CERVICALGIA: ICD-10-CM

## 2025-07-08 PROCEDURE — 3075F SYST BP GE 130 - 139MM HG: CPT | Performed by: FAMILY MEDICINE

## 2025-07-08 PROCEDURE — 3078F DIAST BP <80 MM HG: CPT | Performed by: FAMILY MEDICINE

## 2025-07-08 PROCEDURE — G2211 COMPLEX E/M VISIT ADD ON: HCPCS | Performed by: FAMILY MEDICINE

## 2025-07-08 PROCEDURE — 1126F AMNT PAIN NOTED NONE PRSNT: CPT | Performed by: FAMILY MEDICINE

## 2025-07-08 PROCEDURE — 99214 OFFICE O/P EST MOD 30 MIN: CPT | Performed by: FAMILY MEDICINE

## 2025-07-11 RX ORDER — EVOLOCUMAB 140 MG/ML
INJECTION, SOLUTION SUBCUTANEOUS
Qty: 2 ML | Refills: 0 | Status: SHIPPED | OUTPATIENT
Start: 2025-07-11

## 2025-07-30 DIAGNOSIS — K21.9 GASTROESOPHAGEAL REFLUX DISEASE WITHOUT ESOPHAGITIS: ICD-10-CM

## 2025-07-30 RX ORDER — OMEPRAZOLE 40 MG/1
40 CAPSULE, DELAYED RELEASE ORAL
Qty: 180 CAPSULE | Refills: 0 | Status: SHIPPED | OUTPATIENT
Start: 2025-07-30

## 2025-07-30 NOTE — TELEPHONE ENCOUNTER
Rx Refill Note    Requested Prescriptions     Pending Prescriptions Disp Refills    omeprazole (priLOSEC) 40 MG capsule [Pharmacy Med Name: OMEPRAZOLE 40MG CAPSULES] 180 capsule 0     Sig: TAKE 1 CAPSULE BY MOUTH TWICE DAILY BEFORE MEALS        Last office visit with prescribing clinician: 7/8/2025      Next office visit with prescribing clinician: 9/25/2025   Last labs:   Last refill: 05/06/2025   Pharmacy (be sure to add in Epic). correct

## 2025-08-05 DIAGNOSIS — I73.9 PAD (PERIPHERAL ARTERY DISEASE): ICD-10-CM

## 2025-08-06 RX ORDER — EVOLOCUMAB 140 MG/ML
INJECTION, SOLUTION SUBCUTANEOUS
Qty: 2 ML | Refills: 0 | Status: SHIPPED | OUTPATIENT
Start: 2025-08-06

## 2025-08-06 RX ORDER — CLOPIDOGREL BISULFATE 75 MG/1
75 TABLET ORAL DAILY
Qty: 90 TABLET | Refills: 1 | Status: SHIPPED | OUTPATIENT
Start: 2025-08-06

## (undated) DEVICE — 12 FOOT DISPOSABLE EXTENSION CABLE WITH SAFE CONNECT / SCREW-DOWN

## (undated) DEVICE — AIRWY SZ11

## (undated) DEVICE — BNDG ELAS MATRX V/CLS 4IN 5YD LF

## (undated) DEVICE — TOWEL,OR,DSP,ST,BLUE,STD,8/PK,10PK/CS: Brand: MEDLINE

## (undated) DEVICE — Device: Brand: MEDEX

## (undated) DEVICE — PK SPECIALTYCARE M/STATE 1 OH 1/2V CUST

## (undated) DEVICE — SEALANT HEMO TACHOSIL FIBRIN PTCH 9.5X4.8CM

## (undated) DEVICE — COVADERM PLUS: Brand: DEROYAL

## (undated) DEVICE — RESUSCITATOR,MANUAL,ADLT,MASK,TUBE RES: Brand: MEDLINE INDUSTRIES, INC.

## (undated) DEVICE — PK HEART OPN 10

## (undated) DEVICE — CATH DIAG EXPO .045 FL3  5F 100CM

## (undated) DEVICE — 3M™ TEGADERM™ CHG DRESSING 25/CARTON 4 CARTONS/CASE 1658: Brand: TEGADERM™

## (undated) DEVICE — Device

## (undated) DEVICE — MEDI-VAC YANKAUER SUCTION HANDLE W/BULBOUS TIP: Brand: CARDINAL HEALTH

## (undated) DEVICE — DEV COMP RAD PRELUDESYNC 29CM

## (undated) DEVICE — MEDI-VAC NON-CONDUCTIVE SUCTION TUBING: Brand: CARDINAL HEALTH

## (undated) DEVICE — GW INQWIRE FC PTFE STD J/1.5 .035 260

## (undated) DEVICE — ST INF 10DRP 4 PORT 4WY/STPCOCK 112IN

## (undated) DEVICE — SUT PDS 1 CTX 36IN VIO PDP371T

## (undated) DEVICE — DRSNG SURESITE WNDW 4X4.5

## (undated) DEVICE — 3M™ RANGER™ BLOOD/FLUID WARMING STANDARD FLOW SET, 24200, 10/CASE: Brand: 3M™ RANGER™

## (undated) DEVICE — PRESSURE MONITORING SET: Brand: TRUWAVE

## (undated) DEVICE — SOL NS 500ML

## (undated) DEVICE — VASOVIEW HEMOPRO: Brand: VASOVIEW HEMOPRO

## (undated) DEVICE — PRESSURE MONITORING ACCESSORY: Brand: TRUWAVE

## (undated) DEVICE — SAFETY SCALPEL: Brand: DEROYAL

## (undated) DEVICE — CONNECTOR PH 6-IN-1 Y ST: Brand: CARDINAL HEALTH

## (undated) DEVICE — ST BLOOD ADMIN YTP 80IN

## (undated) DEVICE — ST PRIM GRVTY NDLESS 3 INJ PORT 105IN

## (undated) DEVICE — SUCTION CANISTER, 2500CC, RIGID: Brand: DEROYAL

## (undated) DEVICE — SKIN AFFIX SURG ADHESIVE 72/CS 0.55ML: Brand: MEDLINE

## (undated) DEVICE — PRESSURE MONITORING SET: Brand: TRUWAVE, VAMP

## (undated) DEVICE — SUT SILK 0/0 CT2 18IN C027D

## (undated) DEVICE — INTRO SHEATH PRELUDE IDEAL SPRNG COIL 021 6F 23X80CM

## (undated) DEVICE — OASIS DRAIN, SINGLE, INLINE & ATS COMPATIBLE: Brand: OASIS

## (undated) DEVICE — SUT PROLN 6/0 C1 D/A 30IN 8706H

## (undated) DEVICE — SUT PROLN 4/0 SH D/A 36IN 8521H

## (undated) DEVICE — CLTH CLENS READYCLEANSE PERI CARE PK/5

## (undated) DEVICE — 2963 MEDIPORE SOFT CLOTH TAPE 3 IN X 10 YD 12 RLS/CS: Brand: 3M™ MEDIPORE™

## (undated) DEVICE — TUBING, SUCTION, 1/4" X 10', STRAIGHT: Brand: MEDLINE

## (undated) DEVICE — SUT PROLN 7/0 CV BV1 24IN 8304H BX/36

## (undated) DEVICE — SUT SILK 2 SUTUPAK TIE 60IN SA8H 2STRAND

## (undated) DEVICE — CATH DIAG EXPO M/ PK 5F FL4/FR4 PIG

## (undated) DEVICE — SUT PROLN 4/0 RB1 D/A 36IN 8557H

## (undated) DEVICE — TEMP PACING WIRE: Brand: MYO/WIRE

## (undated) DEVICE — BNDG ELAS ELITE V/CLOSE 6IN 5YD LF STRL

## (undated) DEVICE — SENSR CERBRL O2 SOMASENSOR ADHS A/ LF

## (undated) DEVICE — TRAP,MUCUS SPECIMEN,40CC: Brand: MEDLINE

## (undated) DEVICE — SOL NACL 0.9PCT 1000ML

## (undated) DEVICE — AMD ANTIMICROBIAL GAUZE SPONGES,12 PLY USP TYPE VII, 0.2% POLYHEXAMETHYLENE BIGUANIDE HCI (PHMB): Brand: CURITY

## (undated) DEVICE — SUT SILK 4/0 TIES 18IN A183H

## (undated) DEVICE — SUT PROLN 3/0 SH D/A 36IN 8522H

## (undated) DEVICE — INTRAOPERATIVE COVER KIT, 10 PACK: Brand: SITE-RITE

## (undated) DEVICE — AVANTI + 4F STD W/GW: Brand: AVANTI

## (undated) DEVICE — DUAL LUMEN STOMACH TUBE,ANTI-REFLUX VALVE: Brand: SALEM SUMP

## (undated) DEVICE — NDL PERC 1PRT THNWALL W/BASEPLT 18G 7CM

## (undated) DEVICE — ANTIBACTERIAL UNDYED BRAIDED (POLYGLACTIN 910), SYNTHETIC ABSORBABLE SUTURE: Brand: COATED VICRYL